# Patient Record
Sex: FEMALE | Race: WHITE | NOT HISPANIC OR LATINO
[De-identification: names, ages, dates, MRNs, and addresses within clinical notes are randomized per-mention and may not be internally consistent; named-entity substitution may affect disease eponyms.]

---

## 2018-08-21 ENCOUNTER — APPOINTMENT (OUTPATIENT)
Dept: FAMILY MEDICINE | Facility: CLINIC | Age: 28
End: 2018-08-21
Payer: COMMERCIAL

## 2018-08-21 VITALS
OXYGEN SATURATION: 100 % | HEIGHT: 63 IN | BODY MASS INDEX: 19.49 KG/M2 | RESPIRATION RATE: 12 BRPM | WEIGHT: 110 LBS | SYSTOLIC BLOOD PRESSURE: 115 MMHG | HEART RATE: 67 BPM | DIASTOLIC BLOOD PRESSURE: 77 MMHG

## 2018-08-21 DIAGNOSIS — Z78.9 OTHER SPECIFIED HEALTH STATUS: ICD-10-CM

## 2018-08-21 DIAGNOSIS — Z80.9 FAMILY HISTORY OF MALIGNANT NEOPLASM, UNSPECIFIED: ICD-10-CM

## 2018-08-21 PROCEDURE — 99385 PREV VISIT NEW AGE 18-39: CPT

## 2018-08-21 NOTE — REVIEW OF SYSTEMS
[Abdominal Pain] : abdominal pain [Negative] : Neurological [Nausea] : no nausea [Constipation] : no constipation [Diarrhea] : diarrhea [Vomiting] : no vomiting

## 2018-08-21 NOTE — PLAN
[FreeTextEntry1] : 1. Abdominal pain: given pt's HPI, likely IBS but cannot exclude other malabsorptive/gastric etiology. Will refer to a gastroenterologist for further workout. Discussed keeping a food journal of the meals that cause more pain prior to GI visit. \par 2. Dyspareunia: Pt reports never seeing a OB/GYN, will refer her for further work up and pap smear. Denies STI testing at this visit.\par 3. Lipoma: 1.5cm on L shoulder; discussed removal of mass is solely cosmetic but gave pt a dermatologist to see in case she wishes to pursue removal. \par 3. HCM: will request vaccine titers in order to complete employment physical exam form.

## 2018-08-21 NOTE — PHYSICAL EXAM

## 2018-08-21 NOTE — HISTORY OF PRESENT ILLNESS
[FreeTextEntry1] : 28y F presenting to [facility] care and c/o diffuse abdominal pain for years. Describes pain as crampy 8/10 when severe, 5/10 on average. Pain worsens after a meal, usually relieved with BM. States after every meal has a BM of  mixed normal/soft consistency, on average 1-2 BM/day. Reports BM can have her on the toilet for up to an hour.  States will eat less at work to avoid using the bathroom and if has the urge to defecate, will wait until she returns home in the evening. Describes stool as nonmalodorous,without floating, normal brown color. Denies family hx of malabsorption. Denies fever, n/v, weight loss, straining, hematochezia, melena, diarrhea, constipation.

## 2018-08-21 NOTE — PAST MEDICAL HISTORY
[Menstruating] : menstruating [Menarche Age ____] : age at menarche was [unfilled] [Definite ___ (Date)] : the last menstrual period was [unfilled]

## 2018-08-23 ENCOUNTER — LABORATORY RESULT (OUTPATIENT)
Age: 28
End: 2018-08-23

## 2018-08-28 LAB
HBV SURFACE AB SERPL IA-ACNC: 14.8 MIU/ML
MEV IGG FLD QL IA: <5 AU/ML
MEV IGG+IGM SER-IMP: NEGATIVE
MUV AB SER-ACNC: POSITIVE
MUV IGG SER QL IA: 17.6 AU/ML
RUBV IGG FLD-ACNC: 1.1 INDEX
RUBV IGG SER-IMP: POSITIVE
VZV AB TITR SER: POSITIVE
VZV IGG SER IF-ACNC: 1773 INDEX

## 2018-09-06 LAB
M TB IFN-G BLD-IMP: POSITIVE
QUANTIFERON TB PLUS MITOGEN MINUS NIL: >10 IU/ML
QUANTIFERON TB PLUS NIL: 0.01 IU/ML
QUANTIFERON TB PLUS TB1 MINUS NIL: 1.19 IU/ML
QUANTIFERON TB PLUS TB2 MINUS NIL: 1.26 IU/ML

## 2018-09-13 ENCOUNTER — APPOINTMENT (OUTPATIENT)
Dept: OBGYN | Facility: CLINIC | Age: 28
End: 2018-09-13

## 2018-09-19 ENCOUNTER — LABORATORY RESULT (OUTPATIENT)
Age: 28
End: 2018-09-19

## 2018-10-08 ENCOUNTER — TRANSCRIPTION ENCOUNTER (OUTPATIENT)
Age: 28
End: 2018-10-08

## 2018-11-12 ENCOUNTER — APPOINTMENT (OUTPATIENT)
Dept: FAMILY MEDICINE | Facility: CLINIC | Age: 28
End: 2018-11-12

## 2021-01-06 ENCOUNTER — TRANSCRIPTION ENCOUNTER (OUTPATIENT)
Age: 31
End: 2021-01-06

## 2021-01-26 ENCOUNTER — TRANSCRIPTION ENCOUNTER (OUTPATIENT)
Age: 31
End: 2021-01-26

## 2022-08-25 ENCOUNTER — EMERGENCY (EMERGENCY)
Facility: HOSPITAL | Age: 32
LOS: 1 days | Discharge: OTHER INSTITUTION | End: 2022-08-25
Payer: COMMERCIAL

## 2022-08-25 ENCOUNTER — INPATIENT (INPATIENT)
Facility: HOSPITAL | Age: 32
LOS: 4 days | Discharge: HOME | DRG: 287 | End: 2022-08-30
Attending: INTERNAL MEDICINE | Admitting: OBSTETRICS & GYNECOLOGY
Payer: COMMERCIAL

## 2022-08-25 VITALS
SYSTOLIC BLOOD PRESSURE: 119 MMHG | WEIGHT: 106.04 LBS | DIASTOLIC BLOOD PRESSURE: 76 MMHG | OXYGEN SATURATION: 100 % | RESPIRATION RATE: 18 BRPM | HEIGHT: 63 IN | TEMPERATURE: 98 F | HEART RATE: 100 BPM

## 2022-08-25 VITALS
TEMPERATURE: 100 F | OXYGEN SATURATION: 83 % | DIASTOLIC BLOOD PRESSURE: 64 MMHG | SYSTOLIC BLOOD PRESSURE: 118 MMHG | HEART RATE: 91 BPM

## 2022-08-25 VITALS
SYSTOLIC BLOOD PRESSURE: 117 MMHG | DIASTOLIC BLOOD PRESSURE: 87 MMHG | RESPIRATION RATE: 20 BRPM | HEART RATE: 100 BPM | OXYGEN SATURATION: 100 %

## 2022-08-25 DIAGNOSIS — O26.899 OTHER SPECIFIED PREGNANCY RELATED CONDITIONS, UNSPECIFIED TRIMESTER: ICD-10-CM

## 2022-08-25 DIAGNOSIS — Z3A.00 WEEKS OF GESTATION OF PREGNANCY NOT SPECIFIED: ICD-10-CM

## 2022-08-25 LAB
ALBUMIN SERPL ELPH-MCNC: 3.6 G/DL — SIGNIFICANT CHANGE UP (ref 3.3–5)
ALP SERPL-CCNC: 36 U/L — LOW (ref 40–120)
ALT FLD-CCNC: 16 U/L — SIGNIFICANT CHANGE UP (ref 10–45)
ANION GAP SERPL CALC-SCNC: 10 MMOL/L — SIGNIFICANT CHANGE UP (ref 5–17)
APPEARANCE UR: ABNORMAL
APTT BLD: 25.8 SEC — LOW (ref 27.5–35.5)
AST SERPL-CCNC: 21 U/L — SIGNIFICANT CHANGE UP (ref 10–40)
BACTERIA # UR AUTO: ABNORMAL
BASOPHILS # BLD AUTO: 0.02 K/UL — SIGNIFICANT CHANGE UP (ref 0–0.2)
BASOPHILS NFR BLD AUTO: 0.2 % — SIGNIFICANT CHANGE UP (ref 0–2)
BILIRUB SERPL-MCNC: 0.8 MG/DL — SIGNIFICANT CHANGE UP (ref 0.2–1.2)
BILIRUB UR-MCNC: NEGATIVE — SIGNIFICANT CHANGE UP
BLD GP AB SCN SERPL QL: NEGATIVE — SIGNIFICANT CHANGE UP
BUN SERPL-MCNC: 10 MG/DL — SIGNIFICANT CHANGE UP (ref 7–23)
CALCIUM SERPL-MCNC: 8.5 MG/DL — SIGNIFICANT CHANGE UP (ref 8.4–10.5)
CHLORIDE SERPL-SCNC: 104 MMOL/L — SIGNIFICANT CHANGE UP (ref 96–108)
CO2 SERPL-SCNC: 23 MMOL/L — SIGNIFICANT CHANGE UP (ref 22–31)
COLOR SPEC: SIGNIFICANT CHANGE UP
CREAT SERPL-MCNC: 0.56 MG/DL — SIGNIFICANT CHANGE UP (ref 0.5–1.3)
DIFF PNL FLD: ABNORMAL
EGFR: 126 ML/MIN/1.73M2 — SIGNIFICANT CHANGE UP
EOSINOPHIL # BLD AUTO: 0.03 K/UL — SIGNIFICANT CHANGE UP (ref 0–0.5)
EOSINOPHIL NFR BLD AUTO: 0.3 % — SIGNIFICANT CHANGE UP (ref 0–6)
EPI CELLS # UR: 4 /HPF — SIGNIFICANT CHANGE UP
FIBRINOGEN PPP-MCNC: 558 MG/DL — HIGH (ref 330–520)
GLUCOSE SERPL-MCNC: 89 MG/DL — SIGNIFICANT CHANGE UP (ref 70–99)
GLUCOSE UR QL: NEGATIVE — SIGNIFICANT CHANGE UP
HCT VFR BLD CALC: 33.4 % — LOW (ref 34.5–45)
HGB BLD-MCNC: 11.4 G/DL — LOW (ref 11.5–15.5)
IMM GRANULOCYTES NFR BLD AUTO: 0.4 % — SIGNIFICANT CHANGE UP (ref 0–1.5)
INR BLD: 0.92 RATIO — SIGNIFICANT CHANGE UP (ref 0.88–1.16)
KETONES UR-MCNC: SIGNIFICANT CHANGE UP
LEUKOCYTE ESTERASE UR-ACNC: ABNORMAL
LYMPHOCYTES # BLD AUTO: 0.95 K/UL — LOW (ref 1–3.3)
LYMPHOCYTES # BLD AUTO: 10 % — LOW (ref 13–44)
MCHC RBC-ENTMCNC: 30.8 PG — SIGNIFICANT CHANGE UP (ref 27–34)
MCHC RBC-ENTMCNC: 34.1 GM/DL — SIGNIFICANT CHANGE UP (ref 32–36)
MCV RBC AUTO: 90.3 FL — SIGNIFICANT CHANGE UP (ref 80–100)
MONOCYTES # BLD AUTO: 0.72 K/UL — SIGNIFICANT CHANGE UP (ref 0–0.9)
MONOCYTES NFR BLD AUTO: 7.6 % — SIGNIFICANT CHANGE UP (ref 2–14)
NEUTROPHILS # BLD AUTO: 7.77 K/UL — HIGH (ref 1.8–7.4)
NEUTROPHILS NFR BLD AUTO: 81.5 % — HIGH (ref 43–77)
NITRITE UR-MCNC: NEGATIVE — SIGNIFICANT CHANGE UP
NRBC # BLD: 0 /100 WBCS — SIGNIFICANT CHANGE UP (ref 0–0)
PH UR: 6.5 — SIGNIFICANT CHANGE UP (ref 5–8)
PLATELET # BLD AUTO: 364 K/UL — SIGNIFICANT CHANGE UP (ref 150–400)
POTASSIUM SERPL-MCNC: 3.8 MMOL/L — SIGNIFICANT CHANGE UP (ref 3.5–5.3)
POTASSIUM SERPL-SCNC: 3.8 MMOL/L — SIGNIFICANT CHANGE UP (ref 3.5–5.3)
PROT SERPL-MCNC: 6.1 G/DL — SIGNIFICANT CHANGE UP (ref 6–8.3)
PROT UR-MCNC: SIGNIFICANT CHANGE UP
PROTHROM AB SERPL-ACNC: 10.7 SEC — SIGNIFICANT CHANGE UP (ref 10.5–13.4)
RBC # BLD: 3.7 M/UL — LOW (ref 3.8–5.2)
RBC # FLD: 13.4 % — SIGNIFICANT CHANGE UP (ref 10.3–14.5)
RBC CASTS # UR COMP ASSIST: SIGNIFICANT CHANGE UP /HPF (ref 0–4)
RH IG SCN BLD-IMP: NEGATIVE — SIGNIFICANT CHANGE UP
SARS-COV-2 RNA SPEC QL NAA+PROBE: SIGNIFICANT CHANGE UP
SODIUM SERPL-SCNC: 137 MMOL/L — SIGNIFICANT CHANGE UP (ref 135–145)
SP GR SPEC: 1.01 — SIGNIFICANT CHANGE UP (ref 1.01–1.02)
TROPONIN T, HIGH SENSITIVITY RESULT: 7 NG/L — SIGNIFICANT CHANGE UP (ref 0–51)
UROBILINOGEN FLD QL: NEGATIVE — SIGNIFICANT CHANGE UP
WBC # BLD: 9.53 K/UL — SIGNIFICANT CHANGE UP (ref 3.8–10.5)
WBC # FLD AUTO: 9.53 K/UL — SIGNIFICANT CHANGE UP (ref 3.8–10.5)
WBC UR QL: 7 /HPF — HIGH (ref 0–5)

## 2022-08-25 PROCEDURE — 86900 BLOOD TYPING SEROLOGIC ABO: CPT

## 2022-08-25 PROCEDURE — 99285 EMERGENCY DEPT VISIT HI MDM: CPT | Mod: 25

## 2022-08-25 PROCEDURE — 81001 URINALYSIS AUTO W/SCOPE: CPT

## 2022-08-25 PROCEDURE — 84443 ASSAY THYROID STIM HORMONE: CPT

## 2022-08-25 PROCEDURE — 85610 PROTHROMBIN TIME: CPT

## 2022-08-25 PROCEDURE — 83880 ASSAY OF NATRIURETIC PEPTIDE: CPT

## 2022-08-25 PROCEDURE — 86901 BLOOD TYPING SEROLOGIC RH(D): CPT

## 2022-08-25 PROCEDURE — U0003: CPT

## 2022-08-25 PROCEDURE — 87086 URINE CULTURE/COLONY COUNT: CPT

## 2022-08-25 PROCEDURE — 99284 EMERGENCY DEPT VISIT MOD MDM: CPT | Mod: 25

## 2022-08-25 PROCEDURE — 85379 FIBRIN DEGRADATION QUANT: CPT

## 2022-08-25 PROCEDURE — 36415 COLL VENOUS BLD VENIPUNCTURE: CPT

## 2022-08-25 PROCEDURE — U0005: CPT

## 2022-08-25 PROCEDURE — 84484 ASSAY OF TROPONIN QUANT: CPT

## 2022-08-25 PROCEDURE — 84436 ASSAY OF TOTAL THYROXINE: CPT

## 2022-08-25 PROCEDURE — 86850 RBC ANTIBODY SCREEN: CPT

## 2022-08-25 PROCEDURE — 99223 1ST HOSP IP/OBS HIGH 75: CPT

## 2022-08-25 PROCEDURE — 80053 COMPREHEN METABOLIC PANEL: CPT

## 2022-08-25 PROCEDURE — 85025 COMPLETE CBC W/AUTO DIFF WBC: CPT

## 2022-08-25 PROCEDURE — 85730 THROMBOPLASTIN TIME PARTIAL: CPT

## 2022-08-25 PROCEDURE — 93010 ELECTROCARDIOGRAM REPORT: CPT

## 2022-08-25 RX ORDER — ACETAMINOPHEN 500 MG
650 TABLET ORAL ONCE
Refills: 0 | Status: COMPLETED | OUTPATIENT
Start: 2022-08-25 | End: 2022-08-25

## 2022-08-25 RX ORDER — SODIUM CHLORIDE 9 MG/ML
1000 INJECTION, SOLUTION INTRAVENOUS
Refills: 0 | Status: COMPLETED | OUTPATIENT
Start: 2022-08-25 | End: 2022-08-26

## 2022-08-25 RX ORDER — SODIUM CHLORIDE 9 MG/ML
1000 INJECTION INTRAMUSCULAR; INTRAVENOUS; SUBCUTANEOUS ONCE
Refills: 0 | Status: COMPLETED | OUTPATIENT
Start: 2022-08-25 | End: 2022-08-25

## 2022-08-25 RX ORDER — MORPHINE SULFATE 50 MG/1
4 CAPSULE, EXTENDED RELEASE ORAL ONCE
Refills: 0 | Status: COMPLETED | OUTPATIENT
Start: 2022-08-25 | End: 2022-08-25

## 2022-08-25 RX ADMIN — Medication 650 MILLIGRAM(S): at 17:39

## 2022-08-25 RX ADMIN — SODIUM CHLORIDE 1000 MILLILITER(S): 9 INJECTION INTRAMUSCULAR; INTRAVENOUS; SUBCUTANEOUS at 17:38

## 2022-08-25 RX ADMIN — SODIUM CHLORIDE 1000 MILLILITER(S): 9 INJECTION INTRAMUSCULAR; INTRAVENOUS; SUBCUTANEOUS at 19:26

## 2022-08-25 RX ADMIN — Medication 650 MILLIGRAM(S): at 19:57

## 2022-08-25 NOTE — ED PROVIDER NOTE - CLINICAL SUMMARY MEDICAL DECISION MAKING FREE TEXT BOX
Impression:  H&P most c/w vasovagal syncope, given antecedent light-headedness, diaphoresis, and then had an LOC.  Fall down the stars is notable for lack of significant external trauma at this time.    By CCHR/CCCR, the patient does not necessitate CT at this time.  Bedside US shows +fetal movement, and FHR ~ 130s  Plan:  Basic labs, NST, TSH, T4, UA, ECG, reassess. IVF. ECG:  LBBB no comparison study  Impression:  Syncope in context of new LBBB on ECG; will need admit for cardiac workup and likely TTE.  By CCHR/CCCR, the patient does not necessitate CT at this time.  Bedside US shows +fetal movement; FHRs 130s; patient was told that her OB told her that the baby needed a specific "cardiac echo."   Plan:  Basic labs, NST, TSH, T4, UA, ECG, reassess. IVF.  Anticipate admit, either to OB with Cards consult, or Medicine with Cards and OB consult.

## 2022-08-25 NOTE — ED PROVIDER NOTE - PHYSICAL EXAMINATION
Gen: NAD, AOx3, able to make needs known, non-toxic  HEENT: EOMI, oral mucosa moist, normal conjunctiva. No head trauma visualized, no cervical spine tenderness.   CV: pulses bilaterally, no sternal tenderness, no clavicle tenderness   Abd: gravid, tender in the bilateral lower regions, fundus 4.5cm above the umbilicus   MSK: no visible bony deformities, no spinal tenderness, no tenderness with palpation of the bilateral UE and LE, no hip or pelvis tenderness.  Neuro: No focal sensory or motor deficits   Skin: Warm, well perfused, no rash   Psych: normal affect

## 2022-08-25 NOTE — CONSULT NOTE ADULT - ASSESSMENT
The patient is a 32 year old , currently at 20 weeks of gestation presenting to the ED after a reported syncopal episode. EKG found to have LBBB. Recommendations at this time are as follows:       Syncope/Pre-syncope  - Event likely vasovagal in nature/secondary to dehydration given prodrome   - Telemetry monitoring   - orthostatic vitals if possible  - Adequate hydration      LBBB   - EKG with LBBB, unclear whether this is new as patient reports no prior EKGs for comparison   - QTc prolonged, avoid further QT prolonging agents   - Chest pain not concerning for angina, patient describes the pain as a burning associated with reflux, most significant after meals, not associated with activity/walking etc.   - Initial Troponin 12, repeat pending   -Echo in AM to evaluate for any ventricular dysfunction       Lilliana Stahl MD   Cardiology Fellow     This consult note is preliminary until cosigned by the attending cardiologist. The patient is a 32 year old , currently at 20 weeks of gestation presenting to the ED after a reported syncopal episode. EKG found to have LBBB. 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   - Initial Troponin 12, repeat pending   -Echo in AM to evaluate for any ventricular dysfunction       Lilliana Sthal MD   Cardiology Fellow     This consult note is preliminary until cosigned by the attending cardiologist. The patient is a 32 year old , currently at 20 weeks of gestation presenting to the ED after a reported syncopal episode. EKG found to have LBBB. Chest pain mild at this time and patient describes this as associated with reflux and similar to her GERD discomfort. 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   - Initial Troponin 12, repeat pending   - Echo in AM to evaluate for any ventricular dysfunction       Lilliana Stahl MD   Cardiology Fellow     This consult note is preliminary until cosigned by the attending cardiologist.

## 2022-08-25 NOTE — OB RN TRIAGE NOTE - PRO MENTAL HEALTH SX RECENT
none
SISI SANDERS  Cardiology  [address] [place] [address].  [place] [address]  Phone: [phone]  Fax: [phone]  Follow Up Time:

## 2022-08-25 NOTE — ED PROVIDER NOTE - NS ED ROS FT
GENERAL: No fever, no chills  EYES: No change in vision  HEENT: No trouble swallowing or speaking  CARDIAC: No chest pain  PULMONARY: No cough, no SOB  GI: +abdominal pain, no nausea, no vomiting, no diarrhea, no constipation  : No changes in urination  SKIN: No rashes  NEURO: No headache, no numbness  MSK: No joint pain  Otherwise as HPI or negative.

## 2022-08-25 NOTE — H&P ADULT - HISTORY OF PRESENT ILLNESS
TAMMIE CRANE  32y  Female [id number]    INCOMPLETE    HPI: 32 year old  @ 20+1 weeks (NERISSA: 23) presents to the ED s/p a fall at 3pm. Patient states that at this time she was standing on the top of the stairs when she suddenly began to feel lower abdominal pain and dizzy. Because she thought she was about to synopsize, the patient sat down. Per the patient, the next thing she remembers is waking up at the bottom of the stairs sitting on her bottom. Patient thinks that she slid down the stairs on her bottom; however, she is unsure, as she was not conscious when she went down the stairs. Patient states that since the fall she has been feeling constant, lower back pain that she rates as an 8/10. She received Tylenol in the ED for the pain; however, states that it only marginally helped. Patient states that the back pain radiates to her lower abdomen. States that the pain in her lower abdomen comes and goes and she rates this pain as a 5/10. Of note, the syncopal episode was unwitnessed; however, she denies any urinary or bowel incontinence. Denies any tongue biting. Also states that when she regained consciousness she did not feel any increase in tiredness.         Name of GYN Physician: Sarah    POB:      Pgyn: Denies fibroids, cysts, endometriosis, STI's, Abnormal pap smears     Home meds:     Hospital Meds:   MEDICATIONS  (STANDING):  morphine  - Injectable 4 milliGRAM(s) IV Push Once    MEDICATIONS  (PRN):      Allergies    No Known Allergies    Intolerances        PAST MEDICAL & SURGICAL HISTORY:  No pertinent past medical history      No significant past surgical history          FAMILY HISTORY:      Social History:  Denies smoking use, drug use, alcohol use.   +occasional social alcohol use    Vital Signs Last 24 Hrs  T(C): 36.8 (25 Aug 2022 16:36), Max: 36.8 (25 Aug 2022 16:36)  T(F): 98.3 (25 Aug 2022 16:36), Max: 98.3 (25 Aug 2022 16:36)  HR: 100 (25 Aug 2022 18:30) (100 - 100)  BP: 117/87 (25 Aug 2022 18:30) (117/87 - 119/76)  BP(mean): --  RR: 20 (25 Aug 2022 18:30) (18 - 20)  SpO2: 100% (25 Aug 2022 18:30) (100% - 100%)    Parameters below as of 25 Aug 2022 18:30  Patient On (Oxygen Delivery Method): room air        Physical Exam:   General: sitting comfortably in bed, NAD   CV: RR S1S2 no m/r/g  Lungs: CTA b/l, good air flow b/l   Back: No CVA tenderness  Abd: Soft, non-tender, non-distended.  Bowel sounds present.    :  No bleeding on pad.    External labia wnl.  Bimanual exam with no cervical motion tenderness, uterus wnl, adnexa non palpable b/l.  Cervix closed vs. Cervix dilated    cm   Speculum Exam: No active bleeding from os.  Physiologic discharge.    Ext: non-tender b/l, no edema     LABS:                              11.4   9.53  )-----------( 364      ( 25 Aug 2022 18:02 )             33.4         137  |  104  |  10  ----------------------------<  89  3.8   |  23  |  0.56    Ca    8.5      25 Aug 2022 18:04    TPro  6.1  /  Alb  3.6  /  TBili  0.8  /  DBili  x   /  AST  21  /  ALT  16  /  AlkPhos  36<L>      I&O's Detail    PT/INR - ( 25 Aug 2022 18:03 )   PT: 10.7 sec;   INR: 0.92 ratio         PTT - ( 25 Aug 2022 18:03 )  PTT:25.8 sec      RADIOLOGY & ADDITIONAL STUDIES: TAMMIE CRANE  32y  Female [id number]    INCOMPLETE    HPI: 32 year old  @ 20+1 weeks (NERISSA: 23) presents to the ED s/p a fall at 3pm. Patient states that at this time she was standing on the top of the stairs when she suddenly began to feel lower abdominal pain and dizzy. Because she thought she was about to synopsize, the patient sat down. Per the patient, the next thing she remembers is waking up at the bottom of the stairs sitting on her bottom. Patient thinks that she slid down the stairs on her bottom; however, she is unsure, as she was not conscious when she went down the stairs. Patient states that since the fall she has been feeling constant, lower back pain that she rates as an 8/10. She received Tylenol in the ED for the pain; however, states that it only marginally helped. Patient states that the back pain radiates to her lower abdomen. States that the pain in her lower abdomen comes and goes and she rates this pain as a 5/10. Of note, the syncopal episode was unwitnessed; however, she denies any urinary or bowel incontinence. Denies any tongue biting. Also states that when she regained consciousness she did not feel any increase in tiredness. Of note patient states that she had these presyncopal feelings multiple times in the past year that have intensified during pregnancy. Of note patient denies ever losing consciousness in the past. Patient states that these feelings are normally precipitated by warm temperatures or standing for prolonged periods of time. Patient denies any family history of anyone suffering from episodes akin to this. At time of OBGYN evaluation in the ED, patient still endorsed lightheadedness and dizziness and stated that she did not think she would be able to get out of bed without a syncopal event. Patient endorsed intermittent palpitations. Denies nausea, vomiting, chest pain at time of OBGYN initial evaluation.      Name of GYN Physician: Sarah    POB: Primagravid  Gyn: Denies cysts, fibroids, abnormal paps, STIs  PMHx: Denies  Meds: None  All: NKDA  Surgeries: Denies  Social: Denies toxic habits x3         TAMMIE CRANE  32y  Female [id number]    INCOMPLETE    HPI: 32 year old  @ 20+1 weeks (NERISSA: 23) presents to the ED s/p a fall at 3pm. Patient states that at this time she was standing on the top of the stairs when she suddenly began to feel lower abdominal pain and dizzy. Because she thought she was about to synopsize, the patient sat down. Per the patient, the next thing she remembers is waking up at the bottom of the stairs sitting on her bottom. Patient thinks that she slid down the stairs on her bottom; however, she is unsure, as she was not conscious when she went down the stairs. Patient states that since the fall she has been feeling constant, lower back pain that she rates as an 8/10. She received Tylenol in the ED for the pain; however, states that it only marginally helped. Patient states that the back pain radiates to her lower abdomen. States that the pain in her lower abdomen comes and goes and she rates this pain as a 5/10. Of note, the syncopal episode was unwitnessed; however, she denies any urinary or bowel incontinence. Denies any tongue biting. Also states that when she regained consciousness she did not feel any increase in tiredness. Of note patient states that she had these presyncopal feelings multiple times in the past year that have intensified during pregnancy. Of note patient denies ever losing consciousness in the past. Patient states that these feelings are normally precipitated by warm temperatures or standing for prolonged periods of time. Patient denies any family history of anyone suffering from episodes akin to this. At time of OBGYN evaluation in the ED, patient still endorsed lightheadedness and dizziness and stated that she did not think she would be able to get out of bed without a syncopal event. Patient endorsed intermittent palpitations. Denies nausea, vomiting, chest pain at time of OBGYN initial evaluation.    PNC: Complicated by PACs seen on fetal growth scan, patient recommended to follow for fetal echo    Name of GYN Physician: Sarah    POB: Primagravid  Gyn: Denies cysts, fibroids, abnormal paps, STIs  PMHx: Denies  Meds: None  All: NKDA  Surgeries: Denies  Social: Denies toxic habits x3

## 2022-08-25 NOTE — OB RN TRIAGE NOTE - SUICIDE SCREENING QUESTION 2
No

## 2022-08-25 NOTE — ED PROVIDER NOTE - ATTENDING CONTRIBUTION TO CARE
MD Hawkins:  patient seen and evaluated with the resident.  I was present for key portions of the History and Physical, and I agree with the Impression and Plan.    Patient is a 31 yo F,  @ 5.5 months, bib EMS s/p fall down stairs at home.   Onset: felt light-headedness while at the top of stairs at home, sat down bc she didn't want to fall, next thing she knew, she woke up on the bottom of her stairs.  Denies CP/SOB.   Onset: gradually with antecedent light-headedness and tunnel-vision.    Quality:  full LOC.  Duration< 60 sec.  Associated Sx:  No CP/SOB, no palpitations, back pain.  No weakness/numbness, no abdominal pain, & no fever/chills.  +fetal movement, no bleeding, no loss of fluid.    VS: wnl.  Physical Exam: adult F, NAD, NCAT, PERRL, EOMI, neck supple, CTA B, RRR,   Abd: s/nd/nt, Ext: no edema.    Spine:  no midline TTP, no step-off.  Neuro:  AAOx3, moving all 4 extremities equally, normal gait.     ECG:    Impression:  H&P most c/w vasovagal syncope, given antecedent light-headedness, diaphoresis, and then had an LOC.  Fall down the stars is notable for lack of significant external trauma at this time.    By CCHR/CCCR, the patient does not necessitate CT at this time.  Bedside US shows +fetal movement; FHRs 130s; patient was told that her OB told her that the baby needed a specific "cardiac echo."   Plan:  Basic labs, NST, TSH, T4, UA, ECG, reassess. IVF. MD Hawkins:  patient seen and evaluated with the resident.  I was present for key portions of the History and Physical, and I agree with the Impression and Plan.    Patient is a 33 yo F,  @ 5.5 months, bib EMS s/p fall down stairs at home.   Onset: felt light-headedness while at the top of stairs at home, sat down bc she didn't want to fall, next thing she knew, she woke up on the bottom of her stairs.  Denies CP/SOB.   Onset: gradually with antecedent light-headedness and tunnel-vision.    Quality:  full LOC.  Duration< 60 sec.  Associated Sx:  No CP/SOB, no palpitations, back pain.  No weakness/numbness, no abdominal pain, & no fever/chills.  +fetal movement, no bleeding, no loss of fluid.    VS: wnl.  Physical Exam: adult F, NAD, NCAT, PERRL, EOMI, neck supple, CTA B, RRR,   Abd: s/nd/nt, Ext: no edema.    Spine:  no midline TTP, no step-off.  Neuro:  AAOx3, moving all 4 extremities equally, normal gait.     ECG:  LBBB no comparison study  Impression:  Syncope in context of new LBBB on ECG; will need admit for cardiac workup and likely TTE.  By CCHR/CCCR, the patient does not necessitate CT at this time.  Bedside US shows +fetal movement; FHRs 130s; patient was told that her OB told her that the baby needed a specific "cardiac echo."   Plan:  Basic labs, NST, TSH, T4, UA, ECG, reassess. IVF.  Anticipate admit, either to OB with Cards consult, or Medicine with Cards and OB consult.

## 2022-08-25 NOTE — ED ADULT NURSE NOTE - NSIMPLEMENTINTERV_GEN_ALL_ED
Implemented All Fall Risk Interventions:  [place] to call system. Call bell, personal items and telephone within reach. Instruct patient to call for assistance. Room bathroom lighting operational. Non-slip footwear when patient is off stretcher. Physically safe environment: no spills, clutter or unnecessary equipment. Stretcher in lowest position, wheels locked, appropriate side rails in place. Provide visual cue, wrist band, yellow gown, etc. Monitor gait and stability. Monitor for mental status changes and reorient to person, place, and time. Review medications for side effects contributing to fall risk. Reinforce activity limits and safety measures with patient and family.

## 2022-08-25 NOTE — ED PROVIDER NOTE - PROGRESS NOTE DETAILS
Javed, PGY2 - ObGyn aware, will come see patient. Cardiology was paged previously but ER told to call back Javed, PGY2 - Cardiology Favio consulted for new onset LBBB, will come see patient MD Hawkins:  ddimer < 500.  Per YEARS algorithm, patient does not need CTA at this time. Layo PGY2   Patient signed out to me pending OB rec.   Discussed with OB - they would like the patient to go back to L&D first to be evaluated and then they will admit the patient to their service.

## 2022-08-25 NOTE — ED PROVIDER NOTE - OBJECTIVE STATEMENT
31yo woman , 5 months and 1 week pregnant, with no PMH presenting after syncopal episode complaining of lower abdominal and back pain. Patient was at the top of carpeted stairs, felt lightheaded, sat down, next thing she knows she is at the bottom of the stairs. Denies vaginal bleeding or fluid. Initially had SOB but that has resolved at this time.

## 2022-08-25 NOTE — H&P ADULT - ATTENDING COMMENTS
P0 20w1d with left bundle branch block and possible syncopal episode.  Pt admitted for cardiology work up and monitoring. On tele per Cards. for TTE in AM.   Prior pain now resolved. no ctx on toco. okay to d/c toco.   BPP 8/8, MVP 3.8. nl FHR. nl appearing closed cervix.   Baby with PAC on 20 wk sono, plan for fetal echo. to find out if able to complete in house.   Very low suspicion for PTL or other acute OB pathology.   Holding on [facility] consult for now. Will await further cardiology recs.   Sue WILBURN

## 2022-08-25 NOTE — ED ADULT NURSE NOTE - OBJECTIVE STATEMENT
Pt is a 33 y/o female  s/p syncope causing fall. States she has had some dizziness over past few weeks, woke up today c/o SOB and dizziness which worsened when pt states she synopsized when attempting to go down stairs. States she does not remember and was unwitnessed but was at bottom of stairs when she gained consciousness. Pt c/o L shoulder pain and generalized lower back pain. Denies headache or abdominal pain. Denies N/V/D, numbness, tingling, cough, fever, chills, weakness, headache. A&Ox3. Breathing unlabored and spontaneous. Abdomen soft, nontender. Full ROM and strength of extremities. Safety and comfort measures provided, call bell provided to pt and bed in lowest position.

## 2022-08-25 NOTE — H&P ADULT - NSHPPHYSICALEXAM_GEN_ALL_CORE
Vital Signs Last 24 Hrs  T(C): 36.8 (25 Aug 2022 16:36), Max: 36.8 (25 Aug 2022 16:36)  T(F): 98.3 (25 Aug 2022 16:36), Max: 98.3 (25 Aug 2022 16:36)  HR: 100 (25 Aug 2022 18:30) (100 - 100)  BP: 117/87 (25 Aug 2022 18:30) (117/87 - 119/76)  BP(mean): --  RR: 20 (25 Aug 2022 18:30) (18 - 20)  SpO2: 100% (25 Aug 2022 18:30) (100% - 100%)    Parameters below as of 25 Aug 2022 18:30  Patient On (Oxygen Delivery Method): room air      Physical Exam:   General: sitting comfortably in bed, NAD   CV: RR S1S2 no m/r/g  Lungs: CTA b/l, good air flow b/l   Abd: Soft, mildly tender to palpation, non-distended.  Bowel sounds present.    :  No bleeding on pad.  External labia wnl.  Gentle bimanual exam with no cervical motion tenderness, uterus wnl, adnexa non palpable b/l.  Cervix closed   Speculum Exam: No active bleeding from os. Physiologic discharge.    Ext: non-tender b/l, no edema

## 2022-08-25 NOTE — CONSULT NOTE ADULT - ATTENDING COMMENTS
Events that led to the patient's hospitalization reviewed.  The patient's past medical history/surgical /family history/social history was gone over.  Agree with physical examination as noted above.    The patient presents with 3 different types of chest discomfort.  She is having her typical GERD chest symptoms.  Also has developed midsternal chest discomfort that is occurring unprovoked in nature that is radiating to her left shoulder and associated with shortness of breath.  This chest discomfort lasted for a few seconds and resolved without any intervention.  It is not provoked in nature.  Intermittently when she takes it deep breath also now notes right and left chest pressure.  Denies any recent travel/falls or changes in medications.  The patient and her  do report that she does not drink enough fluid which may contribute to her presyncope/syncope.  Also has been experiencing left lower leg discomfort that is more prominent during the nighttime which feels like a cramp.    Patient was found to have a left bundle branch block.  It is unclear whether this is new.  No prior EKGs are available for comparison.  QTC is prolonged.  Patient did get nitroglycerin which improved her chest discomfort that was radiating to her left shoulder.    It is unclear what is the etiology of the patient's chest pain discomfort.  Would recommend that a stat TTE be performed along with a venous duplex study.  Continue to trend cardiac enzymes.    D-dimer elevated and was reviewed with cardio OB team.  It is not felt to be of significant in this pregnant woman.    Concern that the patient may be experiencing SCAD.  Discussed different ways for scad to be diagnosed including CTA and coronary angiography.  The pros/cons and the risk/benefits of the various treatment options were gone over.  Indications and details for cardiac catheterization were reviewed.  Benefits and risks were explained.  Risks include but are not limited to infection, bleeding, arrhythmia, TIA/stroke, hemodynamic instability, vascular injury, need for urgent surgery and death.    Findings and plan were discussed with cardiology OB, MFM, cardiology nursing team, CICU/Dr. Woods and interventional cardiology.    All questions and concerns of the patient and her  were addressed.

## 2022-08-25 NOTE — OB RN TRIAGE NOTE - FALL HARM RISK - UNIVERSAL INTERVENTIONS
Bed in lowest position, wheels locked, appropriate side rails in place/Call bell, personal items and telephone in reach/Instruct patient to call for assistance before getting out of bed or chair/Non-slip footwear when patient is out of bed/[place] to call system/Physically safe environment - no spills, clutter or unnecessary equipment/Purposeful Proactive Rounding/Room/bathroom lighting operational, light cord in reach

## 2022-08-25 NOTE — H&P ADULT - NSHPLABSRESULTS_GEN_ALL_CORE
LABS:                              11.4   9.53  )-----------( 364      ( 25 Aug 2022 18:02 )             33.4     08-25    137  |  104  |  10  ----------------------------<  89  3.8   |  23  |  0.56    Ca    8.5      25 Aug 2022 18:04    TPro  6.1  /  Alb  3.6  /  TBili  0.8  /  DBili  x   /  AST  21  /  ALT  16  /  AlkPhos  36<L>  08-25    I&O's Detail    PT/INR - ( 25 Aug 2022 18:03 )   PT: 10.7 sec;   INR: 0.92 ratio         PTT - ( 25 Aug 2022 18:03 )  PTT:25.8 sec      RADIOLOGY & ADDITIONAL STUDIES:  FHR in ED: 130

## 2022-08-25 NOTE — CONSULT NOTE ADULT - SUBJECTIVE AND OBJECTIVE BOX
CARDIOLOGY FELLOW CONSULT NOTE    Consulting service:  Reason for consult:    HPI:  TAMMIE CRANE  32y  Female [phone]    INCOMPLETE    HPI: The patient is a 32 year old , currently at 20 weeks of gestation presenting to the ED after a reported syncopal episode. The patient reports having intermittent episodes of light-headedness and today at around 3pm, she was getting ready to go to the park when she began having a similar sensation accompanied by some chest discomfort and abdominal pain, as well as dizziness and nausea. She sat at the top of the stairs and states that the next thing she remembers is waking up on the bottom most step (13-14 stairs below). She believes she somehow slid to the bottom step, but denies tumbling/falling. Episode was unwitnessed. She has a mild headache but does not feel that she hit her head. She denies any urinary or bowel incontinence, or tongue biting. At the time of the evaluation, the pt continues to report some chest discomfort but states she has a history of GERD and feels some burning and tightness every time she has reflux, which has worsened during pregnancy. The pain is most significant after meals, not associated with activity. The patient does report some back pain as well. She has not had any prior EKGs. The patient's  admits that the patient does not hydrate well.       Name of GYN Physician: Sarah    POB:      Pgyn: Denies fibroids, cysts, endometriosis, STI's, Abnormal pap smears     Home meds:     Hospital Meds:   MEDICATIONS  (STANDING):  morphine  - Injectable 4 milliGRAM(s) IV Push Once    MEDICATIONS  (PRN):      Allergies    No Known Allergies    Intolerances        PAST MEDICAL & SURGICAL HISTORY:  No pertinent past medical history      No significant past surgical history          FAMILY HISTORY: No history of sudden cardiac death, arrhythmias, but states mother had an MI two weeks ago.       Social History:  Denies smoking use, drug use, alcohol use.   +occasional social alcohol use    Vital Signs Last 24 Hrs  T(C): 36.8 (25 Aug 2022 16:36), Max: 36.8 (25 Aug 2022 16:36)  T(F): 98.3 (25 Aug 2022 16:36), Max: 98.3 (25 Aug 2022 16:36)  HR: 100 (25 Aug 2022 18:30) (100 - 100)  BP: 117/87 (25 Aug 2022 18:30) (117/87 - 119/76)  BP(mean): --  RR: 20 (25 Aug 2022 18:30) (18 - 20)  SpO2: 100% (25 Aug 2022 18:30) (100% - 100%)    Parameters below as of 25 Aug 2022 18:30  Patient On (Oxygen Delivery Method): room air        Physical Exam:   General: NAD   CV: RR S1S2 no m/r/g  Lungs: CTA b/l  Abd: Soft, non-tender  Neurologic No facial asymmetry, normal speech  Ext: non-tender b/l, no edema     LABS:                              11.4   9.53  )-----------( 364      ( 25 Aug 2022 18:02 )             33.4         137  |  104  |  10  ----------------------------<  89  3.8   |  23  |  0.56    Ca    8.5      25 Aug 2022 18:04    TPro  6.1  /  Alb  3.6  /  TBili  0.8  /  DBili  x   /  AST  21  /  ALT  16  /  AlkPhos  36<L>      I&O's Detail    PT/INR - ( 25 Aug 2022 18:03 )   PT: 10.7 sec;   INR: 0.92 ratio         PTT - ( 25 Aug 2022 18:03 )  PTT:25.8 sec      RADIOLOGY & ADDITIONAL STUDIES: (25 Aug 2022 20:08)    PTT - ( 25 Aug 2022 18:03 )  PTT:25.8 sec    CARDIAC MARKERS ( 25 Aug 2022 18:04 )  12 ng/L / x     / x     / x     / x     / x            Serum Pro-Brain Natriuretic Peptide: 78 pg/mL ( @ 18:04)

## 2022-08-25 NOTE — H&P ADULT - ASSESSMENT
#LBBB  -consult cardiology  [facility]    #lower [facility] pain, r/o PTL  -NPO until toco  -L+D for toco  -BPP  -MFM to see patient in the AM  -LR@75  -Rho nico  -Cervical length      #maternal well being    32 year old  @ 20+1 weeks (NERISSA: 23) presents to the ED s/p a fall at 3pm. Patient began to feel abdominal pain and lightheaded. She subsequently lost conscious and regained consciousness after falling down 14 carpeted steps. Patient thinks she slid down all 14 steps on her behind and is not sure if she hit her abdomen. After the fall the patient complained of constant lower back pain as well intermittent lower pelvic pain. In the ED vital signs were stable. On physical exam, the os appeared closed on speculum and digital exam. No bleeding was noted from the OS. EKG in the ED showed a left bundle branch block. Patient to be admitted for further workup of the LBBB as well as rule out  labor    # labor  -patient to be admitted to antepartum to r/o  labor  -cervical os closed on digital and speculum exam  -patient to come to L and D for further monitoring on the toco to assess for contractions  -BPP to be completed upon arriving on L and D  -maintenance fluids    #LBBB  -consult cardiology, appreciate cardiology recs  -echocardiogram in the AM    #maternal well being  -Rho nico administration  -NPO until patient gets toco, if toco is unremarkable, regular diet    Discussed w Dr. Sarah Birch PGY2

## 2022-08-26 ENCOUNTER — ASOB RESULT (OUTPATIENT)
Age: 32
End: 2022-08-26

## 2022-08-26 ENCOUNTER — TRANSCRIPTION ENCOUNTER (OUTPATIENT)
Age: 32
End: 2022-08-26

## 2022-08-26 ENCOUNTER — APPOINTMENT (OUTPATIENT)
Dept: ANTEPARTUM | Facility: CLINIC | Age: 32
End: 2022-08-26

## 2022-08-26 DIAGNOSIS — R07.9 CHEST PAIN, UNSPECIFIED: ICD-10-CM

## 2022-08-26 LAB
COVID-19 SPIKE DOMAIN AB INTERP: POSITIVE
COVID-19 SPIKE DOMAIN ANTIBODY RESULT: >250 U/ML — HIGH
KLEIHAUER-BETKE CALCULATION: 0 % — SIGNIFICANT CHANGE UP (ref 0–0.3)
SARS-COV-2 IGG+IGM SERPL QL IA: >250 U/ML — HIGH
SARS-COV-2 IGG+IGM SERPL QL IA: POSITIVE
T4 AB SER-ACNC: 10.2 UG/DL — SIGNIFICANT CHANGE UP (ref 4.6–12)
TROPONIN T, HIGH SENSITIVITY RESULT: <6 NG/L — SIGNIFICANT CHANGE UP (ref 0–51)
TROPONIN T, HIGH SENSITIVITY RESULT: <6 NG/L — SIGNIFICANT CHANGE UP (ref 0–51)
TSH SERPL-MCNC: 2.66 UIU/ML — SIGNIFICANT CHANGE UP (ref 0.27–4.2)

## 2022-08-26 PROCEDURE — 93458 L HRT ARTERY/VENTRICLE ANGIO: CPT | Mod: 26

## 2022-08-26 PROCEDURE — 76815 OB US LIMITED FETUS(S): CPT | Mod: 26

## 2022-08-26 PROCEDURE — 99253 IP/OBS CNSLTJ NEW/EST LOW 45: CPT | Mod: 25

## 2022-08-26 PROCEDURE — 93970 EXTREMITY STUDY: CPT | Mod: 26

## 2022-08-26 PROCEDURE — 93306 TTE W/DOPPLER COMPLETE: CPT | Mod: 26

## 2022-08-26 PROCEDURE — 99233 SBSQ HOSP IP/OBS HIGH 50: CPT

## 2022-08-26 PROCEDURE — 99292 CRITICAL CARE ADDL 30 MIN: CPT

## 2022-08-26 PROCEDURE — 93010 ELECTROCARDIOGRAM REPORT: CPT

## 2022-08-26 PROCEDURE — 99291 CRITICAL CARE FIRST HOUR: CPT

## 2022-08-26 RX ORDER — NITROGLYCERIN 6.5 MG
0.4 CAPSULE, EXTENDED RELEASE ORAL ONCE
Refills: 0 | Status: COMPLETED | OUTPATIENT
Start: 2022-08-26 | End: 2022-08-26

## 2022-08-26 RX ORDER — METOPROLOL TARTRATE 50 MG
25 TABLET ORAL DAILY
Refills: 0 | Status: DISCONTINUED | OUTPATIENT
Start: 2022-08-26 | End: 2022-08-27

## 2022-08-26 RX ORDER — CHLORHEXIDINE GLUCONATE 213 G/1000ML
1 SOLUTION TOPICAL
Refills: 0 | Status: DISCONTINUED | OUTPATIENT
Start: 2022-08-26 | End: 2022-08-27

## 2022-08-26 RX ORDER — SODIUM CHLORIDE 9 MG/ML
250 INJECTION INTRAMUSCULAR; INTRAVENOUS; SUBCUTANEOUS ONCE
Refills: 0 | Status: COMPLETED | OUTPATIENT
Start: 2022-08-26 | End: 2022-08-26

## 2022-08-26 RX ORDER — SODIUM CHLORIDE 9 MG/ML
250 INJECTION INTRAMUSCULAR; INTRAVENOUS; SUBCUTANEOUS
Refills: 0 | Status: DISCONTINUED | OUTPATIENT
Start: 2022-08-26 | End: 2022-08-26

## 2022-08-26 RX ORDER — ACETAMINOPHEN 500 MG
650 TABLET ORAL ONCE
Refills: 0 | Status: COMPLETED | OUTPATIENT
Start: 2022-08-26 | End: 2022-08-26

## 2022-08-26 RX ORDER — FAMOTIDINE 10 MG/ML
20 INJECTION INTRAVENOUS ONCE
Refills: 0 | Status: COMPLETED | OUTPATIENT
Start: 2022-08-26 | End: 2022-08-26

## 2022-08-26 RX ADMIN — Medication 0.4 MILLIGRAM(S): at 07:20

## 2022-08-26 RX ADMIN — FAMOTIDINE 20 MILLIGRAM(S): 10 INJECTION INTRAVENOUS at 21:13

## 2022-08-26 RX ADMIN — Medication 25 MILLIGRAM(S): at 21:19

## 2022-08-26 RX ADMIN — Medication 1 TABLET(S): at 21:12

## 2022-08-26 RX ADMIN — Medication 650 MILLIGRAM(S): at 21:58

## 2022-08-26 RX ADMIN — SODIUM CHLORIDE 250 MILLILITER(S): 9 INJECTION INTRAMUSCULAR; INTRAVENOUS; SUBCUTANEOUS at 12:26

## 2022-08-26 RX ADMIN — Medication 650 MILLIGRAM(S): at 21:28

## 2022-08-26 NOTE — CHART NOTE - NSCHARTNOTEFT_GEN_A_CORE
Removal of Femoral Sheath    Pulses in the right lower extremity were visualized via pulse ox and palpable. The patient was placed in the supine position. The insertion site was identified and the sutures were removed per protocol.    The 4 Polish femoral sheath was then removed. Direct pressure was applied for 23 minutes.     Monitoring of the right groin and both lower extremities including neuro-vascular checks and vital signs every 15 minutes x 4, then every 30 minutes x 2, then every 1 hour was ordered.    Complications: None

## 2022-08-26 NOTE — PROGRESS NOTE ADULT - SUBJECTIVE AND OBJECTIVE BOX
Subjective  The patient has continued to have intermittent episodes of chest discomfort that is radiating upwards to her left shoulder.  This occurs unprovoked in nature.  Not associated with food intake or taking big deep breaths.  No change in her GERD chest discomfort along with chest pressure sensation.    Review of systems  14 point review of systems otherwise unremarkable except what described above in history of present illness    Physical examination   Blood pressure was checked  Right arm 114/67  Left arm 1/15/1964  Heart rate 70s oxygenation 100% on room air  No apparent distress, alert and oriented 3, appropriate affect no JVD not elevated, supple, no lymphadenopathy and clear to auscultation bilaterally no wheezing rhonchi crackles  Regular rate and rhythm with no murmur rub or gallop   Positive bowel sound, soft, nontender, nondistended, no masses guarding rebound tenderness   No clubbing, cyanosis or edema  Moving all extremity spontaneously  2+ DP/PT pulses no focal deficits    Assessment/plan  Chest discomfort  --It is unclear what is the etiology of the patient's discomfort.  She is found to have a left bundle branch block (no prior EKG has been performed).  QTC prolonged.  Patient did get nitroglycerin which improved her chest discomfort that was radiating to her left shoulder.  -- Pending TTE and venous duplex study.    -- Cardiac enzymes within normal limits.    -- Concern that the patient may be experiencing SCAD.  Discussed different ways for SCAD to be diagnosed including CTA and coronary angiography.  The pros/cons and the risk/benefits of the various treatment options were gone over.  Indications and details for cardiac catheterization were reviewed.  Benefits and risks were explained.  Risks include but are not limited to infection, bleeding, arrhythmia, TIA/stroke, hemodynamic instability, vascular injury, need for urgent surgery and death.  -- If the above work-up is unremarkable would recommend that a CTA of the chest be performed to rule out pulmonary embolism.  --Would recommend that a daily EKG be performed.  -- The patient will be transferred to the CICU for further observation/management.  -- Continue telemetry monitoring.  -- Aim for potassium greater than 4 magnesium greater than 2.    All questions and concerns of the patient and her  were addressed.    Events that transpired leading to the patient's current hospitalization reviewed along with hospital course.  The patient's past medical history/surgical history/family history/social history was gone over.  Agree with physical examination as noted above.    The patient now presents with submassive pulmonary embolism that was likely provoked secondary to recent hospitalization/inflammation/surgery.    Would recommend the patient be continued on anticoagulation therapy would closely monitor for signs and signs of bleeding and perform serial CBCs.    On examination and history taking of the patient she reports left upper extremity swelling/discomfort.  Would recommend that upper extremity venous duplex studies be performed.    Reviewed with patient findings from lower extremity venous duplex study and TTE.    All questions and concerns to the patient and her  were addressed.    Laboratory studies and imaging studies were personally reviewed.    No acute events reported overnight.  The patient reports that she is very tired from frequent awakening.  Denies any cardiopulmonary complaints at rest except feeling fatigued.  Denies any blood in her stool or urine.  She has been transitioned to Lovenox.  BNP and troponin are downtrending.    The patient's pulmonary embolism was likely provoked in nature.  Preliminary report positive for left upper extremity DVT.  Continue Lovenox 90 mg subcu every 12 hours.  Closely monitor for signs and symptoms of bleeding.  Would consider prior to discharge transitioning the patient to an oral anticoagulant (i.e.  Xarelto or Eliquis).  As an outpatient she will need to have repeat TTE and venous duplex study to reassess right-sided heart enlargement/pulmonary hypertension and resolution of clot in left arm.    Follow-up final read of upper extremity venous duplex study.    Closely monitor for signs and symptoms of bleeding.    All questions and concerns to the patient and her  were addressed.    Findings and plan were discussed with cardiology OB, MFM, cardiology nursing team, CICU/Dr. Woods and interventional cardiology.

## 2022-08-26 NOTE — DISCHARGE NOTE ANTEPARTUM - PATIENT PORTAL LINK FT
You can access the FollowMyHealth Patient Portal offered by [facility] by registering at the following website: [email or web address]. By joining [facility] FollowMyHealth portal, you will also be able to view your health information using other applications (apps) compatible with our system.

## 2022-08-26 NOTE — CHART NOTE - NSCHARTNOTEFT_GEN_A_CORE
CCU Transfer Note    Transfer from: CCU  Transfer to:  (  ) Medicine    ( X ) Telemetry    (  ) RCU    (  ) Palliative    (  ) Stroke Unit    (  ) _______________  Accepting physician:    MEDICATIONS:  STANDING MEDICATIONS  chlorhexidine 4% Liquid 1 Application(s) Topical <User Schedule>  famotidine Injectable 20 milliGRAM(s) IV Push once  metoprolol succinate ER 25 milliGRAM(s) Oral daily  prenatal multivitamin 1 Tablet(s) Oral daily  sodium chloride 0.9%. 250 milliLiter(s) IV Continuous <Continuous>    PRN MEDICATIONS      VITAL SIGNS: Last 24 Hours  T(C): 36.9 (26 Aug 2022 14:22), Max: 37.6 (25 Aug 2022 20:38)  T(F): 98.5 (26 Aug 2022 14:22), Max: 99.7 (25 Aug 2022 21:00)  HR: 93 (26 Aug 2022 18:00) (73 - 118)  BP: 120/71 (26 Aug 2022 17:00) (109/79 - 147/88)  BP(mean): 89 (26 Aug 2022 17:00) (83 - 112)  RR: 19 (26 Aug 2022 18:00) (16 - 31)  SpO2: 100% (26 Aug 2022 18:00) (81% - 100%)    LABS:                        11.4   9.53  )-----------( 364      ( 25 Aug 2022 18:02 )             33.4     08-    137  |  104  |  10  ----------------------------<  89  3.8   |  23  |  0.56    Ca    8.5      25 Aug 2022 18:04    TPro  6.1  /  Alb  3.6  /  TBili  0.8  /  DBili  x   /  AST  21  /  ALT  16  /  AlkPhos  36<L>  08    PT/INR - ( 25 Aug 2022 18:03 )   PT: 10.7 sec;   INR: 0.92 ratio         PTT - ( 25 Aug 2022 18:03 )  PTT:25.8 sec  Urinalysis Basic - ( 25 Aug 2022 19:53 )    Color: Light Yellow / Appearance: Slightly Turbid / S.013 / pH: x  Gluc: x / Ketone: Trace  / Bili: Negative / Urobili: Negative   Blood: x / Protein: Trace / Nitrite: Negative   Leuk Esterase: Moderate / RBC: 3-5 /hpf / WBC 7 /HPF   Sq Epi: x / Non Sq Epi: 4 /hpf / Bacteria: Occasional              RADIOLOGY:    HPI 32 year old  @ 20+1 weeks (NERISSA: 23) presents to the ED s/p a fall at 3pm. Patient states that at this time she was standing on the top of the stairs when she suddenly began to feel lower abdominal pain and dizzy. Because she thought she was about to synopsize, the patient sat down. Per the patient, the next thing she remembers is waking up at the bottom of the stairs sitting on her bottom. Patient thinks that she slid down the stairs on her bottom; however, she is unsure, as she was not conscious when she went down the stairs. Patient states that since the fall she has been feeling constant, lower back pain that she rates as an 8/10. She received Tylenol in the ED for the pain; however, states that it only marginally helped. Patient states that the back pain radiates to her lower abdomen. States that the pain in her lower abdomen comes and goes and she rates this pain as a 5/10. Of note, the syncopal episode was unwitnessed; however, she denies any urinary or bowel incontinence. Denies any tongue biting. Also states that when she regained consciousness she did not feel any increase in tiredness. Of note patient states that she had these presyncopal feelings multiple times in the past year that have intensified during pregnancy. Of note patient denies ever losing consciousness in the past. Patient states that these feelings are normally precipitated by warm temperatures or standing for prolonged periods of time. Patient denies any family history of anyone suffering from episodes akin to this. At time of OBGYN evaluation in the ED, patient still endorsed lightheadedness and dizziness and stated that she did not think she would be able to get out of bed without a syncopal event. Patient endorsed intermittent palpitations. Denies nausea, vomiting, chest pain at time of OBGYN initial evaluation.    CCU: Patient was complaining of chest pain and found to have a new left bundle branch block. Was emergent transferred to the cath lab and transferred to CCU for further monitoring. Cath was unremarkable with no SCAD or CAD. 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 EP consulted and following patient, started on toprol. Patient stable for transfer to the floor.          ASSESSMENT & PLAN: #Neuro  Syncope; convulsive syncope possible vasovagal vs v arrythmia  - cont to monitor    #Resp  - no active issues    #CV  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 May be peripartum cardiomyopathy causing conduction disease  - Troponins negative  - Cath unremarkable with no evidence of SCAD  - TTE showing HFmEF (40-45%) with signs of non compaction  - EP followinng  - toprol    #Renal/  Asymptomatic bacteriuria in the setting of pregnancy on UA  - Per OBGYN, will hold off on antibiotics and wait on urine culture    Pregnancy  - OBGYN following    #GI  - Regular diet    #Endocrine  - F/u TSH, lipid, a1c    #Heme/onc  Normocytic anemia in the setting of pregnancy  - Cont to monitor  - iron studies    #ID  - no active issues.        For Follow-Up: [ ] F/u Urine culture  [ ] OBGYN recs  [ ] CardOBGYN recs  [ ] EP recs CCU Transfer Note    Transfer from: CCU  Transfer to:  (  ) Medicine    ( X ) Telemetry    (  ) RCU    (  ) Palliative    (  ) Stroke Unit    (  ) _______________  Accepting physician:    MEDICATIONS:  STANDING MEDICATIONS  chlorhexidine 4% Liquid 1 Application(s) Topical <User Schedule>  famotidine Injectable 20 milliGRAM(s) IV Push once  metoprolol succinate ER 25 milliGRAM(s) Oral daily  prenatal multivitamin 1 Tablet(s) Oral daily  sodium chloride 0.9%. 250 milliLiter(s) IV Continuous <Continuous>    PRN MEDICATIONS      VITAL SIGNS: Last 24 Hours  T(C): 36.9 (26 Aug 2022 14:22), Max: 37.6 (25 Aug 2022 20:38)  T(F): 98.5 (26 Aug 2022 14:22), Max: 99.7 (25 Aug 2022 21:00)  HR: 93 (26 Aug 2022 18:00) (73 - 118)  BP: 120/71 (26 Aug 2022 17:00) (109/79 - 147/88)  BP(mean): 89 (26 Aug 2022 17:00) (83 - 112)  RR: 19 (26 Aug 2022 18:00) (16 - 31)  SpO2: 100% (26 Aug 2022 18:00) (81% - 100%)    LABS:                        11.4   9.53  )-----------( 364      ( 25 Aug 2022 18:02 )             33.4     08-    137  |  104  |  10  ----------------------------<  89  3.8   |  23  |  0.56    Ca    8.5      25 Aug 2022 18:04    TPro  6.1  /  Alb  3.6  /  TBili  0.8  /  DBili  x   /  AST  21  /  ALT  16  /  AlkPhos  36<L>  08    PT/INR - ( 25 Aug 2022 18:03 )   PT: 10.7 sec;   INR: 0.92 ratio         PTT - ( 25 Aug 2022 18:03 )  PTT:25.8 sec  Urinalysis Basic - ( 25 Aug 2022 19:53 )    Color: Light Yellow / Appearance: Slightly Turbid / S.013 / pH: x  Gluc: x / Ketone: Trace  / Bili: Negative / Urobili: Negative   Blood: x / Protein: Trace / Nitrite: Negative   Leuk Esterase: Moderate / RBC: 3-5 /hpf / WBC 7 /HPF   Sq Epi: x / Non Sq Epi: 4 /hpf / Bacteria: Occasional              RADIOLOGY:    HPI 32 year old  @ 20+1 weeks (NERISSA: 23) presents to the ED s/p a fall at 3pm. Patient states that at this time she was standing on the top of the stairs when she suddenly began to feel lower abdominal pain and dizzy. Because she thought she was about to synopsize, the patient sat down. Per the patient, the next thing she remembers is waking up at the bottom of the stairs sitting on her bottom. Patient thinks that she slid down the stairs on her bottom; however, she is unsure, as she was not conscious when she went down the stairs. Patient states that since the fall she has been feeling constant, lower back pain that she rates as an 8/10. She received Tylenol in the ED for the pain; however, states that it only marginally helped. Patient states that the back pain radiates to her lower abdomen. States that the pain in her lower abdomen comes and goes and she rates this pain as a 5/10. Of note, the syncopal episode was unwitnessed; however, she denies any urinary or bowel incontinence. Denies any tongue biting. Also states that when she regained consciousness she did not feel any increase in tiredness. Of note patient states that she had these presyncopal feelings multiple times in the past year that have intensified during pregnancy. Of note patient denies ever losing consciousness in the past. Patient states that these feelings are normally precipitated by warm temperatures or standing for prolonged periods of time. Patient denies any family history of anyone suffering from episodes akin to this. At time of OBGYN evaluation in the ED, patient still endorsed lightheadedness and dizziness and stated that she did not think she would be able to get out of bed without a syncopal event. Patient endorsed intermittent palpitations. Denies nausea, vomiting, chest pain at time of OBGYN initial evaluation.    CCU: Patient was complaining of chest pain and found to have a new left bundle branch block. Was emergent transferred to the cath lab and transferred to CCU for further monitoring. Cath was unremarkable with no SCAD or CAD. 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 EP consulted and following patient, started on toprol. Patient stable for transfer to the floor.          ASSESSMENT & PLAN: #Neuro  Syncope; convulsive syncope possible vasovagal vs v arrythmia  - cont to monitor    #Resp  - no active issues    #CV  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 May be peripartum cardiomyopathy causing conduction disease vs noncompaction cardiomyopathy  - Troponins negative  - Cath unremarkable with no evidence of SCAD  - TTE showing HFmEF (40-45%) with signs of non compaction  - EP followinng  - toprol    #Renal/  Asymptomatic bacteriuria in the setting of pregnancy on UA  - Per OBGYN, will hold off on antibiotics and wait on urine culture    Pregnancy  - OBGYN following    #GI  - Regular diet    #Endocrine  - F/u TSH, lipid, a1c    #Heme/onc  Normocytic anemia in the setting of pregnancy  - Cont to monitor  - iron studies    #ID  - no active issues.        For Follow-Up: [ ] F/u Urine culture  [ ] OBGYN recs  [ ] CardOBGYN recs  [ ] EP recs CCU Transfer Note    Transfer from: CCU  Transfer to:  (  ) Medicine    ( X ) Telemetry    (  ) RCU    (  ) Palliative    (  ) Stroke Unit    (  ) _______________  Accepting physician:    MEDICATIONS:  STANDING MEDICATIONS  chlorhexidine 4% Liquid 1 Application(s) Topical <User Schedule>  famotidine Injectable 20 milliGRAM(s) IV Push once  metoprolol succinate ER 25 milliGRAM(s) Oral daily  prenatal multivitamin 1 Tablet(s) Oral daily  sodium chloride 0.9%. 250 milliLiter(s) IV Continuous <Continuous>    PRN MEDICATIONS      VITAL SIGNS: Last 24 Hours  T(C): 36.9 (26 Aug 2022 14:22), Max: 37.6 (25 Aug 2022 20:38)  T(F): 98.5 (26 Aug 2022 14:22), Max: 99.7 (25 Aug 2022 21:00)  HR: 93 (26 Aug 2022 18:00) (73 - 118)  BP: 120/71 (26 Aug 2022 17:00) (109/79 - 147/88)  BP(mean): 89 (26 Aug 2022 17:00) (83 - 112)  RR: 19 (26 Aug 2022 18:00) (16 - 31)  SpO2: 100% (26 Aug 2022 18:00) (81% - 100%)    LABS:                        11.4   9.53  )-----------( 364      ( 25 Aug 2022 18:02 )             33.4     08-    137  |  104  |  10  ----------------------------<  89  3.8   |  23  |  0.56    Ca    8.5      25 Aug 2022 18:04    TPro  6.1  /  Alb  3.6  /  TBili  0.8  /  DBili  x   /  AST  21  /  ALT  16  /  AlkPhos  36<L>  08    PT/INR - ( 25 Aug 2022 18:03 )   PT: 10.7 sec;   INR: 0.92 ratio         PTT - ( 25 Aug 2022 18:03 )  PTT:25.8 sec  Urinalysis Basic - ( 25 Aug 2022 19:53 )    Color: Light Yellow / Appearance: Slightly Turbid / S.013 / pH: x  Gluc: x / Ketone: Trace  / Bili: Negative / Urobili: Negative   Blood: x / Protein: Trace / Nitrite: Negative   Leuk Esterase: Moderate / RBC: 3-5 /hpf / WBC 7 /HPF   Sq Epi: x / Non Sq Epi: 4 /hpf / Bacteria: Occasional              RADIOLOGY:    HPI 32 year old  @ 20+1 weeks (NERISSA: 23) presents to the ED s/p a fall at 3pm. Patient states that at this time she was standing on the top of the stairs when she suddenly began to feel lower abdominal pain and dizzy. Because she thought she was about to synopsize, the patient sat down. Per the patient, the next thing she remembers is waking up at the bottom of the stairs sitting on her bottom. Patient thinks that she slid down the stairs on her bottom; however, she is unsure, as she was not conscious when she went down the stairs. Patient states that since the fall she has been feeling constant, lower back pain that she rates as an 8/10. She received Tylenol in the ED for the pain; however, states that it only marginally helped. Patient states that the back pain radiates to her lower abdomen. States that the pain in her lower abdomen comes and goes and she rates this pain as a 5/10. Of note, the syncopal episode was unwitnessed; however, she denies any urinary or bowel incontinence. Denies any tongue biting. Also states that when she regained consciousness she did not feel any increase in tiredness. Of note patient states that she had these presyncopal feelings multiple times in the past year that have intensified during pregnancy. Of note patient denies ever losing consciousness in the past. Patient states that these feelings are normally precipitated by warm temperatures or standing for prolonged periods of time. Patient denies any family history of anyone suffering from episodes akin to this. At time of OBGYN evaluation in the ED, patient still endorsed lightheadedness and dizziness and stated that she did not think she would be able to get out of bed without a syncopal event. Patient endorsed intermittent palpitations. Denies nausea, vomiting, chest pain at time of OBGYN initial evaluation.    CCU course: Patient was complaining of chest pain and found to have a new left bundle branch block. Was emergent transferred to the cath lab and transferred to CCU for further monitoring. Cath was unremarkable with no SCAD or CAD. 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 EP consulted and following patient, started on toprol. Patient stable for transfer to the floor.          ASSESSMENT & PLAN: #Neuro  Syncope; convulsive syncope possible vasovagal vs v arrythmia  - cont to monitor    #Resp  - no active issues    #CV  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 May be peripartum cardiomyopathy causing conduction disease vs noncompaction cardiomyopathy  - Troponins negative  - Cath unremarkable with no evidence of SCAD  - TTE showing HFmEF (40-45%) with signs of non compaction  - EP followinng  - toprol    #Renal/  Asymptomatic bacteriuria in the setting of pregnancy on UA  - Per OBGYN, will hold off on antibiotics and wait on urine culture    Pregnancy  - OBGYN following    #GI  - Regular diet    #Endocrine  - F/u TSH, lipid, a1c    #Heme/onc  Normocytic anemia in the setting of pregnancy  - Cont to monitor  - iron studies    #ID  - no active issues.        For Follow-Up: [ ] F/u Urine culture  [ ] OBGYN recs  [ ] CardOBGYN recs  [ ] EP recs CCU Transfer Note    Transfer from: CCU  Transfer to:  (  ) Medicine    ( X ) Telemetry    (  ) RCU    (  ) Palliative    (  ) Stroke Unit    (  ) _______________  Accepting physician:    MEDICATIONS:  STANDING MEDICATIONS  chlorhexidine 4% Liquid 1 Application(s) Topical <User Schedule>  famotidine Injectable 20 milliGRAM(s) IV Push once  metoprolol succinate ER 25 milliGRAM(s) Oral daily  prenatal multivitamin 1 Tablet(s) Oral daily  sodium chloride 0.9%. 250 milliLiter(s) IV Continuous <Continuous>    PRN MEDICATIONS      VITAL SIGNS: Last 24 Hours  T(C): 36.9 (26 Aug 2022 14:22), Max: 37.6 (25 Aug 2022 20:38)  T(F): 98.5 (26 Aug 2022 14:22), Max: 99.7 (25 Aug 2022 21:00)  HR: 93 (26 Aug 2022 18:00) (73 - 118)  BP: 120/71 (26 Aug 2022 17:00) (109/79 - 147/88)  BP(mean): 89 (26 Aug 2022 17:00) (83 - 112)  RR: 19 (26 Aug 2022 18:00) (16 - 31)  SpO2: 100% (26 Aug 2022 18:00) (81% - 100%)    LABS:                        11.4   9.53  )-----------( 364      ( 25 Aug 2022 18:02 )             33.4     08-    137  |  104  |  10  ----------------------------<  89  3.8   |  23  |  0.56    Ca    8.5      25 Aug 2022 18:04    TPro  6.1  /  Alb  3.6  /  TBili  0.8  /  DBili  x   /  AST  21  /  ALT  16  /  AlkPhos  36<L>  08    PT/INR - ( 25 Aug 2022 18:03 )   PT: 10.7 sec;   INR: 0.92 ratio         PTT - ( 25 Aug 2022 18:03 )  PTT:25.8 sec  Urinalysis Basic - ( 25 Aug 2022 19:53 )    Color: Light Yellow / Appearance: Slightly Turbid / S.013 / pH: x  Gluc: x / Ketone: Trace  / Bili: Negative / Urobili: Negative   Blood: x / Protein: Trace / Nitrite: Negative   Leuk Esterase: Moderate / RBC: 3-5 /hpf / WBC 7 /HPF   Sq Epi: x / Non Sq Epi: 4 /hpf / Bacteria: Occasional              RADIOLOGY:    HPI 32 year old  @ 20+1 weeks (NERISSA: 23) presents to the ED s/p a fall at 3pm. Patient states that at this time she was standing on the top of the stairs when she suddenly began to feel lower abdominal pain and dizzy. Because she thought she was about to synopsize, the patient sat down. Per the patient, the next thing she remembers is waking up at the bottom of the stairs sitting on her bottom. Patient thinks that she slid down the stairs on her bottom; however, she is unsure, as she was not conscious when she went down the stairs. Patient states that since the fall she has been feeling constant, lower back pain that she rates as an 8/10. She received Tylenol in the ED for the pain; however, states that it only marginally helped. Patient states that the back pain radiates to her lower abdomen. States that the pain in her lower abdomen comes and goes and she rates this pain as a 5/10. Of note, the syncopal episode was unwitnessed; however, she denies any urinary or bowel incontinence. Denies any tongue biting. Also states that when she regained consciousness she did not feel any increase in tiredness. Of note patient states that she had these presyncopal feelings multiple times in the past year that have intensified during pregnancy. Of note patient denies ever losing consciousness in the past. Patient states that these feelings are normally precipitated by warm temperatures or standing for prolonged periods of time. Patient denies any family history of anyone suffering from episodes akin to this. At time of OBGYN evaluation in the ED, patient still endorsed lightheadedness and dizziness and stated that she did not think she would be able to get out of bed without a syncopal event. Patient endorsed intermittent palpitations. Denies nausea, vomiting, chest pain at time of OBGYN initial evaluation.    CCU course: Patient was complaining of chest pain and found to have a new left bundle branch block. Was emergent transferred to the cath lab and transferred to CCU for further monitoring. Cath was unremarkable with no SCAD or CAD. 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 EP consulted and following patient, started on toprol. Patient stable for transfer to the floor.          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 May be peripartum cardiomyopathy causing conduction disease vs noncompaction cardiomyopathy  - Troponins negative  - Cath unremarkable with no evidence of SCAD  - TTE showing HFmEF (40-45%) with signs of non compaction  - EP followinng  - toprol    #Renal/  Asymptomatic bacteriuria in the setting of pregnancy on UA  - Per OBGYN, will hold off on antibiotics and wait on urine culture    Pregnancy  - OBGYN following    #GI  - Regular diet    #Endocrine  - F/u TSH, lipid, a1c    #Heme/onc  Normocytic anemia in the setting of pregnancy  - Cont to monitor  - iron studies    #ID  - no active issues.        For Follow-Up: [ ] F/u Urine culture  [ ] OBGYN recs  [ ] CardOBGYN recs  [ ] EP recs CCU Transfer Note    Transfer from: CCU  Transfer to:  (  ) Medicine    ( X ) Telemetry    (  ) RCU    (  ) Palliative    (  ) Stroke Unit    (  ) _______________  Accepting physician: Ronit    MEDICATIONS:  STANDING MEDICATIONS  chlorhexidine 4% Liquid 1 Application(s) Topical <User Schedule>  famotidine Injectable 20 milliGRAM(s) IV Push once  metoprolol succinate ER 25 milliGRAM(s) Oral daily  prenatal multivitamin 1 Tablet(s) Oral daily  sodium chloride 0.9%. 250 milliLiter(s) IV Continuous <Continuous>    PRN MEDICATIONS      VITAL SIGNS: Last 24 Hours  T(C): 36.9 (26 Aug 2022 14:22), Max: 37.6 (25 Aug 2022 20:38)  T(F): 98.5 (26 Aug 2022 14:22), Max: 99.7 (25 Aug 2022 21:00)  HR: 93 (26 Aug 2022 18:00) (73 - 118)  BP: 120/71 (26 Aug 2022 17:00) (109/79 - 147/88)  BP(mean): 89 (26 Aug 2022 17:00) (83 - 112)  RR: 19 (26 Aug 2022 18:00) (16 - 31)  SpO2: 100% (26 Aug 2022 18:00) (81% - 100%)    LABS:                        11.4   9.53  )-----------( 364      ( 25 Aug 2022 18:02 )             33.4     08-    137  |  104  |  10  ----------------------------<  89  3.8   |  23  |  0.56    Ca    8.5      25 Aug 2022 18:04    TPro  6.1  /  Alb  3.6  /  TBili  0.8  /  DBili  x   /  AST  21  /  ALT  16  /  AlkPhos  36<L>  08    PT/INR - ( 25 Aug 2022 18:03 )   PT: 10.7 sec;   INR: 0.92 ratio         PTT - ( 25 Aug 2022 18:03 )  PTT:25.8 sec  Urinalysis Basic - ( 25 Aug 2022 19:53 )    Color: Light Yellow / Appearance: Slightly Turbid / S.013 / pH: x  Gluc: x / Ketone: Trace  / Bili: Negative / Urobili: Negative   Blood: x / Protein: Trace / Nitrite: Negative   Leuk Esterase: Moderate / RBC: 3-5 /hpf / WBC 7 /HPF   Sq Epi: x / Non Sq Epi: 4 /hpf / Bacteria: Occasional              RADIOLOGY:    HPI 32 year old  @ 20+1 weeks (NERISSA: 23) presents to the ED s/p a fall at 3pm. Patient states that at this time she was standing on the top of the stairs when she suddenly began to feel lower abdominal pain and dizzy. Because she thought she was about to synopsize, the patient sat down. Per the patient, the next thing she remembers is waking up at the bottom of the stairs sitting on her bottom. Patient thinks that she slid down the stairs on her bottom; however, she is unsure, as she was not conscious when she went down the stairs. Patient states that since the fall she has been feeling constant, lower back pain that she rates as an 8/10. She received Tylenol in the ED for the pain; however, states that it only marginally helped. Patient states that the back pain radiates to her lower abdomen. States that the pain in her lower abdomen comes and goes and she rates this pain as a 5/10. Of note, the syncopal episode was unwitnessed; however, she denies any urinary or bowel incontinence. Denies any tongue biting. Also states that when she regained consciousness she did not feel any increase in tiredness. Of note patient states that she had these presyncopal feelings multiple times in the past year that have intensified during pregnancy. Of note patient denies ever losing consciousness in the past. Patient states that these feelings are normally precipitated by warm temperatures or standing for prolonged periods of time. Patient denies any family history of anyone suffering from episodes akin to this. At time of OBGYN evaluation in the ED, patient still endorsed lightheadedness and dizziness and stated that she did not think she would be able to get out of bed without a syncopal event. Patient endorsed intermittent palpitations. Denies nausea, vomiting, chest pain at time of OBGYN initial evaluation.    CCU course: Patient was complaining of chest pain and found to have a new left bundle branch block. Was emergent transferred to the cath lab and transferred to CCU for further monitoring. Cath was unremarkable with no SCAD or CAD. 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 EP consulted and following patient, started on toprol. Patient stable for transfer to the floor.          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 May be peripartum cardiomyopathy causing conduction disease vs noncompaction cardiomyopathy  - Troponins negative  - Cath unremarkable with no evidence of SCAD  - TTE showing HFmEF (40-45%) with signs of non compaction  - EP followinng  - toprol    #Renal/  Asymptomatic bacteriuria in the setting of pregnancy on UA  - Per OBGYN, will hold off on antibiotics and wait on urine culture    Pregnancy  - OBGYN following    #GI  - Regular diet    #Endocrine  - F/u TSH, lipid, a1c    #Heme/onc  Normocytic anemia in the setting of pregnancy  - Cont to monitor  - iron studies    #ID  - no active issues.        For Follow-Up: [ ] F/u Urine culture  [ ] OBGYN recs  [ ] CardOBGYN recs  [ ] EP recs CCU Transfer Note    Transfer from: CCU  Transfer to:  (  ) Medicine    ( X ) Telemetry    (  ) RCU    (  ) Palliative    (  ) Stroke Unit    (  ) _______________  Accepting physician: Sarah    MEDICATIONS:  STANDING MEDICATIONS  chlorhexidine 4% Liquid 1 Application(s) Topical <User Schedule>  famotidine Injectable 20 milliGRAM(s) IV Push once  metoprolol succinate ER 25 milliGRAM(s) Oral daily  prenatal multivitamin 1 Tablet(s) Oral daily  sodium chloride 0.9%. 250 milliLiter(s) IV Continuous <Continuous>    PRN MEDICATIONS      VITAL SIGNS: Last 24 Hours  T(C): 36.9 (26 Aug 2022 14:22), Max: 37.6 (25 Aug 2022 20:38)  T(F): 98.5 (26 Aug 2022 14:22), Max: 99.7 (25 Aug 2022 21:00)  HR: 93 (26 Aug 2022 18:00) (73 - 118)  BP: 120/71 (26 Aug 2022 17:00) (109/79 - 147/88)  BP(mean): 89 (26 Aug 2022 17:00) (83 - 112)  RR: 19 (26 Aug 2022 18:00) (16 - 31)  SpO2: 100% (26 Aug 2022 18:00) (81% - 100%)    LABS:                        11.4   9.53  )-----------( 364      ( 25 Aug 2022 18:02 )             33.4     08-    137  |  104  |  10  ----------------------------<  89  3.8   |  23  |  0.56    Ca    8.5      25 Aug 2022 18:04    TPro  6.1  /  Alb  3.6  /  TBili  0.8  /  DBili  x   /  AST  21  /  ALT  16  /  AlkPhos  36<L>  08    PT/INR - ( 25 Aug 2022 18:03 )   PT: 10.7 sec;   INR: 0.92 ratio         PTT - ( 25 Aug 2022 18:03 )  PTT:25.8 sec  Urinalysis Basic - ( 25 Aug 2022 19:53 )    Color: Light Yellow / Appearance: Slightly Turbid / S.013 / pH: x  Gluc: x / Ketone: Trace  / Bili: Negative / Urobili: Negative   Blood: x / Protein: Trace / Nitrite: Negative   Leuk Esterase: Moderate / RBC: 3-5 /hpf / WBC 7 /HPF   Sq Epi: x / Non Sq Epi: 4 /hpf / Bacteria: Occasional              RADIOLOGY:    HPI 32 year old  @ 20+1 weeks (NERISSA: 23) presents to the ED s/p a fall at 3pm. Patient states that at this time she was standing on the top of the stairs when she suddenly began to feel lower abdominal pain and dizzy. Because she thought she was about to synopsize, the patient sat down. Per the patient, the next thing she remembers is waking up at the bottom of the stairs sitting on her bottom. Patient thinks that she slid down the stairs on her bottom; however, she is unsure, as she was not conscious when she went down the stairs. Patient states that since the fall she has been feeling constant, lower back pain that she rates as an 8/10. She received Tylenol in the ED for the pain; however, states that it only marginally helped. Patient states that the back pain radiates to her lower abdomen. States that the pain in her lower abdomen comes and goes and she rates this pain as a 5/10. Of note, the syncopal episode was unwitnessed; however, she denies any urinary or bowel incontinence. Denies any tongue biting. Also states that when she regained consciousness she did not feel any increase in tiredness. Of note patient states that she had these presyncopal feelings multiple times in the past year that have intensified during pregnancy. Of note patient denies ever losing consciousness in the past. Patient states that these feelings are normally precipitated by warm temperatures or standing for prolonged periods of time. Patient denies any family history of anyone suffering from episodes akin to this. At time of OBGYN evaluation in the ED, patient still endorsed lightheadedness and dizziness and stated that she did not think she would be able to get out of bed without a syncopal event. Patient endorsed intermittent palpitations. Denies nausea, vomiting, chest pain at time of OBGYN initial evaluation.    CCU course: Patient was complaining of chest pain and found to have a new left bundle branch block. Was emergent transferred to the cath lab and transferred to CCU for further monitoring. Cath was unremarkable with no SCAD or CAD. 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 EP consulted and following patient, started on toprol. Patient stable for transfer to the floor.          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 May be peripartum cardiomyopathy causing conduction disease vs noncompaction cardiomyopathy  - Troponins negative  - Cath unremarkable with no evidence of SCAD  - TTE showing HFmEF (40-45%) with signs of non compaction  - EP followinng  - toprol    #Renal/  Asymptomatic bacteriuria in the setting of pregnancy on UA  - Per OBGYN, will hold off on antibiotics and wait on urine culture    Pregnancy  - OBGYN following    #GI  - Regular diet    #Endocrine  - F/u TSH, lipid, a1c    #Heme/onc  Normocytic anemia in the setting of pregnancy  - Cont to monitor  - iron studies    #ID  - no active issues.        For Follow-Up: [ ] F/u Urine culture  [ ] OBGYN recs  [ ] CardOBGYN recs  [ ] EP recs

## 2022-08-26 NOTE — PROGRESS NOTE ADULT - SUBJECTIVE AND OBJECTIVE BOX
MFM Consult Note     Patient seen and examined at bedside. 31 yo  @ 20 weeks and 2 days (NERISSA: 23) who presented to the ED after a fall on . Please see prior GYN note for full history. However, patient states that as of this morning she began having a new type of chest pain. She reports that it is in her left chest and radiates up to her shoulder. The pain is so severe that it does take her breathe away. It did improve with a dose of nitroglycerin. She has never had a pain like this before. She does note that she has been having GERD like pain which presents as more of a burning after she eats, however this is not the sensation she has been experiencing. She denies ever having a cardiac workup in the past. Her mother did have a heart attack 2 weeks ago at the age of 68 however that is her only family history of cardiac medical problems. Patient states she has otherwise been in her normal state of health. She is healthy outside of pregnancy. She does not that she has had some discomfort in her left lower leg, however it seems to go away after some movement. She denies any swelling or redness.     EKG in the ED: Left Bundle Branch Block, no prior to compare     PNC: This pregnancy has been overall uncomplicated thus far, on her anatomy scan PACs were noted in the fetus     ICU Vital Signs Last 24 Hrs  T(C): 36.8 (26 Aug 2022 09:53), Max: 37.6 (25 Aug 2022 20:38)  T(F): 98.24 (26 Aug 2022 09:53), Max: 99.7 (25 Aug 2022 21:00)  HR: 88 (26 Aug 2022 10:45) (73 - 118)  BP: 115/66 (26 Aug 2022 09:56) (114/66 - 119/76)  BP(mean): --  ABP: --  ABP(mean): --  RR: 18 (26 Aug 2022 09:53) (17 - 20)  SpO2: 100% (26 Aug 2022 10:45) (81% - 100%)    O2 Parameters below as of 25 Aug 2022 21:00  Patient On (Oxygen Delivery Method): room air        Gen: Patient laying with her head towards one side and hand over her left chest signifying discomfort  Pulm: non labored breathing   CV: RRR   Abd: soft, nontender                          11.4   9.53  )-----------( 364      ( 25 Aug 2022 18:02 )             33.4       137  |  104  |  10  ----------------------------<  89  3.8   |  23  |  0.56    Ca    8.5      25 Aug 2022 18:04    TPro  6.1  /  Alb  3.6  /  TBili  0.8  /  DBili  x   /  AST  21  /  ALT  16  /  AlkPhos  36<L>      BNP wnl and Troponin wnlx2

## 2022-08-26 NOTE — PROGRESS NOTE ADULT - SUBJECTIVE AND OBJECTIVE BOX
R3 Antepartum Note, HD#2    Patient seen and examined at bedside. Patient with complaints of 6/10 substernal chest pressure that comes and goes q5-10 min and lasts a few seconds. She reports that she has had chest pressure like this in the past but has never had a syncopal episode piror to last night nor has she sought cardiac work up. Pt denies LOF, VB, ctx, HA, fever epigastric pain, blurred vision, palpitations, SOB, N/V, fevers, and chills.    Vital Signs Last 24 Hours  T(C): 37.2 (08-26-22 @ 00:45), Max: 37.6 (08-25-22 @ 20:38)  HR: 98 (08-26-22 @ 06:51) (73 - 118)  BP: 114/67 (08-26-22 @ 06:09) (114/67 - 119/76)  RR: 17 (08-25-22 @ 21:00) (17 - 20)  SpO2: 88% (08-26-22 @ 06:51) (81% - 100%)    CAPILLARY BLOOD GLUCOSE          Physical Exam:  General: NAD  Abdomen: Soft, non-tender, gravid  Ext: No pain or swelling  FH present on admission     Labs:             11.4   9.53  )-----------( 364      ( 08-25 @ 18:02 )             33.4     08-25 @ 18:04    137  |  104  |  10  ----------------------------<  89  3.8   |  23  |  0.56    Ca    8.5      08-25 @ 18:04    TPro  6.1  /  Alb  3.6  /  TBili  0.8  /  DBili  x   /  AST  21  /  ALT  16  /  AlkPhos  36  08-25 @ 18:04    PT/INR - ( 08-25 @ 18:03 )   PT: 10.7 sec;   INR: 0.92 ratio    PTT - ( 08-25 @ 18:03 )  PTT:25.8 sec    Uric Acid: (08-25 @ 22:43)  --       Fibrinogen: (08-25 @ 22:43)  558      LDH: (08-25 @ 22:43)  --           MEDICATIONS  (STANDING):  nitroglycerin     SubLingual 0.4 milliGRAM(s) SubLingual once  prenatal multivitamin 1 Tablet(s) Oral daily    MEDICATIONS  (PRN):

## 2022-08-26 NOTE — PROGRESS NOTE ADULT - ASSESSMENT
A/P: 32 year old  @ 20 weeks 2 days (NERISSA: 23) with episode of a recent fall/ syncopal episode yesterday and today with new onset left chest pain. Cardiology at bedside at time and expressed acute concern for a ruptured coronary artery. Differential also includes PE at this time. Vitals overall stable, however symptoms and left bundle branch can be concerning for SCAD which is a higher risk in pregnancy. Patient counseled on risks and benefits of cath and CT in pregnancy. We discussed that given she is now in the second trimester the overall risk is low and concern would be for long term consequences such as pediatric cancers however this is at very high doses of radiation. We discussed that we would limit the overall doses as best as possible, however given the risk of life threatening conditions it is more important to proceed with diagnostic work up. The patient agrees with this plan. We will plan for fetal heart rate check before and after the cath.   - Appreciate excellent care by cardiology, plan for cath and CCU following   - Discussed that she could receive nitroglycerin or beta blocker as needed, at this time her BP is stable and cardiac health should be prioritized   - No fetal monitoring given fetus is previable, will repeat FH check post procedure   - Please see [facility] for ultrasound report, fetal PACs stable     All questions answered at this time     Carly Hirschberg, MFM Fellow  Patient seen and examined with DOMITILA Muniz Attending

## 2022-08-26 NOTE — DISCHARGE NOTE ANTEPARTUM - MEDICATION SUMMARY - MEDICATIONS TO TAKE
I will START or STAY ON the medications listed below when I get home from the hospital:    Prenatal Multivitamins with Folic Acid 1 mg oral tablet  -- 1 tab(s) by mouth once a day  -- Indication: For Vitamins   I will START or STAY ON the medications listed below when I get home from the hospital:    Toprol-XL 25 mg oral tablet, extended release  -- 0.5 tab(s) by mouth once a day MDD:12.5mg daily  -- It is very important that you take or use this exactly as directed.  Do not skip doses or discontinue unless directed by your doctor.  May cause drowsiness or dizziness.  Some non-prescription drugs may aggravate your condition.  Read all labels carefully.  If a warning appears, check with your doctor before taking.  Swallow whole.  Do not crush.  Take with food or milk.  This drug may impair the ability to drive or operate machinery.  Use care until you become familiar with its effects.    -- Indication: For Cardiomyopathy    Prenatal Multivitamins with Folic Acid 1 mg oral tablet  -- 1 tab(s) by mouth once a day  -- Indication: For Vitamins

## 2022-08-26 NOTE — PATIENT PROFILE ADULT - FALL HARM RISK - HARM RISK INTERVENTIONS

## 2022-08-26 NOTE — CONSULT NOTE ADULT - ASSESSMENT
32 year old  @ 20+1 weeks (NERISSA: 23) with no medical history who presented to the ED 22 s/p a fall at 3pm. Patient states that at this time she was walking to the top of the stairs when she suddenly began to feel lower abdominal pain, dizzy and like the room was spinning. She felt like she might syncopize so she tried to sit down, and then awoke at the bottom of the stairs. She was found to have new LBBB. Echo with an EF 40-45% and suggestive of LVNC.    1. Syncope  2. LBBB  3. EF 40-45%, suggestive LVNC    - New LBBB and CP s/p negative cath for CAD/SCAD  - Echo with EF 40-45% and suggestive of LVNC  - Unable to obtain cMRI given pregnancy status  - Syncope sounds vasovagal  - Continue close telemetry monitoring  - Start Toprol XL 25 mg daily  - Likely discharge with 30 day Biotel monitor  - Maintain K>4.0 and Mg>2.0   - Discussed with CICU team

## 2022-08-26 NOTE — DISCHARGE NOTE ANTEPARTUM - HOSPITAL COURSE
32 year old  @ 20+1 weeks (NERISSA: 23) presented to the ED s/p a fall at 3pm. Patient began to feel abdominal pain and lightheaded. She subsequently lost conscious and regained consciousness after falling down 14 carpeted steps. Patient thinks she slid down all 14 steps on her behind and is not sure if she hit her abdomen. After the fall the patient complained of constant lower back pain as well intermittent lower pelvic pain. In the ED vital signs were stable. On physical exam, the os appeared closed on speculum and digital exam. No bleeding was noted from the OS. EKG in the ED showed a left bundle branch block. Patient to be admitted for further workup of the LBBB as well as rule out  labor. Cardiology recommended sublingual Nitroglycerin () for complaints of chest pain in the setting of left bundle branch block. Troponin trend: 7->    TTE reveled .....    Upon discharge patient had stable VS, pain well controlled, ambulating without difficulty and voiding spontaneously. Patient will have close outpatient follow up with OBGYN and Cardiology and was counseled on strict return precautions.    32 year old  @ 20+1 weeks (NERISSA: 23) presented to the ED s/p a fall at 3pm. Patient began to feel abdominal pain and lightheaded. She subsequently lost conscious and regained consciousness after falling down 14 carpeted steps. Patient thinks she slid down all 14 steps on her behind and is not sure if she hit her abdomen. After the fall the patient complained of constant lower back pain as well intermittent lower pelvic pain. In the ED vital signs were stable. On physical exam, the os appeared closed on speculum and digital exam. No bleeding was noted from the OS. EKG in the ED showed a left bundle branch block. Patient to be admitted for further workup of the LBBB as well as rule out  labor. Cardiology recommended sublingual Nitroglycerin () for complaints of chest pain in the setting of left bundle branch block. Troponin wnl.    TTE reveled EF 40-45%  Conclusions:  1. Mitral annular calcification, otherwise normal mitral  valve. Minimal mitral regurgitation.  2. Normal trileaflet aortic valve. No aortic valve  regurgitation seen.  3. Mild-moderate global left ventricular systolic  dysfunction. There is increased trabeculation in the left  ventricular apex, suggestive of LVNC. Consider further  imaging with cMRI.  4. The right ventricle is not well visualized; grossly  normal right ventricular systolic function.    Upon discharge patient had stable VS, pain well controlled, ambulating without difficulty and voiding spontaneously. Patient will have close outpatient follow up with OBGYN and Cardiology and was counseled on strict return precautions.    32 year old  presented @ 20+1 weeks (NERISSA: 23) presented to the ED s/p a fall at 3pm (). Patient began to feel abdominal pain and lightheaded. She subsequently lost conscious and regained consciousness after falling down 14 carpeted steps. Patient thinks she slid down all 14 steps on her behind and is not sure if she hit her abdomen. After the fall the patient complained of constant lower back pain as well intermittent lower pelvic pain. In the ED vital signs were stable. On physical exam, the os appeared closed on speculum and digital exam. No bleeding was noted from the OS. EKG in the ED showed a left bundle branch block. Patient to be admitted for further workup of the LBBB as well as rule out  labor. Cardiology recommended sublingual Nitroglycerin () for complaints of chest pain in the setting of left bundle branch block. Troponin wnl. TTE revealed EF 40-45% with ventricular dysfunction and increased trabeculation in the left ventricular apex, suggestive of LVNC. Patient underwent cardiac catheterization  which was wnl. She was started on Toprol 25qd and HSQ 5000 TID and was transferred to the CICU for continuous telemetry monitoring. She had an MR w/o Gadolinium .....      Upon discharge patient had stable VS, pain well controlled, ambulating without difficulty and voiding spontaneously. Patient will have close outpatient follow up with OBGYN and Cardiology and was counseled on strict return precautions.    32 year old  presented @ 20+1 weeks (NERISSA: 23) presented to the ED s/p a fall at 3pm (). Patient began to feel abdominal pain and lightheaded. She subsequently lost conscious and regained consciousness after falling down 14 carpeted steps. Patient thinks she slid down all 14 steps on her behind and is not sure if she hit her abdomen. After the fall the patient complained of constant lower back pain as well intermittent lower pelvic pain. In the ED vital signs were stable. On physical exam, the os appeared closed on speculum and digital exam. No bleeding was noted from the OS. EKG in the ED showed a left bundle branch block. Patient to be admitted for further workup of the LBBB as well as rule out  labor. Cardiology recommended sublingual Nitroglycerin () for complaints of chest pain in the setting of left bundle branch block. Troponin wnl. TTE revealed EF 40-45% with ventricular dysfunction and increased trabeculation in the left ventricular apex, suggestive of LVNC. Patient underwent cardiac catheterization  which was wnl. She was started on Toprol 25qd and HSQ 5000 TID and was transferred to the CICU for continuous telemetry monitoring. She had an MR w/o Gadolinium  which was wnl and had 30 day Biotel placed on . Upon discharge patient had stable VS, pain well controlled, ambulating without difficulty and voiding spontaneously. Patient will have close outpatient follow up with OBGYN and Cardiology and was counseled on strict return precautions.

## 2022-08-26 NOTE — DISCHARGE NOTE ANTEPARTUM - CARE PROVIDER_API CALL
Shilpa Smith)  Obstetrics and Gynecology  [address] [facility], [address] [place] [address]  Phone: [phone]  Fax: [phone]  Follow Up Time: 1 week   Shilpa Smith)  Obstetrics and Gynecology  [address] [facility], [address] [place] [address]  Phone: [phone]  Fax: [phone]  Follow Up Time: 1 week    Soco Payne; PhD)  Cardiac Electrophysiology; Cardiovascular Disease; Internal Medicine  [facility] - Dept of Cardiology, [address] [place] [address]  Phone: [phone]  Fax: [phone]  Scheduled Appointment: 09/30/2022 01:15 PM   Shilpa Smith)  Obstetrics and Gynecology  [address] [facility], [address] [place] [address]  Phone: [phone]  Fax: [phone]  Follow Up Time: 1 week    Soco Payne; PhD)  Cardiac Electrophysiology; Cardiovascular Disease; Internal Medicine  [facility] - Dept of Cardiology, [address] [place] [address]  Phone: [phone]  Fax: [phone]  Scheduled Appointment: 09/30/2022 01:15 PM    Pauline Gomez)  Cardiovascular Disease; Internal Medicine; Nuclear Cardiology  [address] [place] [address]  Phone: [phone]  Fax: [phone]  Follow Up Time: 2 weeks

## 2022-08-26 NOTE — CHART NOTE - NSCHARTNOTEFT_GEN_A_CORE
patient arrived to [facility], accompanied by  ( 20 weeks pregnant, OBGYN team at bedside)   pt awake and alert in no acute distress   denies any chest pain or shortness of breath at this time   EKG noted ( LBBB, SR), unsure is baseline ECG   tele: SR 80-90s  vitals stable: /74 HR 98 RR 16/min O2 sat 98% Ra afebrile   Dr SWATI Nation at bedside obtaining consent for [facility] today  will cont to monitor on tele

## 2022-08-26 NOTE — CHART NOTE - NSCHARTNOTEFT_GEN_A_CORE
FH check done at bedside and found to be wnl.   Appreciate Cardiac ICU care. Please call with any questions.     Carly Hirschberg, MFM Fellow   Patient seen and evaluated with DOMITILA Muniz Attending

## 2022-08-26 NOTE — CHART NOTE - NSCHARTNOTEFT_GEN_A_CORE
R3 Chart note     Patient complaining of continued chest pain. Initially relieved by Nitroglycerin but now back q5 min. Denies palpitations, headache, fevers, abdominal pain, nausea, vomiting and edema    Tachycardic to 110s and -110/60    - STAT EKG  - ECHO lab contacted for STAT     d/w Cardiology Fellow  Liberty Perez PGY3

## 2022-08-26 NOTE — DISCHARGE NOTE ANTEPARTUM - CARE PROVIDERS DIRECT ADDRESSES
,DirectAddress_Unknown ,DirectAddress_Unknown,DirectAddress_Unknown ,DirectAddress_Unknown,DirectAddress_Unknown,[email or web address]

## 2022-08-26 NOTE — PROGRESS NOTE ADULT - ASSESSMENT
32 year old  @ 20+2 weeks (NERISSA: 23) presents to the ED s/p a fall at 3pm. Patient began to feel abdominal pain and lightheaded. She subsequently lost conscious and regained consciousness after falling down 14 carpeted steps. Patient thinks she slid down all 14 steps on her behind and is not sure if she hit her abdomen. After the fall the patient complained of constant lower back pain as well intermittent lower pelvic pain. In the ED vital signs were stable. On physical exam, the os appeared closed on speculum and digital exam. No bleeding was noted from the OS. EKG in the ED showed a left bundle branch block. Patient admitted for further workup of the LBBB as well as rule out  labor    # labor  -patient to be admitted to antepartum to r/o  labor  -cervical os closed on digital and speculum exam  -Roundup w/ no ctx   -BPP /8 overnight    #LBBB with chest pressure  - EKG reviewed with Cardiology fellow, recommend sublingual Nitroglycerin and follow up if this improves her pain   - Troponin 7, f/u STAT Troponin  - Appreciate cardiology recs  -echocardiogram in the AM    #maternal well being  -Rho nico administration  -Regular diet  - PNV    Liberty Perez PGY3

## 2022-08-26 NOTE — PROGRESS NOTE ADULT - SUBJECTIVE AND OBJECTIVE BOX
Ptr found to have lbbb on ekg and c/o chest pain relieved with nitro  cardiology consultation is concerned for coronary disease. Pt for cardiac cath asap and will be teransferred to CCU  All discussed with pt.  DOMITILA consulted

## 2022-08-26 NOTE — CONSULT NOTE ADULT - NS ATTEND AMEND GEN_ALL_CORE FT
History of events c/w vasovagal, ie in the heat, after prolonged standing.  Now with event of collapse slightly different but was associated with abdominal discomfort, ie may be c/w vasovagal.  However, work up has proven a LBBB, LV dysfunction and suggestion of LV noncompaction.  She does have a maternal history of family members with PPM at early age. Check MRI (nongad) to assess for noncompaction.  Genetic testing. OK to start beta blocker.

## 2022-08-26 NOTE — DISCHARGE NOTE ANTEPARTUM - PROVIDER TOKENS
PROVIDER:[TOKEN:[[id number]],FOLLOWUP:[1 week]] PROVIDER:[TOKEN:[[id number]],FOLLOWUP:[1 week]],PROVIDER:[TOKEN:[[id number]],SCHEDULEDAPPT:[09/30/2022],SCHEDULEDAPPTTIME:[01:15 PM]] PROVIDER:[TOKEN:[[id number]],FOLLOWUP:[1 week]],PROVIDER:[TOKEN:[[id number]],SCHEDULEDAPPT:[09/30/2022],SCHEDULEDAPPTTIME:[01:15 PM]],PROVIDER:[TOKEN:[[id number]],FOLLOWUP:[2 weeks]]

## 2022-08-26 NOTE — CONSULT NOTE ADULT - SUBJECTIVE AND OBJECTIVE BOX
CHIEF COMPLAINT: Syncope    HISTORY OF PRESENT ILLNESS: 32 year old  @ 20+1 weeks (NERISSA: 23) with no medical history who presented to the ED 22 s/p a fall at 3pm. Patient states that at this time she was walking to the top of the stairs when she suddenly began to feel lower abdominal pain, dizzy and like the room was spinning. She felt like she might syncopize so she tried to sit down, and then awoke at the bottom of the stairs. She thinks LOC was only seconds and slid down the stairs. She does report multiple prior episodes of pre syncope associated with dizziness, LH and diaphoresis while standing outside in the heat that improve when she sits down. She also reported CP that began last night, but denies prior syncope, palpitations, VEGA or SOB.    Upon arrival to the ED she was noted to have a new LBBB (never had an EKG). She underwent cardiac cath due to CP to r/o SCAD which was unremarkable. Echo revealed an EF of 40-45% and suggestive of LVNC.      Allergies:  No Known Allergies  	    MEDICATIONS:  famotidine Injectable 20 milliGRAM(s) IV Push once  chlorhexidine 4% Liquid 1 Application(s) Topical <User Schedule>  prenatal multivitamin 1 Tablet(s) Oral daily  sodium chloride 0.9%. 250 milliLiter(s) IV Continuous <Continuous>      PAST MEDICAL & SURGICAL HISTORY:  No pertinent past medical history      No significant past surgical history      FAMILY HISTORY:  M - CAD  Denies SCD      SOCIAL HISTORY:    [x ] Non-smoker  social ETOH use prior to pregnancy, none now  no recreational drug use      REVIEW OF SYSTEMS:  See HPI. Otherwise, 10 point ROS done and otherwise negative.    PHYSICAL EXAM:  T(C): 36.9 (22 @ 14:22), Max: 37.6 (22 @ 20:38)  HR: 91 (22 @ 15:30) (73 - 118)  BP: 123/70 (22 @ 15:30) (109/79 - 147/88)  RR: 21 (22 @ 15:30) (16 - 31)  SpO2: 98% (22 @ 15:30) (81% - 100%)  Wt(kg): --  I&O's Summary    25 Aug 2022 07:  -  26 Aug 2022 07:00  --------------------------------------------------------  IN: 0 mL / OUT: 500 mL / NET: -500 mL    26 Aug 2022 07:  -  26 Aug 2022 17:33  --------------------------------------------------------  IN: 240 mL / OUT: 0 mL / NET: 240 mL        Appearance: Normal	  Cardiovascular: Normal S1 S2, No JVD, No murmurs, No edema  Respiratory: Lungs clear to auscultation	  Psychiatry: A & O x 3, Mood & affect appropriate  Gastrointestinal:  Soft, Non-tender, + BS	  Skin: No rashes, No ecchymoses, No cyanosis	  Neurologic: Non-focal  Extremities: No clubbing, cyanosis or edema  Vascular: Peripheral pulses palpable 2+ bilaterally        LABS:	 	    CBC Full  -  ( 25 Aug 2022 18:02 )  WBC Count : 9.53 K/uL  Hemoglobin : 11.4 g/dL  Hematocrit : 33.4 %  Platelet Count - Automated : 364 K/uL  Mean Cell Volume : 90.3 fl  Mean Cell Hemoglobin : 30.8 pg  Mean Cell Hemoglobin Concentration : 34.1 gm/dL  Auto Neutrophil # : 7.77 K/uL  Auto Lymphocyte # : 0.95 K/uL  Auto Monocyte # : 0.72 K/uL  Auto Eosinophil # : 0.03 K/uL  Auto Basophil # : 0.02 K/uL  Auto Neutrophil % : 81.5 %  Auto Lymphocyte % : 10.0 %  Auto Monocyte % : 7.6 %  Auto Eosinophil % : 0.3 %  Auto Basophil % : 0.2 %        137  |  104  |  10  ----------------------------<  89  3.8   |  23  |  0.56    Ca    8.5      25 Aug 2022 18:04    TPro  6.1  /  Alb  3.6  /  TBili  0.8  /  DBili  x   /  AST  21  /  ALT  16  /  AlkPhos  36<L>        proBNP: Serum Pro-Brain Natriuretic Peptide: 78 pg/mL ( @ 18:04)    Lipid Profile:   HgA1c:   TSH: Thyroid Stimulating Hormone, Serum: 2.66 uIU/mL ( @ 18:02)      TELEMETRY: SR/ST 90-110s	      < from: Transthoracic Echocardiogram (22 @ 10:41) >  PROCEDURE: Transthoracic echocardiogram with 2-D, M-Mode  and complete spectral andcolor flow Doppler.  INDICATION: Chronic pulmonary embolism (I27.82)  ------------------------------------------------------------------------  Dimensions:    Normal Values:  LA:     3.0    2.0 - 4.0 cm  Ao:     2.3    2.0 - 3.8 cm  SEPTUM: 0.9    0.6 - 1.2 cm  PWT:    0.9    0.6 - 1.1 cm  LVIDd:  3.9    3.0 - 5.6 cm  LVIDs:  3.2    1.8 - 4.0 cm  Derived variables:  LVMI: 59 g/m2  RWT: 0.46  Fractional short: 18 %  EF (Visual Estimate): 40-45 %  Doppler Peak Velocity (m/sec): AoV=1.5  ------------------------------------------------------------------------  Observations:  Mitral Valve: Mitral annular calcification, otherwise  normal mitral valve. Minimal mitral regurgitation.  Aortic Valve/Aorta: Normal trileaflet aortic valve. Peak  transaortic valve gradient equals 9 mm Hg, estimated aortic  valve area equals 2.3 sqcm. No aortic valve regurgitation  seen. Peak left ventricular outflow tract gradient equals 6  mm Hg, mean gradient is equal to 4 mm Hg.  Aortic Root: 2.3 cm.  LVOT diameter: 1.9 cm.  Left Atrium: LA volume index = 15 cc/m2.  Left Ventricle: Mild-moderate global left ventricular  systolic dysfunction. There is increased trabeculation in  the left ventricular apex, suggestive of LVNC. Consider  further imaging with cMRI.Normal left ventricular  internal dimensions and wall thicknesses. Mild diastolic  dysfunction (Stage I).  Right Heart: Normal right atrium. The right ventricle is  not well visualized; grossly normal right ventricular  systolic function. Normal tricuspid valve. Minimal  tricuspid regurgitation. Normal pulmonic valve. No pulmonic  regurgitation.  Pericardium/Pleura: Normal pericardium with no pericardial  effusion.  ------------------------------------------------------------------------  Conclusions:  1. Mitral annular calcification, otherwise normal mitral  valve. Minimal mitral regurgitation.  2. Normal trileaflet aortic valve. No aortic valve  regurgitation seen.  3. Mild-moderate global left ventricular systolic  dysfunction. There is increased trabeculation in the left  ventricular apex, suggestive of LVNC. Consider further  imaging with cMRI.  4. The right ventricle is not well visualized; grossly  normal right ventricular systolic function.  *** No previous Echo exam.    < end of copied text >

## 2022-08-27 DIAGNOSIS — Z3A.00 WEEKS OF GESTATION OF PREGNANCY NOT SPECIFIED: ICD-10-CM

## 2022-08-27 DIAGNOSIS — O26.899 OTHER SPECIFIED PREGNANCY RELATED CONDITIONS, UNSPECIFIED TRIMESTER: ICD-10-CM

## 2022-08-27 LAB
ALBUMIN SERPL ELPH-MCNC: 3.1 G/DL — LOW (ref 3.3–5)
ALP SERPL-CCNC: 34 U/L — LOW (ref 40–120)
ALT FLD-CCNC: 18 U/L — SIGNIFICANT CHANGE UP (ref 10–45)
ANION GAP SERPL CALC-SCNC: 9 MMOL/L — SIGNIFICANT CHANGE UP (ref 5–17)
AST SERPL-CCNC: 21 U/L — SIGNIFICANT CHANGE UP (ref 10–40)
B BURGDOR C6 AB SER-ACNC: NEGATIVE — SIGNIFICANT CHANGE UP
B BURGDOR IGG+IGM SER-ACNC: 0.09 INDEX — SIGNIFICANT CHANGE UP (ref 0.01–0.89)
BASOPHILS # BLD AUTO: 0.03 K/UL — SIGNIFICANT CHANGE UP (ref 0–0.2)
BASOPHILS NFR BLD AUTO: 0.4 % — SIGNIFICANT CHANGE UP (ref 0–2)
BILIRUB SERPL-MCNC: 0.8 MG/DL — SIGNIFICANT CHANGE UP (ref 0.2–1.2)
BUN SERPL-MCNC: 11 MG/DL — SIGNIFICANT CHANGE UP (ref 7–23)
CALCIUM SERPL-MCNC: 8.5 MG/DL — SIGNIFICANT CHANGE UP (ref 8.4–10.5)
CHLORIDE SERPL-SCNC: 110 MMOL/L — HIGH (ref 96–108)
CO2 SERPL-SCNC: 20 MMOL/L — LOW (ref 22–31)
CREAT SERPL-MCNC: 0.58 MG/DL — SIGNIFICANT CHANGE UP (ref 0.5–1.3)
CULTURE RESULTS: SIGNIFICANT CHANGE UP
EGFR: 125 ML/MIN/1.73M2 — SIGNIFICANT CHANGE UP
EOSINOPHIL # BLD AUTO: 0.07 K/UL — SIGNIFICANT CHANGE UP (ref 0–0.5)
EOSINOPHIL NFR BLD AUTO: 0.9 % — SIGNIFICANT CHANGE UP (ref 0–6)
GLUCOSE SERPL-MCNC: 79 MG/DL — SIGNIFICANT CHANGE UP (ref 70–99)
HCT VFR BLD CALC: 30.8 % — LOW (ref 34.5–45)
HGB BLD-MCNC: 10.2 G/DL — LOW (ref 11.5–15.5)
IMM GRANULOCYTES NFR BLD AUTO: 0.4 % — SIGNIFICANT CHANGE UP (ref 0–1.5)
LYMPHOCYTES # BLD AUTO: 1.38 K/UL — SIGNIFICANT CHANGE UP (ref 1–3.3)
LYMPHOCYTES # BLD AUTO: 18.7 % — SIGNIFICANT CHANGE UP (ref 13–44)
MAGNESIUM SERPL-MCNC: 1.9 MG/DL — SIGNIFICANT CHANGE UP (ref 1.6–2.6)
MCHC RBC-ENTMCNC: 30.8 PG — SIGNIFICANT CHANGE UP (ref 27–34)
MCHC RBC-ENTMCNC: 33.1 GM/DL — SIGNIFICANT CHANGE UP (ref 32–36)
MCV RBC AUTO: 93.1 FL — SIGNIFICANT CHANGE UP (ref 80–100)
MONOCYTES # BLD AUTO: 0.65 K/UL — SIGNIFICANT CHANGE UP (ref 0–0.9)
MONOCYTES NFR BLD AUTO: 8.8 % — SIGNIFICANT CHANGE UP (ref 2–14)
NEUTROPHILS # BLD AUTO: 5.23 K/UL — SIGNIFICANT CHANGE UP (ref 1.8–7.4)
NEUTROPHILS NFR BLD AUTO: 70.8 % — SIGNIFICANT CHANGE UP (ref 43–77)
NRBC # BLD: 0 /100 WBCS — SIGNIFICANT CHANGE UP (ref 0–0)
PHOSPHATE SERPL-MCNC: 3.4 MG/DL — SIGNIFICANT CHANGE UP (ref 2.5–4.5)
PLATELET # BLD AUTO: 245 K/UL — SIGNIFICANT CHANGE UP (ref 150–400)
POTASSIUM SERPL-MCNC: 4 MMOL/L — SIGNIFICANT CHANGE UP (ref 3.5–5.3)
POTASSIUM SERPL-SCNC: 4 MMOL/L — SIGNIFICANT CHANGE UP (ref 3.5–5.3)
PROT SERPL-MCNC: 5.5 G/DL — LOW (ref 6–8.3)
RBC # BLD: 3.31 M/UL — LOW (ref 3.8–5.2)
RBC # FLD: 13.7 % — SIGNIFICANT CHANGE UP (ref 10.3–14.5)
SODIUM SERPL-SCNC: 139 MMOL/L — SIGNIFICANT CHANGE UP (ref 135–145)
SPECIMEN SOURCE: SIGNIFICANT CHANGE UP
WBC # BLD: 7.39 K/UL — SIGNIFICANT CHANGE UP (ref 3.8–10.5)
WBC # FLD AUTO: 7.39 K/UL — SIGNIFICANT CHANGE UP (ref 3.8–10.5)

## 2022-08-27 PROCEDURE — 99232 SBSQ HOSP IP/OBS MODERATE 35: CPT

## 2022-08-27 PROCEDURE — 93010 ELECTROCARDIOGRAM REPORT: CPT

## 2022-08-27 PROCEDURE — 99291 CRITICAL CARE FIRST HOUR: CPT

## 2022-08-27 PROCEDURE — 99233 SBSQ HOSP IP/OBS HIGH 50: CPT

## 2022-08-27 RX ORDER — POLYETHYLENE GLYCOL 3350 17 G/17G
17 POWDER, FOR SOLUTION ORAL
Refills: 0 | Status: DISCONTINUED | OUTPATIENT
Start: 2022-08-27 | End: 2022-08-27

## 2022-08-27 RX ORDER — HEPARIN SODIUM 5000 [USP'U]/ML
5000 INJECTION INTRAVENOUS; SUBCUTANEOUS EVERY 8 HOURS
Refills: 0 | Status: DISCONTINUED | OUTPATIENT
Start: 2022-08-27 | End: 2022-08-27

## 2022-08-27 RX ORDER — ACETAMINOPHEN 500 MG
650 TABLET ORAL ONCE
Refills: 0 | Status: COMPLETED | OUTPATIENT
Start: 2022-08-27 | End: 2022-08-27

## 2022-08-27 RX ORDER — POLYETHYLENE GLYCOL 3350 17 G/17G
17 POWDER, FOR SOLUTION ORAL
Refills: 0 | Status: DISCONTINUED | OUTPATIENT
Start: 2022-08-27 | End: 2022-08-30

## 2022-08-27 RX ORDER — HEPARIN SODIUM 5000 [USP'U]/ML
5000 INJECTION INTRAVENOUS; SUBCUTANEOUS EVERY 8 HOURS
Refills: 0 | Status: DISCONTINUED | OUTPATIENT
Start: 2022-08-27 | End: 2022-08-30

## 2022-08-27 RX ORDER — LANOLIN ALCOHOL/MO/W.PET/CERES
3 CREAM (GRAM) TOPICAL AT BEDTIME
Refills: 0 | Status: DISCONTINUED | OUTPATIENT
Start: 2022-08-27 | End: 2022-08-27

## 2022-08-27 RX ORDER — MAGNESIUM SULFATE 500 MG/ML
1 VIAL (ML) INJECTION ONCE
Refills: 0 | Status: COMPLETED | OUTPATIENT
Start: 2022-08-27 | End: 2022-08-27

## 2022-08-27 RX ORDER — METOPROLOL TARTRATE 50 MG
25 TABLET ORAL DAILY
Refills: 0 | Status: DISCONTINUED | OUTPATIENT
Start: 2022-08-28 | End: 2022-08-30

## 2022-08-27 RX ORDER — CALCIUM CARBONATE 500(1250)
1 TABLET ORAL ONCE
Refills: 0 | Status: COMPLETED | OUTPATIENT
Start: 2022-08-27 | End: 2022-08-27

## 2022-08-27 RX ADMIN — HEPARIN SODIUM 5000 UNIT(S): 5000 INJECTION INTRAVENOUS; SUBCUTANEOUS at 22:01

## 2022-08-27 RX ADMIN — Medication 25 MILLIGRAM(S): at 05:12

## 2022-08-27 RX ADMIN — Medication 650 MILLIGRAM(S): at 13:40

## 2022-08-27 RX ADMIN — Medication 5 MILLIGRAM(S): at 22:18

## 2022-08-27 RX ADMIN — CHLORHEXIDINE GLUCONATE 1 APPLICATION(S): 213 SOLUTION TOPICAL at 05:12

## 2022-08-27 RX ADMIN — Medication 650 MILLIGRAM(S): at 22:18

## 2022-08-27 RX ADMIN — Medication 100 GRAM(S): at 05:58

## 2022-08-27 RX ADMIN — HEPARIN SODIUM 5000 UNIT(S): 5000 INJECTION INTRAVENOUS; SUBCUTANEOUS at 16:27

## 2022-08-27 RX ADMIN — Medication 1 TABLET(S): at 12:38

## 2022-08-27 RX ADMIN — Medication 650 MILLIGRAM(S): at 12:38

## 2022-08-27 RX ADMIN — Medication 650 MILLIGRAM(S): at 23:18

## 2022-08-27 NOTE — PROGRESS NOTE ADULT - ASSESSMENT
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 May be peripartum cardiomyopathy causing conduction disease vs noncompaction cardiomyopathy  - Troponins negative  - Cath unremarkable with no evidence of SCAD  - TTE showing HFmEF (40-45%) with signs of non compaction  - EP followinng  - toprol    #Renal/  Asymptomatic bacteriuria in the setting of pregnancy on UA  - Per OBGYN, will hold off on antibiotics and wait on urine culture    Pregnancy  - OBGYN following    #GI  - Regular diet    #Endocrine  - F/u TSH, lipid, a1c    #Heme/onc  Normocytic anemia in the setting of pregnancy  - Cont to monitor  - iron studies    #ID  - no active issues. 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 May be peripartum cardiomyopathy causing conduction disease vs noncompaction cardiomyopathy  - Troponins negative  - Cath unremarkable with no evidence of SCAD  - TTE showing HFmEF (40-45%) with signs of non compaction  - EP followinng; cMRI non con on Monday ()  - toprol    #Renal/  Asymptomatic bacteriuria in the setting of pregnancy on UA  - Per OBGYN, will hold off on antibiotics and wait on urine culture    Pregnancy  - OBGYN following    #GI  - Regular diet    #Endocrine  - F/u TSH, lipid, a1c    #Heme/onc  Normocytic anemia in the setting of pregnancy  - Cont to monitor  - iron studies    #ID  - no active issues.

## 2022-08-27 NOTE — PROGRESS NOTE ADULT - SUBJECTIVE AND OBJECTIVE BOX
TAMMIE CRANE  MRN-[id number]  Patient is a 32y old  Female who presents with a chief complaint of Syncope and collapse (27 Aug 2022 16:30)    HPI:      Hospital Course:   Transferred to CCU for further management     24 HOUR EVENTS:    REVIEW OF SYSTEMS:    CONSTITUTIONAL: No weakness, fevers or chills  EYES/ENT: No visual changes;  No vertigo or throat pain   NECK: No pain or stiffness  RESPIRATORY: No cough, wheezing, hemoptysis; No shortness of breath  CARDIOVASCULAR: No chest pain or palpitations  GASTROINTESTINAL: No abdominal or epigastric pain. No nausea, vomiting, or hematemesis; No diarrhea or constipation. No melena or hematochezia.  GENITOURINARY: No dysuria, frequency or hematuria  NEUROLOGICAL: No numbness or weakness  SKIN: No itching, rashes      ICU Vital Signs Last 24 Hrs  T(C): 36.7 (27 Aug 2022 19:00), Max: 36.8 (27 Aug 2022 04:00)  T(F): 98.1 (27 Aug 2022 19:00), Max: 98.2 (27 Aug 2022 04:00)  HR: 86 (27 Aug 2022 20:00) (67 - 90)  BP: 124/98 (27 Aug 2022 20:00) (92/51 - 124/98)  BP(mean): 109 (27 Aug 2022 20:00) (65 - 109)  ABP: --  ABP(mean): --  RR: 15 (27 Aug 2022 20:00) (15 - 41)  SpO2: 99% (27 Aug 2022 20:00) (81% - 100%)    O2 Parameters below as of 27 Aug 2022 19:00  Patient On (Oxygen Delivery Method): room air            CVP(mm Hg): --  CO: --  CI: --  PA: --  PA(mean): --  PA(direct): --  PCWP: --  LA: --  RA: --  SVR: --  SVRI: --  PVR: --  PVRI: --  I&O's Summary    26 Aug 2022 07:01  -  27 Aug 2022 07:00  --------------------------------------------------------  IN: 580 mL / OUT: 800 mL / NET: -220 mL    27 Aug 2022 07:01  -  27 Aug 2022 20:36  --------------------------------------------------------  IN: 520 mL / OUT: 325 mL / NET: 195 mL        CAPILLARY BLOOD GLUCOSE    CAPILLARY BLOOD GLUCOSE          PHYSICAL EXAM:  GENERAL: No acute distress, well-developed  HEAD:  Atraumatic, Normocephalic  EYES: EOMI, PERRLA, conjunctiva and sclera clear  NECK: Supple, no lymphadenopathy, no JVD  CHEST/LUNG: CTAB; No wheezes, rales, or rhonchi  HEART: Regular rate and rhythm. Normal S1/S2. No murmurs, rubs, or gallops  ABDOMEN: Soft, non-tender, non-distended; normal bowel sounds, no organomegaly  EXTREMITIES:  2+ peripheral pulses b/l, No clubbing, cyanosis, or edema  NEUROLOGY: A&O x 3, no focal deficits  SKIN: No rashes or lesions    ============================I/O===========================   I&O's Detail    26 Aug 2022 07:01  -  27 Aug 2022 07:00  --------------------------------------------------------  IN:    IV PiggyBack: 100 mL    Oral Fluid: 480 mL  Total IN: 580 mL    OUT:    Voided (mL): 800 mL  Total OUT: 800 mL    Total NET: -220 mL      27 Aug 2022 07:  -  27 Aug 2022 20:36  --------------------------------------------------------  IN:    Oral Fluid: 520 mL  Total IN: 520 mL    OUT:    Voided (mL): 325 mL  Total OUT: 325 mL    Total NET: 195 mL        ============================ LABS =========================                        10.2   7.39  )-----------( 245      ( 27 Aug 2022 03:36 )             30.8         139  |  110<H>  |  11  ----------------------------<  79  4.0   |  20<L>  |  0.58    Ca    8.5      27 Aug 2022 03:36  Phos  3.4       Mg     1.9         TPro  5.5<L>  /  Alb  3.1<L>  /  TBili  0.8  /  DBili  x   /  AST  21  /  ALT  18  /  AlkPhos  34<L>      Troponin T, High Sensitivity Result: <6 ng/L (22 @ 10:56)  Troponin T, High Sensitivity Result: <6 ng/L (22 @ 06:50)  Troponin T, High Sensitivity Result: 7 ng/L (22 @ 22:43)  Troponin T, High Sensitivity Result: 12 ng/L (22 @ 18:04)              LIVER FUNCTIONS - ( 27 Aug 2022 03:36 )  Alb: 3.1 g/dL / Pro: 5.5 g/dL / ALK PHOS: 34 U/L / ALT: 18 U/L / AST: 21 U/L / GGT: x                   ======================Micro/Rad/Cardio=================  Telemtry: Reviewed   EKG: Reviewed  CXR: Reviewed  Culture: Reviewed   Echo: Transthoracic Echocardiogram:   Patient name: TAMMIE CRANE  YOB: 1990   Age: 32 (F)   MR#: [id number]  Study Date: 2022  Location: [place] [address]  DSonographer: Marcello Paul RDCS  Study quality: Technically fair  Referring Physician: Shilpa Smith MD  Blood Pressure: 109/79 mmHg  Height: 160 cm  Weight: 75 kg  BSA: 1.8 m2  Heart Rate: 87 mmHg  ------------------------------------------------------------------------  PROCEDURE: Transthoracic echocardiogram with 2-D, M-Mode  and complete spectral andcolor flow Doppler.  INDICATION: Chronic pulmonary embolism (I27.82)  ------------------------------------------------------------------------  Dimensions:    Normal Values:  LA:     3.0    2.0 - 4.0 cm  Ao:     2.3    2.0 - 3.8 cm  SEPTUM: 0.9    0.6 - 1.2 cm  PWT:    0.9    0.6 - 1.1 cm  LVIDd:  3.9    3.0 - 5.6 cm  LVIDs:  3.2    1.8 - 4.0 cm  Derived variables:  LVMI: 59 g/m2  RWT: 0.46  Fractional short: 18 %  EF (Visual Estimate): 40-45 %  Doppler Peak Velocity (m/sec): AoV=1.5  ------------------------------------------------------------------------  Conclusions:  1. Mitral annular calcification, otherwise normal mitral  valve. Minimal mitral regurgitation.  2. Normal trileaflet aortic valve. No aortic valve  regurgitation seen.  3. Mild-moderate global left ventricular systolic  dysfunction. There is increased trabeculation in the left  ventricular apex, suggestive of LVNC. Consider further  imaging with cMRI.  4. The right ventricle is not well visualized; grossly  normal right ventricular systolic function.  *** No previous Echo exam.  ------------------------------------------------------------------------  Confirmed on  2022 - 16:08:03 by Bereket Mckeon M.D.  ------------------------------------------------------------------------ (22 @ 10:41)    Cath:   ======================================================  PAST MEDICAL & SURGICAL HISTORY:  No pertinent past medical history      No significant past surgical history        ====================ASSESSMENT ==============  32 year old  @ 20+2 weeks (NERISSA: 23) presents to the ED after syncope and admitted to OBGYN service, found to have LBBB with chest pain w/ unremarkable cath but TTE showing HFmEF and possible non compaction                 Plan:  ====================== NEUROLOGY=====================  Syncope; convulsive syncope possible vasovagal vs v arrythmia  - cont to monitor    ==================== RESPIRATORY======================  - no active issues    Mechanical Ventilation:      ====================CARDIOVASCULAR==================  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 May be peripartum cardiomyopathy causing conduction disease vs noncompaction cardiomyopathy  - Troponins negative  - Cath unremarkable with no evidence of SCAD  - TTE showing HFmEF (40-45%) with signs of non compaction  - EP followinng; cMRI non con on Monday ()      ===================HEMATOLOGIC/ONC ===================  Normocytic anemia in the setting of pregnancy  - Cont to monitor  - iron studies  - Continue Heparin for venous thromboembolism prophylaxis.     heparin   Injectable 5000 Unit(s) SubCutaneous every 8 hours    ===================== RENAL =========================  Asymptomatic bacteriuria in the setting of pregnancy on UA  - Per OBGYN, will hold off on antibiotics and wait on urine culture    Pregnancy  - OBGYN following      ==================== GASTROINTESTINAL===================  - Regular diet  - Bowel regimen with Miralax.     polyethylene glycol 3350 17 Gram(s) Oral two times a day PRN Constipation  prenatal multivitamin 1 Tablet(s) Oral daily    =======================    ENDOCRINE  =====================  - F/u TSH, lipid, a1c    ========================INFECTIOUS DISEASE================  - no active issues.      Patient requires continuous monitoring with bedside rhythm monitoring, pulse ox monitoring, and intermittent blood gas analysis. Care plan discussed with ICU care team. Patient remained critical and at risk for life threatening decompensation.  Patient seen, examined and plan discussed with CCU team during rounds.     I have personally provided ____ minutes of critical care time excluding time spent on separate procedures, in addition to initial critical care time provided by the CICU Attending, Dr. Woods .     By signing my name below, I, Miky Castillo, attest that this documentation has been prepared under the direction and in the presence of Rosalie Muniz NP   Electronically signed: Grover Willis, 22 @ 20:36    I, Rosalie Muniz NP, personally performed the services described in this documentation. all medical record entries made by the scribe were at my direction and in my presence. I have reviewed the chart and agree that the record reflects my personal performance and is accurate and complete  Electronically signed: Rosalie Muniz NP

## 2022-08-27 NOTE — CONSULT NOTE ADULT - SUBJECTIVE AND OBJECTIVE BOX
CARDIOLOGY FELLOW HEART FAILURE CONSULT NOTE    Consulting service: CCU / OB  Reason for consult: Concern for non compaction cardiomyopathy in pregnant female    HPI:  Ms. Terrell is a 33 yo  F at 20 weeks with PMH of IBS who presents with syncope. Heart failure consulted for new HFrEF with c/f non compaction cardiomyopathy.    Patient reports she was sitting on the stairs on  after getting dizzy, followed by a syncopal episode, waking up on the floor at the bottom of the stairs. She notes that she has a history of dizzy spells that usually occur while overheated outside, described as an acute onset dizziness/room spinning sensation associated with palpitations diaphoresis and mild SOB. She notes these episodes occur once every 2-3 months but have always been outside and resolve with resting in a cool area, never associated with syncope. The episode on  was different in that she was inside her home with AC on and had sudden onset of symptoms, unlike her prior seemingly provoked episodes. Regarding the syncopal episode she endorses presyncopal symptoms with dizziness palpitations diaphoresis mild SOB and a "feeling like she was going to go down". She assumes that she was unconscious for about 30 seconds, and did not have overt head trauma. When she awoke she was mildly confused, but quickly realized what happened. She did not have any tongue biting urinary or fecal incontinence. She was not particularly lethargic and quickly notified her family what happened. She denies any recent FC NV infectious symptoms or sick contacts. Her only known medical problem is IBS with prior negative colonoscopies. She has no personal cardiac history of arrhythmias, FHX of SCD of HF, however she notes her mother recently had an MI in her 60s and someone in her family previously had a pacemaker. She denies any LE edema, orthopnea, PND, or VEGA.    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 Cardio OB consulted who recommended cardiac cath. Cardiac cath performed showing normal coronaries. TTE performed showing EF 40-45% with LV trabeculations c/f non compaction. CM. Patient labs grossly normal, troponin negative, proBNP WNL, clinically no e/o ADHF. Started on MTP Succ for GDMT. Course notable for Asx bacteriuria pending UCx. CCU and OB team recommending HF consult and cardiogenetics consult pending CMR results.    Patient currently in CCU feeling well with no complaints. States dizziness and palpitations have resolved since presenting to the hospital. Denies any HF sx as above. Understands that she has HFrEF and is pending MRI.     REVIEW OF SYSTEMS:  CONSTITUTIONAL: No weakness, fevers or chills  EYES/ENT: No visual changes;  No dysphagia  NECK: No pain or stiffness  RESPIRATORY: No cough, wheezing, hemoptysis; No shortness of breath  CARDIOVASCULAR: No chest pain or palpitations; No lower extremity edema  GASTROINTESTINAL: No abdominal or epigastric pain. No nausea, vomiting, or hematemesis; No diarrhea or constipation. No melena or hematochezia.  BACK: No back pain  GENITOURINARY: No dysuria, frequency or hematuria  NEUROLOGICAL: No numbness or weakness  SKIN: No itching, burning, rashes, or lesions   All other review of systems is negative unless indicated above.    Physical Exam:  T(F): 98.1 (-), Max: 98.2 (-)  HR: 86 (-) (67 - 90)  BP: 124/98 (-) (92/51 - 124/98)  RR: 15 (-)  SpO2: 99% (-)    GENERAL: No acute distress, well-developed  HEAD:  Atraumatic, Normocephalic  ENT: EOMI, PERRLA, conjunctiva and sclera clear, Neck supple, No JVD, moist mucosa  CHEST/LUNG: Clear to auscultation bilaterally; No wheeze, equal breath sounds bilaterally   BACK: No spinal tenderness  HEART: Regular rate and rhythm; No murmurs, rubs, or gallops  ABDOMEN: Soft, gravid abdomen, Nontender, Bowel sounds present  EXTREMITIES:  No clubbing, cyanosis, or edema  PSYCH: Nl behavior, nl affect  NEUROLOGY: AAOx3, non-focal, cranial nerves intact  SKIN: Normal color, No rashes or lesions    ECG: NSR LBBB HR 83    Echo  Conclusions:  1. Mitral annular calcification, otherwise normal mitral  valve. Minimal mitral regurgitation.  2. Normal trileaflet aortic valve. No aortic valve  regurgitation seen.  3. Mild-moderate global left ventricular systolic  dysfunction. There is increased trabeculation in the left  ventricular apex, suggestive of LVNC. Consider further  imaging with cMRI.  4. The right ventricle is not well visualized; grossly  normal right ventricular systolic function.    Cath:   Diagnostic Conclusions:   No obstructive CAD     CXR: Personally reviewed    Labs: Personally reviewed                        10.2   7.39  )-----------( 245      ( 27 Aug 2022 03:36 )             30.8         139  |  110<H>  |  11  ----------------------------<  79  4.0   |  20<L>  |  0.58    Ca    8.5      27 Aug 2022 03:36  Phos  3.4       Mg     1.9         TPro  5.5<L>  /  Alb  3.1<L>  /  TBili  0.8  /  DBili  x   /  AST  21  /  ALT  18  /  AlkPhos  34<L>      CARDIAC MARKERS ( 26 Aug 2022 10:56 )  <6 ng/L / x     / x     / x     / x     / x      CARDIAC MARKERS ( 26 Aug 2022 06:50 )  <6 ng/L / x     / x     / x     / x     / x      CARDIAC MARKERS ( 25 Aug 2022 22:43 )  7 ng/L / x     / x     / x     / x     / x      CARDIAC MARKERS ( 25 Aug 2022 18:04 )  12 ng/L / x     / x     / x     / x     / x        Serum Pro-Brain Natriuretic Peptide: 78 pg/mL ( @ 18:04)    Thyroid Stimulating Hormone, Serum: 2.66 uIU/mL ( @ 18:02)    A/P  33 yo  F at 20 weeks with PMH of IBS who presents with syncope. Heart failure consulted for new HFrEF with c/f non compaction cardiomyopathy.    #HFrEF  #NICM  #C/f Non Compaction Cardiomyopathy vs Peripartum Cardiomyopathy  #LBBB  - 32 F with hx of dizzy spells presenting after syncopal episode   - EKG NSR LBBB neg sgarbossa criteria  - Cardiac Cath neg for CAD or SCAD  - TTE notable for HFrEF 40-45% and possible LV non compaction  - TSH 2.66 Trop neg x4 proBNP 78  - Clinically euvolemic no e/o ADHF, ASx  - AFebrile no infectious sx   - DDx: HFrEF etiology unclear at this time, non compaction highest on the differential based on TTE findings however will need to confirm with CMR (non casandra). Peripartum cardiomyopathy also a possibility but would be more of a diagnosis of exclusion at this standpoint. The LBBB may be related to the etiology of the HF, however cath negative for SCAD/CAD thus less c/f ischemic etiology. Familial CM also possible based on hx of PPM in her aunt, however further work up needed. No episodes of VT noted on tele but arrhythmia induced CM also a possibility  Plan:  - Continue GDMT     > MTP Succ 25 QDay    > Hold off on ACE/ARB/ARNI and MRA iso pregnancy    > No data on SGLT2I, would hold off ass well  - F/U CMR  - No clear indication for RHC at this time, can consider EMBx if CMR does not provide diagnosis   - Consider re-consulting cardio OB and cardio genetics pending results of the study  - Consider event monitor vs loop recorder for palpitations and dizziness episodes  - Lasix 20 PO PRN for LE edema or orthopnea    Thank you for the consult, HF team to follow     Signed by:  Gianluca Rhodes PGY4  Cardiology Fellow    [facility]    CARDIOLOGY FELLOW HEART FAILURE CONSULT NOTE    Consulting service: CCU / OB  Reason for consult: Concern for non compaction cardiomyopathy in pregnant female    HPI:  Ms. Terrell is a 33 yo  F at 20 weeks with PMH of IBS who presents with syncope. Heart failure consulted for new HFrEF with c/f non compaction cardiomyopathy.    Patient reports she was sitting on the stairs on  after getting dizzy, followed by a syncopal episode, waking up on the floor at the bottom of the stairs. She notes that she has a history of dizzy spells that usually occur while overheated outside, described as an acute onset dizziness/room spinning sensation associated with palpitations diaphoresis and mild SOB. She notes these episodes occur once every 2-3 months but have always been outside and resolve with resting in a cool area, never associated with syncope. The episode on  was different in that she was inside her home with AC on and had sudden onset of symptoms, unlike her prior seemingly provoked episodes. Regarding the syncopal episode she endorses presyncopal symptoms with dizziness palpitations diaphoresis mild SOB and a "feeling like she was going to go down". She assumes that she was unconscious for about 30 seconds, and did not have overt head trauma. When she awoke she was mildly confused, but quickly realized what happened. She did not have any tongue biting urinary or fecal incontinence. She was not particularly lethargic and quickly notified her family what happened. She denies any recent FC NV infectious symptoms or sick contacts. Her only known medical problem is IBS with prior negative colonoscopies. She has no personal cardiac history of arrhythmias, FHX of SCD of HF, however she notes her mother recently had an MI in her 60s and someone in her family previously had a pacemaker. She denies any LE edema, orthopnea, PND, or VEGA.    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 Cardio OB consulted who recommended cardiac cath. Cardiac cath performed showing normal coronaries. TTE performed showing EF 40-45% with LV trabeculations c/f non compaction. CM. Patient labs grossly normal, troponin negative, proBNP WNL, clinically no e/o ADHF. Started on MTP Succ for GDMT. Course notable for Asx bacteriuria pending UCx. CCU and OB team recommending HF consult and cardiogenetics consult pending CMR results.    Patient currently in CCU feeling well with no complaints. States dizziness and palpitations have resolved since presenting to the hospital. Denies any HF sx as above. Understands that she has HFrEF and is pending MRI.     REVIEW OF SYSTEMS:  CONSTITUTIONAL: No weakness, fevers or chills  EYES/ENT: No visual changes;  No dysphagia  NECK: No pain or stiffness  RESPIRATORY: No cough, wheezing, hemoptysis; No shortness of breath  CARDIOVASCULAR: No chest pain or palpitations; No lower extremity edema  GASTROINTESTINAL: No abdominal or epigastric pain. No nausea, vomiting, or hematemesis; No diarrhea or constipation. No melena or hematochezia.  BACK: No back pain  GENITOURINARY: No dysuria, frequency or hematuria  NEUROLOGICAL: No numbness or weakness  SKIN: No itching, burning, rashes, or lesions   All other review of systems is negative unless indicated above.    Physical Exam:  T(F): 98.1 (-), Max: 98.2 (-)  HR: 86 (-) (67 - 90)  BP: 124/98 (-) (92/51 - 124/98)  RR: 15 (-)  SpO2: 99% (-)    GENERAL: No acute distress, well-developed  HEAD:  Atraumatic, Normocephalic  ENT: EOMI, PERRLA, conjunctiva and sclera clear, Neck supple, No JVD, moist mucosa  CHEST/LUNG: Clear to auscultation bilaterally; No wheeze, equal breath sounds bilaterally   BACK: No spinal tenderness  HEART: Regular rate and rhythm; No murmurs, rubs, or gallops  ABDOMEN: Soft, gravid abdomen, Nontender, Bowel sounds present  EXTREMITIES:  No clubbing, cyanosis, or edema  PSYCH: Nl behavior, nl affect  NEUROLOGY: AAOx3, non-focal, cranial nerves intact  SKIN: Normal color, No rashes or lesions    ECG: NSR LBBB HR 83    Echo  Conclusions:  1. Mitral annular calcification, otherwise normal mitral  valve. Minimal mitral regurgitation.  2. Normal trileaflet aortic valve. No aortic valve  regurgitation seen.  3. Mild-moderate global left ventricular systolic  dysfunction. There is increased trabeculation in the left  ventricular apex, suggestive of LVNC. Consider further  imaging with cMRI.  4. The right ventricle is not well visualized; grossly  normal right ventricular systolic function.    Cath:   Diagnostic Conclusions:   No obstructive CAD     CXR: Personally reviewed    Labs: Personally reviewed                        10.2   7.39  )-----------( 245      ( 27 Aug 2022 03:36 )             30.8         139  |  110<H>  |  11  ----------------------------<  79  4.0   |  20<L>  |  0.58    Ca    8.5      27 Aug 2022 03:36  Phos  3.4       Mg     1.9         TPro  5.5<L>  /  Alb  3.1<L>  /  TBili  0.8  /  DBili  x   /  AST  21  /  ALT  18  /  AlkPhos  34<L>      CARDIAC MARKERS ( 26 Aug 2022 10:56 )  <6 ng/L / x     / x     / x     / x     / x      CARDIAC MARKERS ( 26 Aug 2022 06:50 )  <6 ng/L / x     / x     / x     / x     / x      CARDIAC MARKERS ( 25 Aug 2022 22:43 )  7 ng/L / x     / x     / x     / x     / x      CARDIAC MARKERS ( 25 Aug 2022 18:04 )  12 ng/L / x     / x     / x     / x     / x        Serum Pro-Brain Natriuretic Peptide: 78 pg/mL ( @ 18:04)    Thyroid Stimulating Hormone, Serum: 2.66 uIU/mL ( @ 18:02)    A/P  33 yo  F at 20 weeks with PMH of IBS who presents with syncope. Heart failure consulted for new HFrEF with c/f non compaction cardiomyopathy.    #HFrEF  #NICM  #C/f Non Compaction Cardiomyopathy   #LBBB  - 32 F with hx of dizzy spells presenting after syncopal episode   - EKG NSR LBBB neg sgarbossa criteria  - Cardiac Cath neg for CAD or SCAD  - TTE notable for HFrEF 40-45% and possible LV non compaction  - TSH 2.66 Trop neg x4 proBNP 78  - Clinically euvolemic no e/o ADHF, ASx  - AFebrile no infectious sx   - DDx: HFrEF etiology unclear at this time, non compaction highest on the differential based on TTE findings however will need to confirm with CMR (non casandra). Peripartum cardiomyopathy also a possibility but would be more of a diagnosis of exclusion at this standpoint. The LBBB may be related to the etiology of the HF, however cath negative for SCAD/CAD thus less c/f ischemic etiology. Familial CM also possible based on hx of PPM in her aunt, however further work up needed. No episodes of VT noted on tele but arrhythmia induced CM also a possibility  Plan:  - Continue GDMT     > MTP Succ 25 QDay    > Hold off on ACE/ARB/ARNI and MRA iso pregnancy    > No data on SGLT2I, would hold off ass well  - F/U CMR  - No clear indication for RHC at this time, can consider EMBx if CMR does not provide diagnosis   - Consider re-consulting cardio OB and cardio genetics pending results of the study  - Consider event monitor vs loop recorder for palpitations and dizziness episodes  - Lasix 20 PO PRN for LE edema or orthopnea    Thank you for the consult, HF team to follow     Signed by:  Gianluca Rhodes PGY4  Cardiology Fellow    [facility]    CARDIOLOGY FELLOW HEART FAILURE CONSULT NOTE    Consulting service: CCU / OB  Reason for consult: Concern for non compaction cardiomyopathy in pregnant female    HPI:  Ms. Terrell is a 33 yo  F at 20 weeks with PMH of IBS who presents with syncope. Heart failure consulted for new HFrEF with c/f non compaction cardiomyopathy.    Patient reports she was sitting on the stairs on  after getting dizzy, followed by a syncopal episode, waking up on the floor at the bottom of the stairs. She notes that she has a history of dizzy spells that usually occur while overheated outside, described as an acute onset dizziness/room spinning sensation associated with palpitations diaphoresis and mild SOB. She notes these episodes occur once every 2-3 months but have always been outside and resolve with resting in a cool area, never associated with syncope. The episode on  was different in that she was inside her home with AC on and had sudden onset of symptoms, unlike her prior seemingly provoked episodes. Regarding the syncopal episode she endorses presyncopal symptoms with dizziness palpitations diaphoresis mild SOB and a "feeling like she was going to go down". She assumes that she was unconscious for about 30 seconds, and did not have overt head trauma. When she awoke she was mildly confused, but quickly realized what happened. She did not have any tongue biting urinary or fecal incontinence. She was not particularly lethargic and quickly notified her family what happened. She denies any recent FC NV infectious symptoms or sick contacts. Her only known medical problem is IBS with prior negative colonoscopies. She has no personal cardiac history of arrhythmias, FHX of SCD of HF, however she notes her mother recently had an MI in her 60s and someone in her family previously had a pacemaker. She denies any LE edema, orthopnea, PND, or VEGA.    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 Cardio OB consulted who recommended cardiac cath. Cardiac cath performed showing normal coronaries. TTE performed showing EF 40-45% with LV trabeculations c/f non compaction. CM. Patient labs grossly normal, troponin negative, proBNP WNL, clinically no e/o ADHF. Started on MTP Succ for GDMT. Course notable for Asx bacteriuria pending UCx. CCU and OB team recommending HF consult and cardiogenetics consult pending CMR results.    Patient currently in CCU feeling well with no complaints. States dizziness and palpitations have resolved since presenting to the hospital. Denies any HF sx as above. Understands that she has HFrEF and is pending MRI.     REVIEW OF SYSTEMS:  CONSTITUTIONAL: No weakness, fevers or chills  EYES/ENT: No visual changes;  No dysphagia  NECK: No pain or stiffness  RESPIRATORY: No cough, wheezing, hemoptysis; No shortness of breath  CARDIOVASCULAR: No chest pain or palpitations; No lower extremity edema  GASTROINTESTINAL: No abdominal or epigastric pain. No nausea, vomiting, or hematemesis; No diarrhea or constipation. No melena or hematochezia.  BACK: No back pain  GENITOURINARY: No dysuria, frequency or hematuria  NEUROLOGICAL: No numbness or weakness  SKIN: No itching, burning, rashes, or lesions   All other review of systems is negative unless indicated above.    Physical Exam:  T(F): 98.1 (-), Max: 98.2 (-)  HR: 86 (-) (67 - 90)  BP: 124/98 (-) (92/51 - 124/98)  RR: 15 (-)  SpO2: 99% (-)    GENERAL: No acute distress, well-developed  HEAD:  Atraumatic, Normocephalic  ENT: EOMI, PERRLA, conjunctiva and sclera clear, Neck supple, No JVD, moist mucosa  CHEST/LUNG: Clear to auscultation bilaterally; No wheeze, equal breath sounds bilaterally   BACK: No spinal tenderness  HEART: Regular rate and rhythm; No murmurs, rubs, or gallops  ABDOMEN: Soft, gravid abdomen, Nontender, Bowel sounds present  EXTREMITIES:  No clubbing, cyanosis, or edema  PSYCH: Nl behavior, nl affect  NEUROLOGY: AAOx3, non-focal, cranial nerves intact  SKIN: Normal color, No rashes or lesions    ECG: NSR LBBB HR 83    Echo  Conclusions:  1. Mitral annular calcification, otherwise normal mitral  valve. Minimal mitral regurgitation.  2. Normal trileaflet aortic valve. No aortic valve  regurgitation seen.  3. Mild-moderate global left ventricular systolic  dysfunction. There is increased trabeculation in the left  ventricular apex, suggestive of LVNC. Consider further  imaging with cMRI.  4. The right ventricle is not well visualized; grossly  normal right ventricular systolic function.    Cath:   Diagnostic Conclusions:   No obstructive CAD     CXR: Personally reviewed    Labs: Personally reviewed                        10.2   7.39  )-----------( 245      ( 27 Aug 2022 03:36 )             30.8         139  |  110<H>  |  11  ----------------------------<  79  4.0   |  20<L>  |  0.58    Ca    8.5      27 Aug 2022 03:36  Phos  3.4       Mg     1.9         TPro  5.5<L>  /  Alb  3.1<L>  /  TBili  0.8  /  DBili  x   /  AST  21  /  ALT  18  /  AlkPhos  34<L>      CARDIAC MARKERS ( 26 Aug 2022 10:56 )  <6 ng/L / x     / x     / x     / x     / x      CARDIAC MARKERS ( 26 Aug 2022 06:50 )  <6 ng/L / x     / x     / x     / x     / x      CARDIAC MARKERS ( 25 Aug 2022 22:43 )  7 ng/L / x     / x     / x     / x     / x      CARDIAC MARKERS ( 25 Aug 2022 18:04 )  12 ng/L / x     / x     / x     / x     / x        Serum Pro-Brain Natriuretic Peptide: 78 pg/mL ( @ 18:04)    Thyroid Stimulating Hormone, Serum: 2.66 uIU/mL ( @ 18:02)    A/P  33 yo  F at 20 weeks with PMH of IBS who presents with syncope. Heart failure consulted for new HFrEF with c/f non compaction cardiomyopathy.    #HFrEF  #NICM  #C/f Non Compaction Cardiomyopathy   #LBBB  - 32 F with hx of dizzy spells presenting after syncopal episode   - EKG NSR LBBB neg sgarbossa criteria  - Cardiac Cath neg for CAD or SCAD  - TTE notable for HFrEF 40-45% and possible LV non compaction  - TSH 2.66 Trop neg x4 proBNP 78  - Clinically euvolemic no e/o ADHF, ASx  - AFebrile no infectious sx   - DDx: HFrEF etiology unclear at this time, non compaction highest on the differential based on TTE findings however will need to confirm with CMR (non casandra). Peripartum cardiomyopathy also a possibility but would be more of a diagnosis of exclusion at this standpoint. The LBBB may be related to the etiology of the HF, however cath negative for SCAD/CAD thus less c/f ischemic etiology. Familial CM also possible based on hx of PPM in her aunt, however further work up needed. No episodes of VT noted on tele but arrhythmia induced CM also a possibility  Plan:  - Continue GDMT     > MTP Succ 25 QDay    > Hold off on ACE/ARB/ARNI and MRA iso pregnancy    > No data on SGLT2I in pregnancy, would hold off ass well  - F/U CMR  - No clear indication for RHC at this time, can consider EMBx if CMR does not provide diagnosis   - Consider re-consulting cardio OB and cardio genetics pending results of the study  - Consider event monitor vs loop recorder for palpitations and dizziness episodes  - Lasix 20 PO PRN for LE edema or orthopnea    Thank you for the consult, HF team to follow     Signed by:  Gianluca Rhodes PGY4  Cardiology Fellow    [facility]

## 2022-08-27 NOTE — PROGRESS NOTE ADULT - ASSESSMENT
32 year old  @ 20+1 weeks (NERISSA: 23) with no medical history who presented to the ED 22 s/p a fall at 3pm. Patient states that at this time she was walking to the top of the stairs when she suddenly began to feel lower abdominal pain, dizzy and like the room was spinning. She felt like she might syncopize so she tried to sit down, and then awoke at the bottom of the stairs. She was found to have new LBBB. Echo with an EF 40-45%, concern for LVNC.    1. Syncope likely vasovagal   2. LBBB  3. EF 40-45%, concern for LVNC    - New LBBB and CP s/p negative cath for CAD/SCAD  - Echo with EF 40-45% and concern for LVNC  - Obtain cMRI no contrast (as patient is pregnant)  - Continue close telemetry monitoring  - continue Toprol XL 25 mg daily  - Genetic testing with Dr. Mckeon to evaluate for inherited Cardiomyopathy   - Maintain K>4.0 and Mg>2.0   - Discussed with CICU team    KARMA Valles [facility]  [phone]

## 2022-08-27 NOTE — PROGRESS NOTE ADULT - NS ATTEND AMEND GEN_ALL_CORE FT
Likely vasovagal syncope, but abnormal echo and ECG  Await MRI  Genetic testing  Continue metoprolol

## 2022-08-27 NOTE — PROGRESS NOTE ADULT - ASSESSMENT
33yo  @ 20w3d admitted to the antepartum service s/p a fall vas synchopal episode now found to have HFmEF and LVNC. Patient had a fall on 22 at 3pm where she fellt lightheaded and subsequently lost conscious and regained consciousness after falling down 14 carpeted steps. After the fall the patient complained of constant lower back pain as well intermittent lower pelvic pain and was ruled out for PTL. Patient is now s/p ***.     #HFmEF  - LBBB on initial EKG   - TTE: EF 40-45% with signs suggestive of LVNC  - Troponins wnl  - Appreciate cardiology recs     #Maternal well being  - Rho nico administration  - Regular diet  - PNVs  - FH qD    Mvula PGY3 33yo  @ 20w3d admitted to the antepartum service s/p a fall vas synchopal episode now found to have HFmEF and LVNC. Patient had a fall on 22 at 3pm where she fellt lightheaded and subsequently lost conscious and regained consciousness after falling down 14 carpeted steps. After the fall the patient complained of constant lower back pain as well intermittent lower pelvic pain and was ruled out for PTL. Patient is now s/p neg cardiac cath but with TTE showing HFmEF and LVNC. Patient currently in stable condition, VSS, asymptomatic this AM.     #HFmEF  - LBBB on initial EKG   - Cardiac cath neg  - TTE: EF 40-45% with signs suggestive of LVNC  - Troponins wnl  - Appreciate cardiology recs: con;t Metorpolol 25mg qD     #Maternal well being  - Rho nico administration  - Regular diet  - PNVs  - FH qD    Mvula PGY3 33yo  @ 20w3d admitted to the antepartum service s/p a fall vas synchopal episode now found to have HFmEF and LVNC. Patient had a fall on 22 at 3pm where she fellt lightheaded and subsequently lost conscious and regained consciousness after falling down 14 carpeted steps. After the fall the patient complained of constant lower back pain as well intermittent lower pelvic pain and was ruled out for PTL. Patient is now s/p neg cardiac cath but with TTE showing HFmEF and LVNC. Patient currently in stable condition, VSS, asymptomatic this AM.     #HFmEF  - LBBB on initial EKG   - Cardiac cath neg  - TTE: EF 40-45% with signs suggestive of LVNC  - Troponins wnl  - Appreciate cardiology recs: con;t Metorpolol 25mg qD     #Maternal well being  - Rho nico administration  - Regular diet  - PNVs  - FH qD    Mvula PGY3    MFM FELLOW / SVC ATTENDING ADDENDUM:   33 yo  at 20w3d presenting with a syncopal episode, found with LBBB on ECG. Cath with no findings, though TTE concerning for heart failure with reduced EF, and trabeculations suspicious for non compaction cardiomyopathy. Definitive diagnosis pending MRI, scheduled for Mon. Extensive discussion with CICU team regarding diagnosis and findings. 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 Patient with some family history on her paternal side (aunt and uncle with pace makers vs defibrillators), with possibility of inherited disorder. Genomics team to follow patient as well, to assess possible genetic etiology to her condition. Briefly discussed possibility of fetal inheritance should this be true, and also discussed the option of termination, though currently unsure to what extend maternal condition is/ will be impacted by pregnancy, given definitive diagnosis not yet determined. Patient understands and is not interested in pursuing termination in any scenario. Will obtain fetal MRI. Also briefly discussed the potential need for premature delivery pending maternal status later on during this pregnancy.     - Consulting services: CICU, Cardio OB , heart failure, EP, Genomics   - For MRI on Mon - discussed possible need for contrast with CICU and Cardio OB team, and both are in agreement that this is not needed for diagnosis at this time.   - For inpatient vs outpatient fetal echo  - Currently on prophylactic anticoagulation with Lovenox 40mg   - Metoprolol 25 mg per CICU   - Continue multidisciplinary discussion     YC DOMITILA Frey Fellow / Svc attending   Seen and d/w DOMITILA Muniz Attending

## 2022-08-27 NOTE — CONSULT NOTE ADULT - ATTENDING COMMENTS
Patient was seen and examined with the fellow.    Briefly, Ms. Terrell is a 33 yo  F at 20 weeks who presents with syncope.  She has been having episode of lightheadedness in the setting of hot days often when not drinking enough.  She will experience flushing, fatigue, and lightheadedness during this episodes. These on average have occurred a handful of times since summer 2021.  However, this time this occurred after her was sitting for a long period of time and then began walking to the stairs.  She sat at the top of the stair however, did pass out.  No injuries sustained.  While in hospital, EKG reveal LBBB with no arrhythmias have been identified on monitor.  Echocardiogram revealed LVEF mildly reduced (40-45%) with increased LV trabeculations concerning for non compaction cardiomyopathy.  She denies experiencing any leg swelling, abdominal swelling, or orthopnea.  She reports increased dyspnea on exertion since the start of her pregnancy. From reports, she did not present this time with ADHF.  With the timing of her CM, she does not fit the typical criteria for peripartum cardiomyopathy which would involve onset in the last month of pregnancy up to 5 months post pregnancy.  LHC negative for CAD or SCAD.  She denies every having a prolonged illness as a child although both she and her  had COVID in 2021.  She had symptoms of sore throat and fatigue that resolved in a few days without therapy.  She did not require hospitalization.  Agree with cMRI however, evaluation will be limited given the inability to give gadolinium due to her pregnancy.  We will still be able to evaluation LV myocardium architecture and assess for non-compaction.      On exam today, she appears euvolemic.  GDMT is limited as noted above due to pregnancy.  ACEi/ARB/MRA/SGLT2i are prohibited.  Can continue with metoprolol succinate 25mg XL daily.  Does not need diuretics based on exam today.  I also counselled her that after pregnancy will need to discuss about starting GDMT if needed and perhaps needing to eliminate breast feeding. Based on cMRI, may need to decide about anticoagulation for non-compaction.  Her symptoms of lightheadedness and syncope, sound like vasovagal syncope however, ruling out additional arrhythmias with long term monitoring is warranted.  EP team involved with plan for loop recorder implant. I spoke with her extensively about fluid restriction in HF.  She typically will drink very little each day (~250cc).  Especially during pregnancy she will need to stay hydrated and take in at least 1L fluid daily ( but no more than 2).  She can have transient compression of her IVC as pregnancy continues due to fetal positioning.  She should sit at the edge of the bed for 2 min prior to standing and get up slowly standing in place before walking.

## 2022-08-27 NOTE — CONSULT NOTE ADULT - TIME BILLING
Time spent at bedside interviewing and examining the patient, reviewing the chart and telemetry, and coordinating care with the primary team.  I counselled patient on the heart failure disease process and the ongoing medical therapy.

## 2022-08-27 NOTE — PROGRESS NOTE ADULT - SUBJECTIVE AND OBJECTIVE BOX
Events:    Review Of Systems:  Constitutional: denies fever, chills, Fatigue   HEENT: denies Blurred vision, Eye Pain, Headache   Respiratory: denies Cough, Wheezing , Shortness of breath  Cardiovascular: denies Chest Pain, Palpitations,  VEGA   Gastrointestinal: denies Abdominal Pain, Diarrhea, Constipation   Genitourinary: denies Nocturia, Dysuria, Incontinence  Extremities: denies Swelling, Joint Pain  Neurologic: denies Focal deficit, Paresthesias, Syncope  Lymphatic: denies Swelling, Lymphadenopathy   Skin: denies Rash, Ecchymoses, Wounds   Psychiatry: denies Depression, Suicidal/Homicidal Ideation, anxiety  [X ] 10 point review of systems is otherwise negative except as mentioned above         Medications:  chlorhexidine 4% Liquid 1 Application(s) Topical <User Schedule>  metoprolol succinate ER 25 milliGRAM(s) Oral daily  prenatal multivitamin 1 Tablet(s) Oral daily    PMH/PSH/FH/SH: [ ] Unchanged  Vitals:  T(C): 36.7 (08-27-22 @ 00:00), Max: 37.1 (08-26-22 @ 11:50)  HR: 72 (08-27-22 @ 04:00) (72 - 116)  BP: 97/51 (08-27-22 @ 04:00) (92/51 - 147/88)  BP(mean): 68 (08-27-22 @ 04:00) (68 - 112)  RR: 15 (08-27-22 @ 04:00) (15 - 31)  SpO2: 99% (08-27-22 @ 04:00) (88% - 100%)  Wt(kg): --  Daily Height in cm: 160.02 (26 Aug 2022 11:50)    Daily   I&O's Summary    25 Aug 2022 07:01  -  26 Aug 2022 07:00  --------------------------------------------------------  IN: 0 mL / OUT: 500 mL / NET: -500 mL    26 Aug 2022 07:01  -  27 Aug 2022 04:16  --------------------------------------------------------  IN: 360 mL / OUT: 600 mL / NET: -240 mL        Physical Exam:  Appearance: [ ] Normal [ ] NAD  Eyes: [ ] PERRL [ ] EOMI  HENT: [ ] Normal oral muscosa [ ]NC/AT  Cardiovascular: [ ] S1 [ ] S2 [ ] RRR [ ] No m/r/g [ ]No edema [ ] JVP  Procedural Access Site: [ ] No hematoma [ ] Non-tender to palpation [ ] 2+ pulse [ ] No bruit [ ] No Ecchymosis  Respiratory: [ ] Clear to auscultation bilaterally  Gastrointestinal: [ ] Soft [ ] Non-tender [ ] Non-distended [ ] BS+  Musculoskeletal: [ ] No clubbing [ ] No joint deformity   Neurologic: [ ] Non-focal  Lymphatic: [ ] No lymphadenopathy  Psychiatry: [ ] AAOx3 [ ] Mood & affect appropriate  Skin: [ ] No rashes [ ] No ecchymoses [ ] No cyanosis    08-27    139  |  110<H>  |  11  ----------------------------<  79  4.0   |  20<L>  |  0.58    Ca    8.5      27 Aug 2022 03:36  Phos  3.4     08-27  Mg     1.9     08-27    TPro  5.5<L>  /  Alb  3.1<L>  /  TBili  0.8  /  DBili  x   /  AST  21  /  ALT  18  /  AlkPhos  34<L>  08-27    PT/INR - ( 25 Aug 2022 18:03 )   PT: 10.7 sec;   INR: 0.92 ratio         PTT - ( 25 Aug 2022 18:03 )  PTT:25.8 sec      Serum Pro-Brain Natriuretic Peptide: 78 pg/mL (08-25 @ 18:04)          ECG:    Echo:    Stress Testing:     Cath:    Imaging:    Interpretation of Telemetry:      Assessment:       Neuro/Psych:    Cardiovascular    Lines:    Pulmonary    Gastrointestinal    Genitourinary    Renal    ID    Hematological    Endocrine                  HPI: 32 year old  @ 20+1 weeks (NERISSA: 23) presents to the ED s/p a fall at 3pm.Of note patient states that she had these presyncopal feelings multiple times in the past year that have intensified during pregnancy. Of note patient denies ever losing consciousness in the past. Patient states that these feelings are normally precipitated by warm temperatures or standing for prolonged periods of time. Patient denies any family history of anyone suffering from episodes akin to this. At time of OBGYN evaluation in the ED, patient still endorsed lightheadedness and dizziness and stated that she did not think she would be able to get out of bed without a syncopal event. Pt found to have a LBBB on EKG and went to the cath lab and found to have clean cors.     Events: No acute events on telemetry.     Review Of Systems:  Constitutional: denies fever, chills, Fatigue   HEENT: denies Blurred vision, Eye Pain, Headache   Respiratory: denies Cough, Wheezing , Shortness of breath  Cardiovascular: denies Chest Pain, Palpitations,  VEGA   Gastrointestinal: denies Abdominal Pain, Diarrhea, Constipation   Genitourinary: denies Nocturia, Dysuria, Incontinence  Extremities: denies Swelling, Joint Pain  Neurologic: denies Focal deficit, Paresthesias, Syncope  Lymphatic: denies Swelling, Lymphadenopathy   Skin: denies Rash, Ecchymoses, Wounds   Psychiatry: denies Depression, Suicidal/Homicidal Ideation, anxiety  [X ] 10 point review of systems is otherwise negative except as mentioned above         Medications:  chlorhexidine 4% Liquid 1 Application(s) Topical <User Schedule>  metoprolol succinate ER 25 milliGRAM(s) Oral daily  prenatal multivitamin 1 Tablet(s) Oral daily    Vitals:  T(C): 36.7 (22 @ 00:00), Max: 37.1 (22 @ 11:50)  HR: 72 (22 @ 04:00) (72 - 116)  BP: 97/51 (22 @ 04:00) (92/51 - 147/88)  BP(mean): 68 (22 @ 04:00) (68 - 112)  RR: 15 (22 @ 04:00) (15 - 31)  SpO2: 99% (22 @ 04:00) (88% - 100%)    Daily Height in cm: 160.02 (26 Aug 2022 11:50)    Daily   I&O's Summary    25 Aug 2022 07:  -  26 Aug 2022 07:00  --------------------------------------------------------  IN: 0 mL / OUT: 500 mL / NET: -500 mL    26 Aug 2022 07:01  -  27 Aug 2022 04:16  --------------------------------------------------------  IN: 360 mL / OUT: 600 mL / NET: -240 mL    Physical Exam:  Appearance: [X ] Normal [ X] NAD  Eyes: [ X] PERRL [X ] EOMI  HENT: [X ] Normal oral muscosa [ X]NC/AT  Cardiovascular: [X ] S1 [ X] S2 [X ] RRR No edema, No JVD  Procedural Access Site: [ ] No hematoma [ ] Non-tender to palpation [ ] 2+ pulse [ ] No bruit [ ] No Ecchymosis  Respiratory: [ X] Clear to auscultation bilaterally  Gastrointestinal: [X ] Soft [X ] Non-tender [ X] Non-distended   Musculoskeletal: X[ ] No clubbing [X ] No joint deformity   Neurologic: [X ] Non-focal  Lymphatic: [X ] No lymphadenopathy  Psychiatry: [ X] AAOx3 [ X] Mood & affect appropriate  Skin: [ X] No rashes X[ ] No ecchymoses [X ] No cyanosis        139  |  110<H>  |  11  ----------------------------<  79  4.0   |  20<L>  |  0.58    Ca    8.5      27 Aug 2022 03:36  Phos  3.4       Mg     1.9         TPro  5.5<L>  /  Alb  3.1<L>  /  TBili  0.8  /  DBili  x   /  AST  21  /  ALT  18  /  AlkPhos  34<L>  08-    PT/INR - ( 25 Aug 2022 18:03 )   PT: 10.7 sec;   INR: 0.92 ratio      PTT - ( 25 Aug 2022 18:03 )  PTT:25.8 sec    Serum Pro-Brain Natriuretic Peptide: 78 pg/mL ( @ 18:04)    ECG: < from: 12 Lead ECG (22 @ 05:21) >  Diagnosis Line NORMAL SINUS RHYTHM  LEFT BUNDLE BRANCH BLOCK    Echo: < from: Transthoracic Echocardiogram (22 @ 10:41) >  1. Mitral annular calcification, otherwise normal mitral  valve. Minimal mitral regurgitation.  2. Normal trileaflet aortic valve. No aortic valve  regurgitation seen.  3. Mild-moderate global left ventricular systolic  dysfunction. There is increased trabeculation in the left  ventricular apex, suggestive of LVNC. Consider further  imaging with cMRI.  4. The right ventricle is not well visualized; grossly  normal right ventricular systolic function.    Interpretation of Telemetry: NSR 77      Assessment:   31 yo F 20 weeks pregnant who presented w syncopal episode found to have LBBB w concern for LVNC      Neuro/Psych:  - A and O x 3, tylenol for back pain  - Ambulate this AM    Cardiovascular  - LBBB , clean cors  - Cardiac MRI on Monday  - NO s/s of HF, consider adding GDMT    Pulmonary  - NO shortness of breath , appears euvolemic     Gastrointestinal  - Continue reg diet    Genitourinary  - Voiding without complaints    Renal  - CR and electrolytes stable    ID  - UA reviewed , no UTI symptoms     Hematological  - HGB stable , STDs for DVT px    Endocrine   - Glucose WNL    Pt stable for tx to telemetry unit  Rosalie Muniz, DNP

## 2022-08-27 NOTE — PROGRESS NOTE ADULT - SUBJECTIVE AND OBJECTIVE BOX
HPI:  Patient seen and examined at bedside on rounds in CICU.    Review Of Systems:           Respiratory: No shortness of breath, cough, or wheezing  Cardiovascular: No chest pain or palpitations  10 point review of systems is otherwise negative except as mentioned above        Medications:  heparin   Injectable 5000 Unit(s) SubCutaneous every 8 hours  polyethylene glycol 3350 17 Gram(s) Oral two times a day PRN  prenatal multivitamin 1 Tablet(s) Oral daily    PAST MEDICAL & SURGICAL HISTORY:  No pertinent past medical history      No significant past surgical history        Vitals:  T(C): 36.8 (08-27-22 @ 08:00), Max: 36.8 (08-26-22 @ 20:00)  HR: 82 (08-27-22 @ 16:09) (67 - 104)  BP: 109/55 (08-27-22 @ 16:09) (92/51 - 123/72)  BP(mean): 79 (08-27-22 @ 16:09) (65 - 92)  RR: 16 (08-27-22 @ 16:09) (15 - 41)  SpO2: 100% (08-27-22 @ 14:00) (81% - 100%)  Wt(kg): --  Daily     Daily   I&O's Summary    26 Aug 2022 07:01  -  27 Aug 2022 07:00  --------------------------------------------------------  IN: 580 mL / OUT: 800 mL / NET: -220 mL    27 Aug 2022 07:01  -  27 Aug 2022 16:31  --------------------------------------------------------  IN: 520 mL / OUT: 0 mL / NET: 520 mL        Physical Exam:  Appearance: No acute distress; well appearing  Eyes: PERRL, EOMI, pink conjunctiva  HENT: Normal oral mucosa  Cardiovascular: RRR, S1, S2, no murmurs, rubs, or gallops; no edema; no JVD  Respiratory: Clear to auscultation bilaterally  Gastrointestinal: soft, non-tender, non-distended with normal bowel sounds  Musculoskeletal: No clubbing; no joint deformity   Neurologic: Non-focal  Lymphatic: No lymphadenopathy  Psychiatry: AAOx3, mood & affect appropriate  Skin: No rashes, ecchymoses, or cyanosis                          10.2   7.39  )-----------( 245      ( 27 Aug 2022 03:36 )             30.8     08-27    139  |  110<H>  |  11  ----------------------------<  79  4.0   |  20<L>  |  0.58    Ca    8.5      27 Aug 2022 03:36  Phos  3.4     08-27  Mg     1.9     08-27    TPro  5.5<L>  /  Alb  3.1<L>  /  TBili  0.8  /  DBili  x   /  AST  21  /  ALT  18  /  AlkPhos  34<L>  08-27    PT/INR - ( 25 Aug 2022 18:03 )   PT: 10.7 sec;   INR: 0.92 ratio         PTT - ( 25 Aug 2022 18:03 )  PTT:25.8 sec      Serum Pro-Brain Natriuretic Peptide: 78 pg/mL (08-25 @ 18:04)          ECG: sinus, LBBB    Echo:  < from: Transthoracic Echocardiogram (08.26.22 @ 10:41) >  ------------------------------------------------------------------------  Dimensions:    Normal Values:  LA:     3.0    2.0 - 4.0 cm  Ao:     2.3    2.0 - 3.8 cm  SEPTUM: 0.9    0.6 - 1.2 cm  PWT:    0.9    0.6 - 1.1 cm  LVIDd:  3.9    3.0 - 5.6 cm  LVIDs:  3.2    1.8 - 4.0 cm  Derived variables:  LVMI: 59 g/m2  RWT: 0.46  Fractional short: 18 %  EF (Visual Estimate): 40-45 %  Doppler Peak Velocity (m/sec): AoV=1.5  ------------------------------------------------------------------------  Observations:  Mitral Valve: Mitral annular calcification, otherwise  normal mitral valve. Minimal mitral regurgitation.  Aortic Valve/Aorta: Normal trileaflet aortic valve. Peak  transaortic valve gradient equals 9 mm Hg, estimated aortic  valve area equals 2.3 sqcm. No aortic valve regurgitation  seen. Peak left ventricular outflow tract gradient equals 6  mm Hg, mean gradient is equal to 4 mm Hg.  Aortic Root: 2.3 cm.  LVOT diameter: 1.9 cm.  Left Atrium: LA volume index = 15 cc/m2.  Left Ventricle: Mild-moderate global left ventricular  systolic dysfunction. There is increased trabeculation in  the left ventricular apex, suggestive of LVNC. Consider  further imaging with cMRI.Normal left ventricular  internal dimensions and wall thicknesses. Mild diastolic  dysfunction (Stage I).  Right Heart: Normal right atrium. The right ventricle is  not well visualized; grossly normal right ventricular  systolic function. Normal tricuspid valve. Minimal  tricuspid regurgitation. Normal pulmonic valve. No pulmonic  regurgitation.  Pericardium/Pleura: Normal pericardium with no pericardial  effusion.  ------------------------------------------------------------------------  Conclusions:  1. Mitral annular calcification, otherwise normal mitral  valve. Minimal mitral regurgitation.  2. Normal trileaflet aortic valve. No aortic valve  regurgitation seen.  3. Mild-moderate global left ventricular systolic  dysfunction. There is increased trabeculation in the left  ventricular apex, suggestive of LVNC. Consider further  imaging with cMRI.  4. The right ventricle is not well visualized; grossly  normal right ventricular systolic function.  *** No previous Echo exam.  ------------------------------------------------------------------------  Confirmed on  8/26/2022 - 16:08:03 by Bereket Mckeon M.D.  ------------------------------------------------------------------------    < end of copied text >      Stress Testing:     Cath:  < from: Cardiac Catheterization (08.26.22 @ 13:02) >  Cath Lab Report    Diagnostic Cardiologist:       Caryn Nation MD   Fellow:                        Amanda Gomes MD   Referring Physician:           Kavon Rey MD     Procedures Performed   Procedures:              1.    Arterial Access - Right Radial     2.    Diagnostic Coronary Angiography   3.    Left Heart Cath   4.    Arterial Access - Right Femoral     Indications:               Chest pain   Lab Visit Indication:      chest pain   PCI Status:                elective     Diagnostic Conclusions:  No obstructive CAD     Recommendations:     1. Etiology of chest pain is not due to obstructice CAD or SCAD. Admit  to CCU for further monitoring and work-up. Echo  pending. Discussed with Dr. Woods.      Hemodynamic:           Hemodynamic Impressions     General Impressions: LVEDP is normal (7mmHg).       < end of copied text >    Imaging:    Interpretation of Telemetry: NSR

## 2022-08-27 NOTE — PROGRESS NOTE ADULT - SUBJECTIVE AND OBJECTIVE BOX
R3 OB Antepartum Progress Note    Patient seen and examined at bedside, no acute overnight events. No acute complaints. Patient endorses good fetal movement. Patient is ambulating and tolerating regular diet. Denies CP, SOB, N/V, fevers, chills, or any other concerns.    Vital Signs Last 24 Hours  T(C): 36.7 (08-27-22 @ 00:00), Max: 37.1 (08-26-22 @ 11:50)  HR: 72 (08-27-22 @ 04:00) (72 - 116)  BP: 97/51 (08-27-22 @ 04:00) (92/51 - 147/88)  RR: 15 (08-27-22 @ 04:00) (15 - 31)  SpO2: 99% (08-27-22 @ 04:00) (88% - 100%)    I&O's Summary    25 Aug 2022 07:01  -  26 Aug 2022 07:00  --------------------------------------------------------  IN: 0 mL / OUT: 500 mL / NET: -500 mL    26 Aug 2022 07:01  -  27 Aug 2022 05:28  --------------------------------------------------------  IN: 360 mL / OUT: 600 mL / NET: -240 mL        Physical Exam:  General: NAD  CV: RR  Lungs: breathing comfortably on RA  Abdomen: soft, gravid, non-tender  Ext: no pain or swelling    Labs:             10.2<L>  7.39  )-----------( 245      ( 08-27 @ 03:36 )             30.8<L>               11.4<L>  9.53  )-----------( 364      ( 08-25 @ 18:02 )             33.4<L>        MEDICATIONS  (STANDING):  chlorhexidine 4% Liquid 1 Application(s) Topical <User Schedule>  metoprolol succinate ER 25 milliGRAM(s) Oral daily  prenatal multivitamin 1 Tablet(s) Oral daily    MEDICATIONS  (PRN):   R3 OB Antepartum Progress Note    Patient seen and examined at bedside, no acute overnight events. No acute complaints. Patient endorses good fetal movement. Patient is ambulating and tolerating regular diet. Denies CP, SOB, N/V, fevers, chills, or any other concerns.    Vital Signs Last 24 Hours  T(C): 36.7 (08-27-22 @ 00:00), Max: 37.1 (08-26-22 @ 11:50)  HR: 72 (08-27-22 @ 04:00) (72 - 116)  BP: 97/51 (08-27-22 @ 04:00) (92/51 - 147/88)  RR: 15 (08-27-22 @ 04:00) (15 - 31)  SpO2: 99% (08-27-22 @ 04:00) (88% - 100%)    I&O's Summary  25 Aug 2022 07:01  -  26 Aug 2022 07:00  --------------------------------------------------------  IN: 0 mL / OUT: 500 mL / NET: -500 mL    26 Aug 2022 07:01  -  27 Aug 2022 05:28  --------------------------------------------------------  IN: 360 mL / OUT: 600 mL / NET: -240 mL      Physical Exam:  General: NAD  CV: RR  Lungs: breathing comfortably on RA  Abdomen: soft, gravid, non-tender  Ext: no pain or swelling    Labs:             10.2<L>  7.39  )-----------( 245      ( 08-27 @ 03:36 )             30.8<L>               11.4<L>  9.53  )-----------( 364      ( 08-25 @ 18:02 )             33.4<L>        MEDICATIONS  (STANDING):  chlorhexidine 4% Liquid 1 Application(s) Topical <User Schedule>  metoprolol succinate ER 25 milliGRAM(s) Oral daily  prenatal multivitamin 1 Tablet(s) Oral daily

## 2022-08-27 NOTE — PROGRESS NOTE ADULT - SUBJECTIVE AND OBJECTIVE BOX
24H hour events: No over night events. Denies c/o CP, palpitations or SOB.     MEDICATIONS:  heparin   Injectable 5000 Unit(s) SubCutaneous every 8 hours    polyethylene glycol 3350 17 Gram(s) Oral two times a day PRN    prenatal multivitamin 1 Tablet(s) Oral daily    REVIEW OF SYSTEMS:  Complete 10point ROS negative.    PHYSICAL EXAM:  T(C): 36.8 (08-27-22 @ 04:00), Max: 36.9 (08-26-22 @ 14:22)  HR: 90 (08-27-22 @ 13:00) (67 - 116)  BP: 117/58 (08-27-22 @ 13:00) (92/51 - 147/88)  RR: 23 (08-27-22 @ 13:00) (15 - 41)  SpO2: 84% (08-27-22 @ 10:00) (81% - 100%)    26 Aug 2022 07:01  -  27 Aug 2022 07:00  --------------------------------------------------------  IN: 580 mL / OUT: 800 mL / NET: -220 mL    27 Aug 2022 07:01  -  27 Aug 2022 13:56  --------------------------------------------------------  IN: 150 mL / OUT: 0 mL / NET: 150 mL    Appearance: Normal	  Cardiovascular: Normal S1 S2, regular. No JVD, No murmurs, No edema  Respiratory: Lungs clear to auscultation	  Psychiatry: A & O x 3, Mood & affect appropriate  Extremities: Normal range of motion, No clubbing, cyanosis or edema  Vascular: Peripheral pulses palpable 2+ bilaterally      LABS:	 	    CBC Full  -  ( 27 Aug 2022 03:36 )  WBC Count : 7.39 K/uL  Hemoglobin : 10.2 g/dL  Hematocrit : 30.8 %  Platelet Count - Automated : 245 K/uL  Mean Cell Volume : 93.1 fl  Mean Cell Hemoglobin : 30.8 pg  Mean Cell Hemoglobin Concentration : 33.1 gm/dL  Auto Neutrophil # : 5.23 K/uL  Auto Lymphocyte # : 1.38 K/uL  Auto Monocyte # : 0.65 K/uL  Auto Eosinophil # : 0.07 K/uL  Auto Basophil # : 0.03 K/uL  Auto Neutrophil % : 70.8 %  Auto Lymphocyte % : 18.7 %  Auto Monocyte % : 8.8 %  Auto Eosinophil % : 0.9 %  Auto Basophil % : 0.4 %    08-27    139  |  110<H>  |  11  ----------------------------<  79  4.0   |  20<L>  |  0.58  08-25    137  |  104  |  10  ----------------------------<  89  3.8   |  23  |  0.56    Ca    8.5      27 Aug 2022 03:36  Ca    8.5      25 Aug 2022 18:04  Phos  3.4     08-27  Mg     1.9     08-27    TPro  5.5<L>  /  Alb  3.1<L>  /  TBili  0.8  /  DBili  x   /  AST  21  /  ALT  18  /  AlkPhos  34<L>  08-27  TPro  6.1  /  Alb  3.6  /  TBili  0.8  /  DBili  x   /  AST  21  /  ALT  16  /  AlkPhos  36<L>  08-25      proBNP: Serum Pro-Brain Natriuretic Peptide: 78 pg/mL (08-25 @ 18:04)      TELEMETRY: 	  NSR 58-80's with intermittent PAC

## 2022-08-27 NOTE — PROGRESS NOTE ADULT - SUBJECTIVE AND OBJECTIVE BOX
Patient is a 32y old  Female who presents with a chief complaint of syncope (26 Aug 2022 10:52)    HPI:       INTERVAL HPI/OVERNIGHT EVENTS:   No overnight events   Afebrile, hemodynamically stable     Subjective:    ICU Vital Signs Last 24 Hrs  T(C): 36.8 (27 Aug 2022 04:00), Max: 37.1 (26 Aug 2022 11:50)  T(F): 98.2 (27 Aug 2022 04:00), Max: 98.7 (26 Aug 2022 11:50)  HR: 70 (27 Aug 2022 07:01) (67 - 116)  BP: 101/56 (27 Aug 2022 07:01) (92/51 - 147/88)  BP(mean): 74 (27 Aug 2022 07:01) (65 - 112)  ABP: --  ABP(mean): --  RR: 16 (27 Aug 2022 07:01) (15 - 31)  SpO2: 100% (27 Aug 2022 07:01) (88% - 100%)    O2 Parameters below as of 27 Aug 2022 06:00  Patient On (Oxygen Delivery Method): room air          I&O's Summary    26 Aug 2022 07:01  -  27 Aug 2022 07:00  --------------------------------------------------------  IN: 580 mL / OUT: 800 mL / NET: -220 mL          Daily Height in cm: 160.02 (26 Aug 2022 11:50)    Daily     Adult Advanced Hemodynamics Last 24 Hrs  CVP(mm Hg): --  CVP(cm H2O): --  CO: --  CI: --  PA: --  PA(mean): --  PCWP: --  SVR: --  SVRI: --  PVR: --  PVRI: --    EKG/Telemetry Events:    MEDICATIONS  (STANDING):  chlorhexidine 4% Liquid 1 Application(s) Topical <User Schedule>  metoprolol succinate ER 25 milliGRAM(s) Oral daily  prenatal multivitamin 1 Tablet(s) Oral daily    MEDICATIONS  (PRN):      PHYSICAL EXAM:  GENERAL:   HEAD:  Atraumatic, Normocephalic  EYES: EOMI, PERRLA, conjunctiva and sclera clear  NECK: Supple, No JVD, Normal thyroid, no enlarged nodes  NERVOUS SYSTEM:  Alert & Awake.   CHEST/LUNG: B/L good air entry; No rales, rhonchi, or wheezing  HEART: S1S2 normal, no S3, Regular rate and rhythm; No murmurs  ABDOMEN: Soft, Nontender, Nondistended; Bowel sounds present  EXTREMITIES:  2+ Peripheral Pulses, No clubbing, cyanosis, or edema  LYMPH: No lymphadenopathy noted  SKIN: No rashes or lesions    LABS:                        10.2   7.39  )-----------( 245      ( 27 Aug 2022 03:36 )             30.8     08-27    139  |  110<H>  |  11  ----------------------------<  79  4.0   |  20<L>  |  0.58    Ca    8.5      27 Aug 2022 03:36  Phos  3.4     08-27  Mg     1.9     08-27    TPro  5.5<L>  /  Alb  3.1<L>  /  TBili  0.8  /  DBili  x   /  AST  21  /  ALT  18  /  AlkPhos  34<L>  08-27    LIVER FUNCTIONS - ( 27 Aug 2022 03:36 )  Alb: 3.1 g/dL / Pro: 5.5 g/dL / ALK PHOS: 34 U/L / ALT: 18 U/L / AST: 21 U/L / GGT: x           PT/INR - ( 25 Aug 2022 18:03 )   PT: 10.7 sec;   INR: 0.92 ratio         PTT - ( 25 Aug 2022 18:03 )  PTT:25.8 sec  CAPILLARY BLOOD GLUCOSE                Urinalysis Basic - ( 25 Aug 2022 19:53 )    Color: Light Yellow / Appearance: Slightly Turbid / S.013 / pH: x  Gluc: x / Ketone: Trace  / Bili: Negative / Urobili: Negative   Blood: x / Protein: Trace / Nitrite: Negative   Leuk Esterase: Moderate / RBC: 3-5 /hpf / WBC 7 /HPF   Sq Epi: x / Non Sq Epi: 4 /hpf / Bacteria: Occasional          RADIOLOGY & ADDITIONAL TESTS:  CXR:        Care Discussed with Consultants/Other Providers [ x] YES  [ ] NO           Patient is a 32y old  Female who presents with a chief complaint of syncope (26 Aug 2022 10:52)         INTERVAL HPI/OVERNIGHT EVENTS:   No overnight events   Afebrile, hemodynamically stable     Subjective: Patient doing well. Minor lower back pain but no neurological symptoms. No chest pain, palpitations, dizziness, b/l LE edema.    ICU Vital Signs Last 24 Hrs  T(C): 36.8 (27 Aug 2022 04:00), Max: 37.1 (26 Aug 2022 11:50)  T(F): 98.2 (27 Aug 2022 04:00), Max: 98.7 (26 Aug 2022 11:50)  HR: 70 (27 Aug 2022 07:01) (67 - 116)  BP: 101/56 (27 Aug 2022 07:01) (92/51 - 147/88)  BP(mean): 74 (27 Aug 2022 07:01) (65 - 112)  ABP: --  ABP(mean): --  RR: 16 (27 Aug 2022 07:01) (15 - 31)  SpO2: 100% (27 Aug 2022 07:01) (88% - 100%)    O2 Parameters below as of 27 Aug 2022 06:00  Patient On (Oxygen Delivery Method): room air          I&O's Summary    26 Aug 2022 07:01  -  27 Aug 2022 07:00  --------------------------------------------------------  IN: 580 mL / OUT: 800 mL / NET: -220 mL          Daily Height in cm: 160.02 (26 Aug 2022 11:50)    Daily     Adult Advanced Hemodynamics Last 24 Hrs  CVP(mm Hg): --  CVP(cm H2O): --  CO: --  CI: --  PA: --  PA(mean): --  PCWP: --  SVR: --  SVRI: --  PVR: --  PVRI: --    EKG/Telemetry Events:    MEDICATIONS  (STANDING):  chlorhexidine 4% Liquid 1 Application(s) Topical <User Schedule>  metoprolol succinate ER 25 milliGRAM(s) Oral daily  prenatal multivitamin 1 Tablet(s) Oral daily    MEDICATIONS  (PRN):      PHYSICAL EXAM:  GENERAL:   HEAD:  Atraumatic, Normocephalic  EYES: EOMI, PERRLA, conjunctiva and sclera clear  NECK: Supple, No JVD, Normal thyroid, no enlarged nodes  NERVOUS SYSTEM:  Alert & Awake.   CHEST/LUNG: B/L good air entry; No rales, rhonchi, or wheezing  HEART: S1S2 normal, no S3, Regular rate and rhythm; No murmurs  ABDOMEN: gravid abdomen  EXTREMITIES:  2+ Peripheral Pulses, No clubbing, cyanosis, or edema  LYMPH: No lymphadenopathy noted  SKIN: No rashes or lesions    LABS:                        10.2   7.39  )-----------( 245      ( 27 Aug 2022 03:36 )             30.8         139  |  110<H>  |  11  ----------------------------<  79  4.0   |  20<L>  |  0.58    Ca    8.5      27 Aug 2022 03:36  Phos  3.4       Mg     1.9         TPro  5.5<L>  /  Alb  3.1<L>  /  TBili  0.8  /  DBili  x   /  AST  21  /  ALT  18  /  AlkPhos  34<L>      LIVER FUNCTIONS - ( 27 Aug 2022 03:36 )  Alb: 3.1 g/dL / Pro: 5.5 g/dL / ALK PHOS: 34 U/L / ALT: 18 U/L / AST: 21 U/L / GGT: x           PT/INR - ( 25 Aug 2022 18:03 )   PT: 10.7 sec;   INR: 0.92 ratio         PTT - ( 25 Aug 2022 18:03 )  PTT:25.8 sec  CAPILLARY BLOOD GLUCOSE                Urinalysis Basic - ( 25 Aug 2022 19:53 )    Color: Light Yellow / Appearance: Slightly Turbid / S.013 / pH: x  Gluc: x / Ketone: Trace  / Bili: Negative / Urobili: Negative   Blood: x / Protein: Trace / Nitrite: Negative   Leuk Esterase: Moderate / RBC: 3-5 /hpf / WBC 7 /HPF   Sq Epi: x / Non Sq Epi: 4 /hpf / Bacteria: Occasional          RADIOLOGY & ADDITIONAL TESTS:  CXR:        Care Discussed with Consultants/Other Providers [ x] YES  [ ] NO

## 2022-08-28 LAB
ALBUMIN SERPL ELPH-MCNC: 3.5 G/DL — SIGNIFICANT CHANGE UP (ref 3.3–5)
ALP SERPL-CCNC: 40 U/L — SIGNIFICANT CHANGE UP (ref 40–120)
ALT FLD-CCNC: 20 U/L — SIGNIFICANT CHANGE UP (ref 10–45)
ANION GAP SERPL CALC-SCNC: 11 MMOL/L — SIGNIFICANT CHANGE UP (ref 5–17)
AST SERPL-CCNC: 22 U/L — SIGNIFICANT CHANGE UP (ref 10–40)
BILIRUB SERPL-MCNC: 0.8 MG/DL — SIGNIFICANT CHANGE UP (ref 0.2–1.2)
BUN SERPL-MCNC: 9 MG/DL — SIGNIFICANT CHANGE UP (ref 7–23)
CALCIUM SERPL-MCNC: 9 MG/DL — SIGNIFICANT CHANGE UP (ref 8.4–10.5)
CHLORIDE SERPL-SCNC: 107 MMOL/L — SIGNIFICANT CHANGE UP (ref 96–108)
CO2 SERPL-SCNC: 22 MMOL/L — SIGNIFICANT CHANGE UP (ref 22–31)
CREAT SERPL-MCNC: 0.56 MG/DL — SIGNIFICANT CHANGE UP (ref 0.5–1.3)
CULTURE RESULTS: SIGNIFICANT CHANGE UP
EGFR: 126 ML/MIN/1.73M2 — SIGNIFICANT CHANGE UP
GLUCOSE SERPL-MCNC: 82 MG/DL — SIGNIFICANT CHANGE UP (ref 70–99)
HCT VFR BLD CALC: 32.5 % — LOW (ref 34.5–45)
HGB BLD-MCNC: 11.1 G/DL — LOW (ref 11.5–15.5)
MAGNESIUM SERPL-MCNC: 2 MG/DL — SIGNIFICANT CHANGE UP (ref 1.6–2.6)
MCHC RBC-ENTMCNC: 31.2 PG — SIGNIFICANT CHANGE UP (ref 27–34)
MCHC RBC-ENTMCNC: 34.2 GM/DL — SIGNIFICANT CHANGE UP (ref 32–36)
MCV RBC AUTO: 91.3 FL — SIGNIFICANT CHANGE UP (ref 80–100)
NRBC # BLD: 0 /100 WBCS — SIGNIFICANT CHANGE UP (ref 0–0)
PHOSPHATE SERPL-MCNC: 3.5 MG/DL — SIGNIFICANT CHANGE UP (ref 2.5–4.5)
PLATELET # BLD AUTO: 266 K/UL — SIGNIFICANT CHANGE UP (ref 150–400)
POTASSIUM SERPL-MCNC: 3.6 MMOL/L — SIGNIFICANT CHANGE UP (ref 3.5–5.3)
POTASSIUM SERPL-SCNC: 3.6 MMOL/L — SIGNIFICANT CHANGE UP (ref 3.5–5.3)
PROT SERPL-MCNC: 5.9 G/DL — LOW (ref 6–8.3)
RBC # BLD: 3.56 M/UL — LOW (ref 3.8–5.2)
RBC # FLD: 13.7 % — SIGNIFICANT CHANGE UP (ref 10.3–14.5)
SODIUM SERPL-SCNC: 140 MMOL/L — SIGNIFICANT CHANGE UP (ref 135–145)
SPECIMEN SOURCE: SIGNIFICANT CHANGE UP
WBC # BLD: 7.42 K/UL — SIGNIFICANT CHANGE UP (ref 3.8–10.5)
WBC # FLD AUTO: 7.42 K/UL — SIGNIFICANT CHANGE UP (ref 3.8–10.5)

## 2022-08-28 PROCEDURE — 99291 CRITICAL CARE FIRST HOUR: CPT

## 2022-08-28 PROCEDURE — 99232 SBSQ HOSP IP/OBS MODERATE 35: CPT

## 2022-08-28 PROCEDURE — 99223 1ST HOSP IP/OBS HIGH 75: CPT | Mod: GC

## 2022-08-28 PROCEDURE — 93010 ELECTROCARDIOGRAM REPORT: CPT

## 2022-08-28 PROCEDURE — 99292 CRITICAL CARE ADDL 30 MIN: CPT | Mod: 25

## 2022-08-28 PROCEDURE — 99292 CRITICAL CARE ADDL 30 MIN: CPT

## 2022-08-28 RX ADMIN — HEPARIN SODIUM 5000 UNIT(S): 5000 INJECTION INTRAVENOUS; SUBCUTANEOUS at 14:00

## 2022-08-28 RX ADMIN — HEPARIN SODIUM 5000 UNIT(S): 5000 INJECTION INTRAVENOUS; SUBCUTANEOUS at 21:18

## 2022-08-28 RX ADMIN — Medication 1 TABLET(S): at 12:33

## 2022-08-28 RX ADMIN — HEPARIN SODIUM 5000 UNIT(S): 5000 INJECTION INTRAVENOUS; SUBCUTANEOUS at 05:25

## 2022-08-28 RX ADMIN — Medication 25 MILLIGRAM(S): at 05:25

## 2022-08-28 RX ADMIN — Medication 5 MILLIGRAM(S): at 17:53

## 2022-08-28 NOTE — PROGRESS NOTE ADULT - ASSESSMENT
31yo  @ 20w4d admitted to the antepartum service s/p a fall vas synchopal episode now found to have HFmEF and LVNC. Patient had a fall on 22 at 3pm where she fellt lightheaded and subsequently lost conscious and regained consciousness after falling down 14 carpeted steps. After the fall the patient complained of constant lower back pain as well intermittent lower pelvic pain and was ruled out for PTL. Patient is now s/p neg cardiac cath but with TTE showing HFmEF and LVNC. Patient currently in stable condition, VSS, asymptomatic this AM.     #HFmEF  - LBBB on initial EKG   - Cardiac cath neg  - TTE: EF 40-45% with signs suggestive of LVNC  - Troponins wnl  - Tele monitoring   - Appreciate cardiology recs: con;t Metorpolol 25mg qD  - plan for cardiac MR w/o Gadolinium on      #Maternal well being  - Rhogam administrated  - Regular diet  - PNVs  - HSQ for DTV prophylaxis   - FH prn    Liberty Perez PGY3 33yo  @ 20w4d admitted to the antepartum service s/p a fall vas synchopal episode now found to have HFmEF and LVNC. Patient had a fall on 22 at 3pm where she fellt lightheaded and subsequently lost conscious and regained consciousness after falling down 14 carpeted steps. After the fall the patient complained of constant lower back pain as well intermittent lower pelvic pain and was ruled out for PTL. Patient is now s/p neg cardiac cath but with TTE showing HFmEF and LVNC. Patient currently in stable condition, VSS, asymptomatic this AM.     #HFmEF  - LBBB on initial EKG   - Cardiac cath neg  - TTE: EF 40-45% with signs suggestive of LVNC  - Troponins wnl  - Tele monitoring   - Appreciate cardiology recs: con;t Metorpolol 25mg qD  - plan for cardiac MR w/o Gadolinium on      #Maternal well being  - Rhogam administrated  - Regular diet  - PNVs  - Miralax/senna prn   - HSQ for DTV prophylaxis   - FH prn    Liberty Perez PGY3

## 2022-08-28 NOTE — PROGRESS NOTE ADULT - SUBJECTIVE AND OBJECTIVE BOX
Patient went to [place] to take care of granddaughter and forgot her pills can we send 7 days worth to a pharmacy there R3 Antepartum Note, HD#3    Patient seen and examined at bedside, no acute overnight events. No acute complaints. Pt reports +FM, denies LOF, VB, ctx, HA, epigastric pain, blurred vision, CP, SOB, N/V, fevers, and chills.    Vital Signs Last 24 Hours  T(C): 36.7 (08-27-22 @ 23:00), Max: 36.8 (08-27-22 @ 04:00)  HR: 83 (08-28-22 @ 02:00) (67 - 96)  BP: 105/53 (08-28-22 @ 02:00) (96/51 - 124/98)  RR: 16 (08-28-22 @ 02:00) (15 - 41)  SpO2: 97% (08-28-22 @ 02:00) (81% - 100%)    CAPILLARY BLOOD GLUCOSE          Physical Exam:  General: NAD  Abdomen: Soft, non-tender, gravid  Ext: No pain or swelling    Labs:             11.1   7.42  )-----------( 266      ( 08-28 @ 03:08 )             32.5     08-27 @ 03:36    139  |  110  |  11  ----------------------------<  79  4.0   |  20  |  0.58    Ca    8.5      08-27 @ 03:36  Phos  3.4     08-27 @ 03:36  Mg     1.9     08-27 @ 03:36    TPro  5.5  /  Alb  3.1  /  TBili  0.8  /  DBili  x   /  AST  21  /  ALT  18  /  AlkPhos  34  08-27 @ 03:36    PT/INR - ( 08-25 @ 18:03 )   PT: 10.7 sec;   INR: 0.92 ratio    PTT - ( 08-25 @ 18:03 )  PTT:25.8 sec    Uric Acid: (08-25 @ 22:43)  --       Fibrinogen: (08-25 @ 22:43)  558      LDH: (08-25 @ 22:43)  --         MEDICATIONS  (STANDING):  bisacodyl 5 milliGRAM(s) Oral at bedtime  calcium carbonate    500 mG (Tums) Chewable 1 Tablet(s) Chew once  heparin   Injectable 5000 Unit(s) SubCutaneous every 8 hours  metoprolol succinate ER 25 milliGRAM(s) Oral daily  prenatal multivitamin 1 Tablet(s) Oral daily    MEDICATIONS  (PRN):  polyethylene glycol 3350 17 Gram(s) Oral two times a day PRN Constipation       R3 Antepartum Note, HD#3    Patient seen and examined at bedside, no acute overnight events. She reports continued constipation today with 3 attempts to have a bowel movement with no success. Pt reports +FM, denies LOF, VB, ctx, HA, epigastric pain, blurred vision, CP, SOB, N/V, fevers, and chills.    Vital Signs Last 24 Hours  T(C): 36.7 (08-27-22 @ 23:00), Max: 36.8 (08-27-22 @ 04:00)  HR: 83 (08-28-22 @ 02:00) (67 - 96)  BP: 105/53 (08-28-22 @ 02:00) (96/51 - 124/98)  RR: 16 (08-28-22 @ 02:00) (15 - 41)  SpO2: 97% (08-28-22 @ 02:00) (81% - 100%)    CAPILLARY BLOOD GLUCOSE          Physical Exam:  General: NAD  Abdomen: Soft, non-tender, gravid  Ext: No pain or swelling    Labs:             11.1   7.42  )-----------( 266      ( 08-28 @ 03:08 )             32.5     08-27 @ 03:36    139  |  110  |  11  ----------------------------<  79  4.0   |  20  |  0.58    Ca    8.5      08-27 @ 03:36  Phos  3.4     08-27 @ 03:36  Mg     1.9     08-27 @ 03:36    TPro  5.5  /  Alb  3.1  /  TBili  0.8  /  DBili  x   /  AST  21  /  ALT  18  /  AlkPhos  34  08-27 @ 03:36    PT/INR - ( 08-25 @ 18:03 )   PT: 10.7 sec;   INR: 0.92 ratio    PTT - ( 08-25 @ 18:03 )  PTT:25.8 sec    Uric Acid: (08-25 @ 22:43)  --       Fibrinogen: (08-25 @ 22:43)  558      LDH: (08-25 @ 22:43)  --         MEDICATIONS  (STANDING):  bisacodyl 5 milliGRAM(s) Oral at bedtime  calcium carbonate    500 mG (Tums) Chewable 1 Tablet(s) Chew once  heparin   Injectable 5000 Unit(s) SubCutaneous every 8 hours  metoprolol succinate ER 25 milliGRAM(s) Oral daily  prenatal multivitamin 1 Tablet(s) Oral daily    MEDICATIONS  (PRN):  polyethylene glycol 3350 17 Gram(s) Oral two times a day PRN Constipation

## 2022-08-28 NOTE — PROGRESS NOTE ADULT - SUBJECTIVE AND OBJECTIVE BOX
TAMMIE CRANE  MRN-[id number]  Patient is a 32y old  Female who presents with a chief complaint of Syncope and collapse (28 Aug 2022 07:31)    HPI:      Hospital Course:   Transferred to CCU for further management     24 HOUR EVENTS:    REVIEW OF SYSTEMS:    CONSTITUTIONAL: No weakness, fevers or chills  EYES/ENT: No visual changes;  No vertigo or throat pain   NECK: No pain or stiffness  RESPIRATORY: No cough, wheezing, hemoptysis; No shortness of breath  CARDIOVASCULAR: No chest pain or palpitations  GASTROINTESTINAL: No abdominal or epigastric pain. No nausea, vomiting, or hematemesis; No diarrhea or constipation. No melena or hematochezia.  GENITOURINARY: No dysuria, frequency or hematuria  NEUROLOGICAL: No numbness or weakness  SKIN: No itching, rashes      ICU Vital Signs Last 24 Hrs  T(C): 36.7 (28 Aug 2022 19:00), Max: 36.7 (27 Aug 2022 23:00)  T(F): 98.1 (28 Aug 2022 19:00), Max: 98.1 (28 Aug 2022 19:00)  HR: 88 (28 Aug 2022 20:00) (72 - 97)  BP: 115/54 (28 Aug 2022 20:00) (90/51 - 124/62)  BP(mean): 77 (28 Aug 2022 20:00) (68 - 86)  ABP: --  ABP(mean): --  RR: 18 (28 Aug 2022 19:00) (15 - 28)  SpO2: 97% (28 Aug 2022 20:00) (92% - 100%)    O2 Parameters below as of 28 Aug 2022 20:00  Patient On (Oxygen Delivery Method): room air            CVP(mm Hg): --  CO: --  CI: --  PA: --  PA(mean): --  PA(direct): --  PCWP: --  LA: --  RA: --  SVR: --  SVRI: --  PVR: --  PVRI: --  I&O's Summary    27 Aug 2022 07:01  -  28 Aug 2022 07:00  --------------------------------------------------------  IN: 520 mL / OUT: 325 mL / NET: 195 mL        CAPILLARY BLOOD GLUCOSE    CAPILLARY BLOOD GLUCOSE          PHYSICAL EXAM:  GENERAL: No acute distress, well-developed  HEAD:  Atraumatic, Normocephalic  EYES: EOMI, PERRLA, conjunctiva and sclera clear  NECK: Supple, no lymphadenopathy, no JVD  CHEST/LUNG: CTAB; No wheezes, rales, or rhonchi  HEART: Regular rate and rhythm. Normal S1/S2. No murmurs, rubs, or gallops  ABDOMEN: Soft, non-tender, non-distended; normal bowel sounds, no organomegaly  EXTREMITIES:  2+ peripheral pulses b/l, No clubbing, cyanosis, or edema  NEUROLOGY: A&O x 3, no focal deficits  SKIN: No rashes or lesions    ============================I/O===========================   I&O's Detail    27 Aug 2022 07:01  -  28 Aug 2022 07:00  --------------------------------------------------------  IN:    Oral Fluid: 520 mL  Total IN: 520 mL    OUT:    Voided (mL): 325 mL  Total OUT: 325 mL    Total NET: 195 mL        ============================ LABS =========================                        11.1   7.42  )-----------( 266      ( 28 Aug 2022 03:08 )             32.5     08-28    140  |  107  |  9   ----------------------------<  82  3.6   |  22  |  0.56    Ca    9.0      28 Aug 2022 03:08  Phos  3.5     08-  Mg     2.0         TPro  5.9<L>  /  Alb  3.5  /  TBili  0.8  /  DBili  x   /  AST  22  /  ALT  20  /  AlkPhos  40      Troponin T, High Sensitivity Result: <6 ng/L (22 @ 10:56)  Troponin T, High Sensitivity Result: <6 ng/L (22 @ 06:50)  Troponin T, High Sensitivity Result: 7 ng/L (22 @ 22:43)              LIVER FUNCTIONS - ( 28 Aug 2022 03:08 )  Alb: 3.5 g/dL / Pro: 5.9 g/dL / ALK PHOS: 40 U/L / ALT: 20 U/L / AST: 22 U/L / GGT: x                   ======================Micro/Rad/Cardio=================  Telemtry: Reviewed   EKG: Reviewed  CXR: Reviewed  Culture: Reviewed   Echo: Transthoracic Echocardiogram:   Patient name: TAMMIE CRANE  YOB: 1990   Age: 32 (F)   MR#: [id number]  Study Date: 2022  Location: [place] [address]  DSonographer: Marcello Paul RDCS  Study quality: Technically fair  Referring Physician: Shilpa Smith MD  Blood Pressure: 109/79 mmHg  Height: 160 cm  Weight: 75 kg  BSA: 1.8 m2  Heart Rate: 87 mmHg  ------------------------------------------------------------------------  PROCEDURE: Transthoracic echocardiogram with 2-D, M-Mode  and complete spectral andcolor flow Doppler.  INDICATION: Chronic pulmonary embolism (I27.82)  ------------------------------------------------------------------------  Dimensions:    Normal Values:  LA:     3.0    2.0 - 4.0 cm  Ao:     2.3    2.0 - 3.8 cm  SEPTUM: 0.9    0.6 - 1.2 cm  PWT:    0.9    0.6 - 1.1 cm  LVIDd:  3.9    3.0 - 5.6 cm  LVIDs:  3.2    1.8 - 4.0 cm  Derived variables:  LVMI: 59 g/m2  RWT: 0.46  Fractional short: 18 %  EF (Visual Estimate): 40-45 %  Doppler Peak Velocity (m/sec): AoV=1.5  ------------------------------------------------------------------------  Conclusions:  1. Mitral annular calcification, otherwise normal mitral  valve. Minimal mitral regurgitation.  2. Normal trileaflet aortic valve. No aortic valve  regurgitation seen.  3. Mild-moderate global left ventricular systolic  dysfunction. There is increased trabeculation in the left  ventricular apex, suggestive of LVNC. Consider further  imaging with cMRI.  4. The right ventricle is not well visualized; grossly  normal right ventricular systolic function.  *** No previous Echo exam.  ------------------------------------------------------------------------  Confirmed on  2022 - 16:08:03 by Bereket Mckeon M.D.  ------------------------------------------------------------------------ (22 @ 10:41)    Cath:   ======================================================  PAST MEDICAL & SURGICAL HISTORY:  No pertinent past medical history      No significant past surgical history        ====================ASSESSMENT ==============  32 year old  @ 20+2 weeks (NERISSA: 23) presents to the ED after syncope and admitted to OBGYN service, found to have LBBB with chest pain w/ unremarkable cath but TTE showing HFmEF and possible non compaction                 Plan:  ====================== NEUROLOGY=====================  AOx3  Syncope; convulsive syncope possible vasovagal vs v arrythmia  - cont to monitor    ==================== RESPIRATORY======================  - no active issues      ====================CARDIOVASCULAR==================  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 May be peripartum cardiomyopathy causing conduction disease vs noncompaction cardiomyopathy  - Troponins negative  - Lopressor for rate control   - Cath unremarkable with no evidence of SCAD  - TTE showing HFmEF (40-45%) with signs of non compaction  - EP followinng; cMRI non con on Monday ()  - toprol for GDMT   - Genetic testing with Dr. Mckeon for inherited CM   - ObGyn: inpatient/OP fetal echo     metoprolol succinate ER 25 milliGRAM(s) Oral daily    ===================HEMATOLOGIC/ONC ===================  Normocytic anemia in the setting of pregnancy  - Cont to monitor  - iron studies  - Continue Heparin for venous thromboembolism prophylaxis.     heparin   Injectable 5000 Unit(s) SubCutaneous every 8 hours    ===================== RENAL =========================  Asymptomatic bacteriuria in the setting of pregnancy on UA  - Per OBGYN, will hold off on antibiotics and wait on urine culture  - Urine Cx contaminant, pt w/o dysuria, continued to hold abx     ==================== GASTROINTESTINAL===================  - Regular diet  - Bowel regimen with Miralax.   bisacodyl 5 milliGRAM(s) Oral at bedtime  polyethylene glycol 3350 17 Gram(s) Oral two times a day PRN Constipation  prenatal multivitamin 1 Tablet(s) Oral daily    =======================    ENDOCRINE  =====================  - F/u TSH, lipid, a1c    ========================INFECTIOUS DISEASE================  - no active issues.      Patient requires continuous monitoring with bedside rhythm monitoring, pulse ox monitoring, and intermittent blood gas analysis. Care plan discussed with ICU care team. Patient remained critical and at risk for life threatening decompensation.  Patient seen, examined and plan discussed with CCU team during rounds.     I have personally provided ____ minutes of critical care time excluding time spent on separate procedures, in addition to initial critical care time provided by the CICU Attending, Dr. Woods.     By signing my name below, I, Miky Castillo, attest that this documentation has been prepared under the direction and in the presence of Patricia Delgado NP   Electronically signed: Grover Willis, 22 @ 20:55    I, Patricia Delgado NP, personally performed the services described in this documentation. all medical record entries made by the scribe were at my direction and in my presence. I have reviewed the chart and agree that the record reflects my personal performance and is accurate and complete  Electronically signed: Patricia Delgado NP        TAMMIE CRANE  MRN-[id number]  Patient is a 32y old  Female who presents with a chief complaint of Syncope and collapse (28 Aug 2022 07:31)    HPI:      Hospital Course:   Transferred to CCU for further management     24 HOUR EVENTS:    REVIEW OF SYSTEMS:    CONSTITUTIONAL: No weakness, fevers or chills  EYES/ENT: No visual changes;  No vertigo or throat pain   NECK: No pain or stiffness  RESPIRATORY: No cough, wheezing, hemoptysis; No shortness of breath  CARDIOVASCULAR: No chest pain or palpitations  GASTROINTESTINAL: No abdominal or epigastric pain. No nausea, vomiting, or hematemesis; No diarrhea or constipation. No melena or hematochezia.  GENITOURINARY: No dysuria, frequency or hematuria  NEUROLOGICAL: No numbness or weakness  SKIN: No itching, rashes      ICU Vital Signs Last 24 Hrs  T(C): 36.7 (28 Aug 2022 19:00), Max: 36.7 (27 Aug 2022 23:00)  T(F): 98.1 (28 Aug 2022 19:00), Max: 98.1 (28 Aug 2022 19:00)  HR: 88 (28 Aug 2022 20:00) (72 - 97)  BP: 115/54 (28 Aug 2022 20:00) (90/51 - 124/62)  BP(mean): 77 (28 Aug 2022 20:00) (68 - 86)  ABP: --  ABP(mean): --  RR: 18 (28 Aug 2022 19:00) (15 - 28)  SpO2: 97% (28 Aug 2022 20:00) (92% - 100%)    O2 Parameters below as of 28 Aug 2022 20:00  Patient On (Oxygen Delivery Method): room air        I&O's Summary    27 Aug 2022 07:01  -  28 Aug 2022 07:00  --------------------------------------------------------  IN: 520 mL / OUT: 325 mL / NET: 195 mL        CAPILLARY BLOOD GLUCOSE    CAPILLARY BLOOD GLUCOSE          PHYSICAL EXAM:  GENERAL: No acute distress, well-developed  HEAD:  Atraumatic, Normocephalic  EYES: EOMI, PERRLA, conjunctiva and sclera clear  NECK: Supple, no lymphadenopathy, no JVD  CHEST/LUNG: CTAB; No wheezes, rales, or rhonchi  HEART: Regular rate and rhythm. Normal S1/S2. No murmurs, rubs, or gallops  ABDOMEN: Soft, non-tender, non-distended; normal bowel sounds, no organomegaly  EXTREMITIES:  2+ peripheral pulses b/l, No clubbing, cyanosis, or edema  NEUROLOGY: A&O x 3, no focal deficits  SKIN: No rashes or lesions    ============================I/O===========================   I&O's Detail    27 Aug 2022 07:01  -  28 Aug 2022 07:00  --------------------------------------------------------  IN:    Oral Fluid: 520 mL  Total IN: 520 mL    OUT:    Voided (mL): 325 mL  Total OUT: 325 mL    Total NET: 195 mL        ============================ LABS =========================                        11.1   7.42  )-----------( 266      ( 28 Aug 2022 03:08 )             32.5         140  |  107  |  9   ----------------------------<  82  3.6   |  22  |  0.56    Ca    9.0      28 Aug 2022 03:08  Phos  3.5       Mg     2.0         TPro  5.9<L>  /  Alb  3.5  /  TBili  0.8  /  DBili  x   /  AST  22  /  ALT  20  /  AlkPhos  40      Troponin T, High Sensitivity Result: <6 ng/L (22 @ 10:56)  Troponin T, High Sensitivity Result: <6 ng/L (22 @ 06:50)  Troponin T, High Sensitivity Result: 7 ng/L (22 @ 22:43)              LIVER FUNCTIONS - ( 28 Aug 2022 03:08 )  Alb: 3.5 g/dL / Pro: 5.9 g/dL / ALK PHOS: 40 U/L / ALT: 20 U/L / AST: 22 U/L / GGT: x                   ======================Micro/Rad/Cardio=================  Telemtry: Reviewed   EKG: Reviewed  CXR: Reviewed  Culture: Reviewed   Echo: Transthoracic Echocardiogram:   Patient name: TAMMIE CRANE  YOB: 1990   Age: 32 (F)   MR#: [id number]  Study Date: 2022  Location: [place] [address]  DSonographer: Marcello Paul RDCS  Study quality: Technically fair  Referring Physician: Shilpa Smith MD  Blood Pressure: 109/79 mmHg  Height: 160 cm  Weight: 75 kg  BSA: 1.8 m2  Heart Rate: 87 mmHg  ------------------------------------------------------------------------  PROCEDURE: Transthoracic echocardiogram with 2-D, M-Mode  and complete spectral andcolor flow Doppler.  INDICATION: Chronic pulmonary embolism (I27.82)  ------------------------------------------------------------------------  Dimensions:    Normal Values:  LA:     3.0    2.0 - 4.0 cm  Ao:     2.3    2.0 - 3.8 cm  SEPTUM: 0.9    0.6 - 1.2 cm  PWT:    0.9    0.6 - 1.1 cm  LVIDd:  3.9    3.0 - 5.6 cm  LVIDs:  3.2    1.8 - 4.0 cm  Derived variables:  LVMI: 59 g/m2  RWT: 0.46  Fractional short: 18 %  EF (Visual Estimate): 40-45 %  Doppler Peak Velocity (m/sec): AoV=1.5  ------------------------------------------------------------------------  Conclusions:  1. Mitral annular calcification, otherwise normal mitral  valve. Minimal mitral regurgitation.  2. Normal trileaflet aortic valve. No aortic valve  regurgitation seen.  3. Mild-moderate global left ventricular systolic  dysfunction. There is increased trabeculation in the left  ventricular apex, suggestive of LVNC. Consider further  imaging with cMRI.  4. The right ventricle is not well visualized; grossly  normal right ventricular systolic function.  *** No previous Echo exam.  ------------------------------------------------------------------------  Confirmed on  2022 - 16:08:03 by Bereket Mckeon M.D.  ------------------------------------------------------------------------ (22 @ 10:41)    Cath:   ======================================================  PAST MEDICAL & SURGICAL HISTORY:  No pertinent past medical history      No significant past surgical history        ====================ASSESSMENT ==============  32 year old  @ 20+2 weeks (NERISSA: 23) presents to the ED after syncope and admitted to OBGYN service, found to have LBBB with chest pain w/ unremarkable cath but TTE showing HFmEF and possible non compaction                 Plan:  ====================== NEUROLOGY=====================  AOx3  Syncope; convulsive syncope possible vasovagal vs v arrythmia  - cont to monitor    ==================== RESPIRATORY======================  - no active issues      ====================CARDIOVASCULAR==================  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 May be peripartum cardiomyopathy causing conduction disease vs noncompaction cardiomyopathy  - Troponins negative  - Lopressor for rate control   - Cath unremarkable with no evidence of SCAD  - TTE showing HFmEF (40-45%) with signs of non compaction  - EP followinng; cMRI non con on Monday ()  - toprol for GDMT   - Genetic testing with Dr. Mckeon for inherited CM   - ObGyn: inpatient/OP fetal echo     metoprolol succinate ER 25 milliGRAM(s) Oral daily    ===================HEMATOLOGIC/ONC ===================  Normocytic anemia in the setting of pregnancy  - Cont to monitor  - iron studies  - Continue Heparin for venous thromboembolism prophylaxis.     heparin   Injectable 5000 Unit(s) SubCutaneous every 8 hours    ===================== RENAL =========================  Asymptomatic bacteriuria in the setting of pregnancy on UA  - Per OBGYN, will hold off on antibiotics and wait on urine culture  - Urine Cx contaminant, pt w/o dysuria, continued to hold abx     ==================== GASTROINTESTINAL===================  - Regular diet  - Bowel regimen with Miralax.   bisacodyl 5 milliGRAM(s) Oral at bedtime  polyethylene glycol 3350 17 Gram(s) Oral two times a day PRN Constipation  prenatal multivitamin 1 Tablet(s) Oral daily    =======================    ENDOCRINE  =====================  - F/u TSH, lipid, a1c    ========================INFECTIOUS DISEASE================  - no active issues.      Patient requires continuous monitoring with bedside rhythm monitoring, pulse ox monitoring, and intermittent blood gas analysis. Care plan discussed with ICU care team. Patient remained critical and at risk for life threatening decompensation.  Patient seen, examined and plan discussed with CCU team during rounds.     I have personally provided __30__ minutes of critical care time excluding time spent on separate procedures, in addition to initial critical care time provided by the CICU Attending, Dr. Woods.     By signing my name below, I, Miky Castillo, attest that this documentation has been prepared under the direction and in the presence of Patricia Delgado NP   Electronically signed: Grover Willis, 22 @ 20:55    I, Patricia Delgado NP, personally performed the services described in this documentation. all medical record entries made by the scribe were at my direction and in my presence. I have reviewed the chart and agree that the record reflects my personal performance and is accurate and complete  Electronically signed: Patricia Delgado NP

## 2022-08-28 NOTE — PROGRESS NOTE ADULT - SUBJECTIVE AND OBJECTIVE BOX
Patient is a 32y old  Female who presents with a chief complaint of Syncope and collapse (27 Aug 2022 20:35)    HPI:       INTERVAL HPI/OVERNIGHT EVENTS:   No overnight events   Afebrile, hemodynamically stable     Subjective:    ICU Vital Signs Last 24 Hrs  T(C): 36.6 (28 Aug 2022 03:00), Max: 36.8 (27 Aug 2022 08:00)  T(F): 97.9 (28 Aug 2022 03:00), Max: 98.2 (27 Aug 2022 08:00)  HR: 72 (28 Aug 2022 07:01) (72 - 96)  BP: 90/51 (28 Aug 2022 07:01) (90/51 - 124/98)  BP(mean): 69 (28 Aug 2022 07:01) (68 - 109)  ABP: --  ABP(mean): --  RR: 28 (28 Aug 2022 07:01) (15 - 41)  SpO2: 98% (28 Aug 2022 07:01) (81% - 100%)    O2 Parameters below as of 27 Aug 2022 19:00  Patient On (Oxygen Delivery Method): room air          I&O's Summary    27 Aug 2022 07:01  -  28 Aug 2022 07:00  --------------------------------------------------------  IN: 520 mL / OUT: 325 mL / NET: 195 mL          Daily     Daily     Adult Advanced Hemodynamics Last 24 Hrs  CVP(mm Hg): --  CVP(cm H2O): --  CO: --  CI: --  PA: --  PA(mean): --  PCWP: --  SVR: --  SVRI: --  PVR: --  PVRI: --    EKG/Telemetry Events:    MEDICATIONS  (STANDING):  bisacodyl 5 milliGRAM(s) Oral at bedtime  heparin   Injectable 5000 Unit(s) SubCutaneous every 8 hours  metoprolol succinate ER 25 milliGRAM(s) Oral daily  prenatal multivitamin 1 Tablet(s) Oral daily    MEDICATIONS  (PRN):  polyethylene glycol 3350 17 Gram(s) Oral two times a day PRN Constipation      PHYSICAL EXAM:  GENERAL:   HEAD:  Atraumatic, Normocephalic  EYES: EOMI, PERRLA, conjunctiva and sclera clear  NECK: Supple, No JVD, Normal thyroid, no enlarged nodes  NERVOUS SYSTEM:  Alert & Awake.   CHEST/LUNG: B/L good air entry; No rales, rhonchi, or wheezing  HEART: S1S2 normal, no S3, Regular rate and rhythm; No murmurs  ABDOMEN: Soft, Nontender, Nondistended; Bowel sounds present  EXTREMITIES:  2+ Peripheral Pulses, No clubbing, cyanosis, or edema  LYMPH: No lymphadenopathy noted  SKIN: No rashes or lesions    LABS:                        11.1   7.42  )-----------( 266      ( 28 Aug 2022 03:08 )             32.5     08-28    140  |  107  |  9   ----------------------------<  82  3.6   |  22  |  0.56    Ca    9.0      28 Aug 2022 03:08  Phos  3.5     08-28  Mg     2.0     08-28    TPro  5.9<L>  /  Alb  3.5  /  TBili  0.8  /  DBili  x   /  AST  22  /  ALT  20  /  AlkPhos  40  08-28    LIVER FUNCTIONS - ( 28 Aug 2022 03:08 )  Alb: 3.5 g/dL / Pro: 5.9 g/dL / ALK PHOS: 40 U/L / ALT: 20 U/L / AST: 22 U/L / GGT: x             CAPILLARY BLOOD GLUCOSE                      RADIOLOGY & ADDITIONAL TESTS:  CXR:        Care Discussed with Consultants/Other Providers [ x] YES  [ ] NO           Patient is a 32y old  Female who presents with a chief complaint of Syncope and collapse (27 Aug 2022 20:35)    HPI:       INTERVAL HPI/OVERNIGHT EVENTS:   No overnight events   Afebrile, hemodynamically stable     Subjective: Patient seen and examined in AM. Complains of constipation- hasn't had a bowel movement in 4 days. Otherwise, denies cp, sob, syncope episode.     ICU Vital Signs Last 24 Hrs  T(C): 36.6 (28 Aug 2022 03:00), Max: 36.8 (27 Aug 2022 08:00)  T(F): 97.9 (28 Aug 2022 03:00), Max: 98.2 (27 Aug 2022 08:00)  HR: 72 (28 Aug 2022 07:01) (72 - 96)  BP: 90/51 (28 Aug 2022 07:01) (90/51 - 124/98)  BP(mean): 69 (28 Aug 2022 07:01) (68 - 109)  ABP: --  ABP(mean): --  RR: 28 (28 Aug 2022 07:01) (15 - 41)  SpO2: 98% (28 Aug 2022 07:01) (81% - 100%)    O2 Parameters below as of 27 Aug 2022 19:00  Patient On (Oxygen Delivery Method): room air          I&O's Summary    27 Aug 2022 07:01  -  28 Aug 2022 07:00  --------------------------------------------------------  IN: 520 mL / OUT: 325 mL / NET: 195 mL          Daily     Daily     Adult Advanced Hemodynamics Last 24 Hrs  CVP(mm Hg): --  CVP(cm H2O): --  CO: --  CI: --  PA: --  PA(mean): --  PCWP: --  SVR: --  SVRI: --  PVR: --  PVRI: --    EKG/Telemetry Events: Sinus, no acute events    MEDICATIONS  (STANDING):  bisacodyl 5 milliGRAM(s) Oral at bedtime  heparin   Injectable 5000 Unit(s) SubCutaneous every 8 hours  metoprolol succinate ER 25 milliGRAM(s) Oral daily  prenatal multivitamin 1 Tablet(s) Oral daily    MEDICATIONS  (PRN):  polyethylene glycol 3350 17 Gram(s) Oral two times a day PRN Constipation      PHYSICAL EXAM:  GENERAL: Sitting up in bed comfortably, little anxious   HEAD:  Atraumatic, Normocephalic  EYES: EOMI, PERRLA, conjunctiva and sclera clear  NECK: Supple, No JVD, Normal thyroid  NERVOUS SYSTEM:  Alert & Awake. AOx3  CHEST/LUNG: B/L good air entry; No rales, rhonchi, or wheezing  HEART: S1S2 normal, no S3, Regular rate and rhythm; No murmurs  ABDOMEN: Soft, Nontender, Nondistended; Bowel sounds present  EXTREMITIES:  2+ Peripheral Pulses, No clubbing, cyanosis, or edema  SKIN: No rashes or lesions    LABS:                        11.1   7.42  )-----------( 266      ( 28 Aug 2022 03:08 )             32.5     08-28    140  |  107  |  9   ----------------------------<  82  3.6   |  22  |  0.56    Ca    9.0      28 Aug 2022 03:08  Phos  3.5     08-28  Mg     2.0     08-28    TPro  5.9<L>  /  Alb  3.5  /  TBili  0.8  /  DBili  x   /  AST  22  /  ALT  20  /  AlkPhos  40  08-28    LIVER FUNCTIONS - ( 28 Aug 2022 03:08 )  Alb: 3.5 g/dL / Pro: 5.9 g/dL / ALK PHOS: 40 U/L / ALT: 20 U/L / AST: 22 U/L / GGT: x             CAPILLARY BLOOD GLUCOSE                      RADIOLOGY & ADDITIONAL TESTS:  CXR:        Care Discussed with Consultants/Other Providers [ x] YES  [ ] NO

## 2022-08-28 NOTE — PROGRESS NOTE ADULT - ASSESSMENT
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 May be peripartum cardiomyopathy causing conduction disease vs noncompaction cardiomyopathy  - Troponins negative  - Cath unremarkable with no evidence of SCAD  - TTE showing HFmEF (40-45%) with signs of non compaction  - EP followinng; cMRI non con on Monday ()  - toprol    #Renal/  Asymptomatic bacteriuria in the setting of pregnancy on UA  - Per OBGYN, will hold off on antibiotics and wait on urine culture    Pregnancy  - OBGYN following    #GI  - Regular diet    #Endocrine  - F/u TSH, lipid, a1c    #Heme/onc  Normocytic anemia in the setting of pregnancy  - Cont to monitor  - iron studies    #ID  - no active issues. 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 May be peripartum cardiomyopathy causing conduction disease vs noncompaction cardiomyopathy  - Troponins negative  - Cath unremarkable with no evidence of SCAD  - TTE showing HFmEF (40-45%) with signs of non compaction  - EP followinng; cMRI non con on Monday ()  - toprol for GDMT   - Genetic testing with Dr. Mckeon for inherited CM   - ObGyn: inpatient/OP fetal echo     #Renal/  Asymptomatic bacteriuria in the setting of pregnancy on UA  - Per OBGYN, will hold off on antibiotics and wait on urine culture  - Urine Cx contaminant, pt w/o dysuria, continued to hold abx     Pregnancy  - OBGYN following    #GI  - Regular diet    #Endocrine  - F/u TSH, lipid, a1c    #Heme/onc  Normocytic anemia in the setting of pregnancy  - Cont to monitor  - iron studies    #ID  - no active issues.

## 2022-08-28 NOTE — CHART NOTE - NSCHARTNOTEFT_GEN_A_CORE
CCU Transfer Note    Transfer from: CCU  Transfer to:  (  ) Medicine    ( x ) Telemetry    (  ) RCU    (  ) Palliative    (  ) Stroke Unit    (  ) _______________  Accepting physician: Dr. Smith    MEDICATIONS:  STANDING MEDICATIONS  bisacodyl 5 milliGRAM(s) Oral at bedtime  heparin   Injectable 5000 Unit(s) SubCutaneous every 8 hours  metoprolol succinate ER 25 milliGRAM(s) Oral daily  prenatal multivitamin 1 Tablet(s) Oral daily    PRN MEDICATIONS  polyethylene glycol 3350 17 Gram(s) Oral two times a day PRN      VITAL SIGNS: Last 24 Hours  T(C): 36.6 (28 Aug 2022 07:01), Max: 36.7 (27 Aug 2022 19:00)  T(F): 97.9 (28 Aug 2022 07:01), Max: 98.1 (27 Aug 2022 19:00)  HR: 93 (28 Aug 2022 17:54) (72 - 96)  BP: 114/70 (28 Aug 2022 17:54) (90/51 - 124/98)  BP(mean): 86 (28 Aug 2022 17:54) (68 - 109)  RR: 18 (28 Aug 2022 10:00) (15 - 28)  SpO2: 98% (28 Aug 2022 07:01) (92% - 100%)    LABS:                        11.1   7.42  )-----------( 266      ( 28 Aug 2022 03:08 )             32.5     08-28    140  |  107  |  9   ----------------------------<  82  3.6   |  22  |  0.56    Ca    9.0      28 Aug 2022 03:08  Phos  3.5     08-28  Mg     2.0     08-28    TPro  5.9<L>  /  Alb  3.5  /  TBili  0.8  /  DBili  x   /  AST  22  /  ALT  20  /  AlkPhos  40  08-28            Culture - Urine (collected 25 Aug 2022 19:53)  Source: Clean Catch Clean Catch (Midstream)  Final Report (27 Aug 2022 09:36):    >=3 organisms. Probable collection contamination.          RADIOLOGY:   Echo:   1. Mitral annular calcification, otherwise normal mitral  valve. Minimal mitral regurgitation.  2. Normal trileaflet aortic valve. No aortic valve  regurgitation seen.  3. Mild-moderate global left ventricular systolic  dysfunction. There is increased trabeculation in the left  ventricular apex, suggestive of LVNC. Consider further  imaging with cMRI.  4. The right ventricle is not well visualized; grossly  normal right ventricular systolic function.  *** No previous Echo exam.    CCU COURSE:  Pt was admitted to the CCU following an episode of syncope. ECG showed a LBBB. Echo showed possible evidence on Noncompaction and reduced EF to 40-45%. Patient started on Metoprolol 25 daily. Pending Cardiac MR (w/o IV contrast) tmrw.         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 May be peripartum cardiomyopathy causing conduction disease vs noncompaction cardiomyopathy  - Troponins negative  - Cath unremarkable with no evidence of SCAD  - TTE showing HFmEF (40-45%) with signs of non compaction  - EP followinng; cMRI non con on Monday ()  - toprol for GDMT   - Genetic testing with Dr. Mckeon for inherited CM   - ObGyn: inpatient/OP fetal echo     #Renal/  Asymptomatic bacteriuria in the setting of pregnancy on UA  - Per OBGYN, will hold off on antibiotics and wait on urine culture  - Urine Cx contaminant, pt w/o dysuria, continued to hold abx     Pregnancy  - OBGYN following    #GI  - Regular diet  - Pt states feels constipated and hasn't had BM in 4 days. Ecnouraged to take Miralax, walk, and suppositories     #Endocrine  - F/u TSH, lipid, a1c    #Heme/onc  Normocytic anemia in the setting of pregnancy  - Cont to monitor  - iron studies    #ID  - no active issues.        For Follow-Up:  [ ] cardiac MR tmrw w/o contrast   [] Rest as above CCU Transfer Note    Transfer from: CCU  Transfer to:  (  ) Medicine    ( x ) Telemetry    (  ) RCU    (  ) Palliative    (  ) Stroke Unit    (  ) _______________  Accepting physician: Dr. Smith    MEDICATIONS:  STANDING MEDICATIONS  bisacodyl 5 milliGRAM(s) Oral at bedtime  heparin   Injectable 5000 Unit(s) SubCutaneous every 8 hours  metoprolol succinate ER 25 milliGRAM(s) Oral daily  prenatal multivitamin 1 Tablet(s) Oral daily    PRN MEDICATIONS  polyethylene glycol 3350 17 Gram(s) Oral two times a day PRN      VITAL SIGNS: Last 24 Hours  T(C): 36.6 (28 Aug 2022 07:01), Max: 36.7 (27 Aug 2022 19:00)  T(F): 97.9 (28 Aug 2022 07:01), Max: 98.1 (27 Aug 2022 19:00)  HR: 93 (28 Aug 2022 17:54) (72 - 96)  BP: 114/70 (28 Aug 2022 17:54) (90/51 - 124/98)  BP(mean): 86 (28 Aug 2022 17:54) (68 - 109)  RR: 18 (28 Aug 2022 10:00) (15 - 28)  SpO2: 98% (28 Aug 2022 07:01) (92% - 100%)    LABS:                        11.1   7.42  )-----------( 266      ( 28 Aug 2022 03:08 )             32.5     08-28    140  |  107  |  9   ----------------------------<  82  3.6   |  22  |  0.56    Ca    9.0      28 Aug 2022 03:08  Phos  3.5     08-28  Mg     2.0     08-28    TPro  5.9<L>  /  Alb  3.5  /  TBili  0.8  /  DBili  x   /  AST  22  /  ALT  20  /  AlkPhos  40  08-28            Culture - Urine (collected 25 Aug 2022 19:53)  Source: Clean Catch Clean Catch (Midstream)  Final Report (27 Aug 2022 09:36):    >=3 organisms. Probable collection contamination.          RADIOLOGY:   Echo:   1. Mitral annular calcification, otherwise normal mitral  valve. Minimal mitral regurgitation.  2. Normal trileaflet aortic valve. No aortic valve  regurgitation seen.  3. Mild-moderate global left ventricular systolic  dysfunction. There is increased trabeculation in the left  ventricular apex, suggestive of LVNC. Consider further  imaging with cMRI.  4. The right ventricle is not well visualized; grossly  normal right ventricular systolic function.  *** No previous Echo exam.    CCU COURSE:  Pt was admitted to the CCU following an episode of syncope. ECG showed a LBBB. Echo showed possible evidence on Noncompaction and reduced EF to 40-45%. Patient started on Metoprolol 25 daily. Pending Cardiac MR (w/o IV contrast) tmrw.         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 May be peripartum cardiomyopathy causing conduction disease vs noncompaction cardiomyopathy  - Troponins negative  - Cath unremarkable with no evidence of SCAD  - TTE showing HFmEF (40-45%) with signs of non compaction  - EP followinng; cMRI non con on Monday ()  - toprol for GDMT   - Genetic testing with Dr. Mckeon for inherited CM   - ObGyn: inpatient/OP fetal echo     #Renal/  Asymptomatic bacteriuria in the setting of pregnancy on UA  - Per OBGYN, will hold off on antibiotics and wait on urine culture  - Urine Cx contaminant, pt w/o dysuria, continued to hold abx     Pregnancy  - OBGYN following    #GI  - Regular diet  - Pt states feels constipated and hasn't had BM in 4 days. Ecnouraged to take Miralax, walk, and suppositories     #Endocrine  - F/u TSH, lipid, a1c    #Heme/onc  Normocytic anemia in the setting of pregnancy  - Cont to monitor  - iron studies    #ID  - no active issues.        For Follow-Up:  [ ] cardiac MR tmrw w/o contrast   [ ] Will need genetic testing with Dr. Mckeon  [ ] Inaptient vs OP fetal echo   [ ] f/u constipation  [] Rest as above CCU Transfer Note    Transfer from: CCU  Transfer to:  (  ) Medicine    ( x ) Telemetry    (  ) RCU    (  ) Palliative    (  ) Stroke Unit    (  ) _______________  Accepting physician: Dr. Smith    MEDICATIONS:  STANDING MEDICATIONS  bisacodyl 5 milliGRAM(s) Oral at bedtime  heparin   Injectable 5000 Unit(s) SubCutaneous every 8 hours  metoprolol succinate ER 25 milliGRAM(s) Oral daily  prenatal multivitamin 1 Tablet(s) Oral daily    PRN MEDICATIONS  polyethylene glycol 3350 17 Gram(s) Oral two times a day PRN      VITAL SIGNS: Last 24 Hours  T(C): 36.6 (28 Aug 2022 07:01), Max: 36.7 (27 Aug 2022 19:00)  T(F): 97.9 (28 Aug 2022 07:01), Max: 98.1 (27 Aug 2022 19:00)  HR: 93 (28 Aug 2022 17:54) (72 - 96)  BP: 114/70 (28 Aug 2022 17:54) (90/51 - 124/98)  BP(mean): 86 (28 Aug 2022 17:54) (68 - 109)  RR: 18 (28 Aug 2022 10:00) (15 - 28)  SpO2: 98% (28 Aug 2022 07:01) (92% - 100%)    LABS:                        11.1   7.42  )-----------( 266      ( 28 Aug 2022 03:08 )             32.5     08-28    140  |  107  |  9   ----------------------------<  82  3.6   |  22  |  0.56    Ca    9.0      28 Aug 2022 03:08  Phos  3.5     08-28  Mg     2.0     08-28    TPro  5.9<L>  /  Alb  3.5  /  TBili  0.8  /  DBili  x   /  AST  22  /  ALT  20  /  AlkPhos  40  08-28            Culture - Urine (collected 25 Aug 2022 19:53)  Source: Clean Catch Clean Catch (Midstream)  Final Report (27 Aug 2022 09:36):    >=3 organisms. Probable collection contamination.          RADIOLOGY:   Echo:   1. Mitral annular calcification, otherwise normal mitral  valve. Minimal mitral regurgitation.  2. Normal trileaflet aortic valve. No aortic valve  regurgitation seen.  3. Mild-moderate global left ventricular systolic  dysfunction. There is increased trabeculation in the left  ventricular apex, suggestive of LVNC. Consider further  imaging with cMRI.  4. The right ventricle is not well visualized; grossly  normal right ventricular systolic function.  *** No previous Echo exam.    CCU COURSE:  Pt was admitted to the CCU following an episode of syncope. ECG showed a LBBB. Echo showed possible evidence on Noncompaction and reduced EF to 40-45%. Patient started on Metoprolol 25 daily. Pending Cardiac MR (w/o IV contrast) tmrw.         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 May be peripartum cardiomyopathy causing conduction disease vs noncompaction cardiomyopathy  - Troponins negative  - Cath unremarkable with no evidence of SCAD  - TTE showing HFmEF (40-45%) with signs of non compaction  - EP followinng; cMRI non con on Monday ()  - toprol for GDMT   - Genetic testing with Dr. Mckeon for inherited CM   - ObGyn: inpatient/OP fetal echo     #Renal/  Asymptomatic bacteriuria in the setting of pregnancy on UA  - Per OBGYN, will hold off on antibiotics and wait on urine culture  - Urine Cx contaminant, pt w/o dysuria, continued to hold abx     Pregnancy  - OBGYN following    #GI  - Regular diet  - Pt states feels constipated and hasn't had BM in 4 days. Ecnouraged to take Miralax, walk, and suppositories     #Endocrine  - F/u TSH, lipid, a1c    #Heme/onc  Normocytic anemia in the setting of pregnancy  - Cont to monitor  - iron studies    #ID  - no active issues.        For Follow-Up:  [ ] f/u results of cardiac mri   [ ] Will need genetic testing with Dr. Mckeon  [ ] Inaptient vs OP fetal echo   [ ] f/u constipation  [ ] f/u recommendations from electrophysiology team regarding placement of loop recorder

## 2022-08-29 DIAGNOSIS — R55 SYNCOPE AND COLLAPSE: ICD-10-CM

## 2022-08-29 DIAGNOSIS — I42.9 CARDIOMYOPATHY, UNSPECIFIED: ICD-10-CM

## 2022-08-29 LAB
ALBUMIN SERPL ELPH-MCNC: 3.6 G/DL — SIGNIFICANT CHANGE UP (ref 3.3–5)
ALP SERPL-CCNC: 43 U/L — SIGNIFICANT CHANGE UP (ref 40–120)
ALT FLD-CCNC: 21 U/L — SIGNIFICANT CHANGE UP (ref 10–45)
ANION GAP SERPL CALC-SCNC: 9 MMOL/L — SIGNIFICANT CHANGE UP (ref 5–17)
AST SERPL-CCNC: 26 U/L — SIGNIFICANT CHANGE UP (ref 10–40)
BILIRUB SERPL-MCNC: 0.9 MG/DL — SIGNIFICANT CHANGE UP (ref 0.2–1.2)
BLD GP AB SCN SERPL QL: POSITIVE — SIGNIFICANT CHANGE UP
BUN SERPL-MCNC: 11 MG/DL — SIGNIFICANT CHANGE UP (ref 7–23)
CALCIUM SERPL-MCNC: 8.9 MG/DL — SIGNIFICANT CHANGE UP (ref 8.4–10.5)
CHLORIDE SERPL-SCNC: 106 MMOL/L — SIGNIFICANT CHANGE UP (ref 96–108)
CO2 SERPL-SCNC: 24 MMOL/L — SIGNIFICANT CHANGE UP (ref 22–31)
CREAT SERPL-MCNC: 0.7 MG/DL — SIGNIFICANT CHANGE UP (ref 0.5–1.3)
EGFR: 119 ML/MIN/1.73M2 — SIGNIFICANT CHANGE UP
FERRITIN SERPL-MCNC: 16 NG/ML — SIGNIFICANT CHANGE UP (ref 15–150)
GLUCOSE SERPL-MCNC: 83 MG/DL — SIGNIFICANT CHANGE UP (ref 70–99)
HCT VFR BLD CALC: 32.7 % — LOW (ref 34.5–45)
HGB BLD-MCNC: 11 G/DL — LOW (ref 11.5–15.5)
IRON SATN MFR SERPL: 25 % — SIGNIFICANT CHANGE UP (ref 14–50)
IRON SATN MFR SERPL: 97 UG/DL — SIGNIFICANT CHANGE UP (ref 30–160)
MAGNESIUM SERPL-MCNC: 1.8 MG/DL — SIGNIFICANT CHANGE UP (ref 1.6–2.6)
MCHC RBC-ENTMCNC: 30.8 PG — SIGNIFICANT CHANGE UP (ref 27–34)
MCHC RBC-ENTMCNC: 33.6 GM/DL — SIGNIFICANT CHANGE UP (ref 32–36)
MCV RBC AUTO: 91.6 FL — SIGNIFICANT CHANGE UP (ref 80–100)
NRBC # BLD: 0 /100 WBCS — SIGNIFICANT CHANGE UP (ref 0–0)
PHOSPHATE SERPL-MCNC: 3.2 MG/DL — SIGNIFICANT CHANGE UP (ref 2.5–4.5)
PLATELET # BLD AUTO: 276 K/UL — SIGNIFICANT CHANGE UP (ref 150–400)
POTASSIUM SERPL-MCNC: 3.9 MMOL/L — SIGNIFICANT CHANGE UP (ref 3.5–5.3)
POTASSIUM SERPL-SCNC: 3.9 MMOL/L — SIGNIFICANT CHANGE UP (ref 3.5–5.3)
PROT SERPL-MCNC: 6.1 G/DL — SIGNIFICANT CHANGE UP (ref 6–8.3)
RBC # BLD: 3.57 M/UL — LOW (ref 3.8–5.2)
RBC # FLD: 13.2 % — SIGNIFICANT CHANGE UP (ref 10.3–14.5)
RH IG SCN BLD-IMP: NEGATIVE — SIGNIFICANT CHANGE UP
SODIUM SERPL-SCNC: 139 MMOL/L — SIGNIFICANT CHANGE UP (ref 135–145)
TIBC SERPL-MCNC: 388 UG/DL — SIGNIFICANT CHANGE UP (ref 220–430)
TRANSFERRIN SERPL-MCNC: 330 MG/DL — SIGNIFICANT CHANGE UP (ref 200–360)
UIBC SERPL-MCNC: 291 UG/DL — SIGNIFICANT CHANGE UP (ref 110–370)
WBC # BLD: 6.14 K/UL — SIGNIFICANT CHANGE UP (ref 3.8–10.5)
WBC # FLD AUTO: 6.14 K/UL — SIGNIFICANT CHANGE UP (ref 3.8–10.5)

## 2022-08-29 PROCEDURE — 99233 SBSQ HOSP IP/OBS HIGH 50: CPT | Mod: GC,25

## 2022-08-29 PROCEDURE — 86077 PHYS BLOOD BANK SERV XMATCH: CPT

## 2022-08-29 PROCEDURE — 99233 SBSQ HOSP IP/OBS HIGH 50: CPT

## 2022-08-29 PROCEDURE — 99232 SBSQ HOSP IP/OBS MODERATE 35: CPT

## 2022-08-29 PROCEDURE — 75557 CARDIAC MRI FOR MORPH: CPT | Mod: 26

## 2022-08-29 PROCEDURE — 93010 ELECTROCARDIOGRAM REPORT: CPT

## 2022-08-29 RX ORDER — MAGNESIUM SULFATE 500 MG/ML
2 VIAL (ML) INJECTION ONCE
Refills: 0 | Status: COMPLETED | OUTPATIENT
Start: 2022-08-29 | End: 2022-08-29

## 2022-08-29 RX ORDER — POTASSIUM CHLORIDE 20 MEQ
20 PACKET (EA) ORAL ONCE
Refills: 0 | Status: COMPLETED | OUTPATIENT
Start: 2022-08-29 | End: 2022-08-29

## 2022-08-29 RX ORDER — ACETAMINOPHEN 500 MG
650 TABLET ORAL ONCE
Refills: 0 | Status: COMPLETED | OUTPATIENT
Start: 2022-08-29 | End: 2022-08-29

## 2022-08-29 RX ADMIN — Medication 25 MILLIGRAM(S): at 05:45

## 2022-08-29 RX ADMIN — HEPARIN SODIUM 5000 UNIT(S): 5000 INJECTION INTRAVENOUS; SUBCUTANEOUS at 05:45

## 2022-08-29 RX ADMIN — HEPARIN SODIUM 5000 UNIT(S): 5000 INJECTION INTRAVENOUS; SUBCUTANEOUS at 20:59

## 2022-08-29 RX ADMIN — Medication 5 MILLIGRAM(S): at 22:32

## 2022-08-29 RX ADMIN — Medication 20 MILLIEQUIVALENT(S): at 06:32

## 2022-08-29 RX ADMIN — Medication 1 TABLET(S): at 16:33

## 2022-08-29 RX ADMIN — Medication 650 MILLIGRAM(S): at 01:15

## 2022-08-29 RX ADMIN — Medication 25 GRAM(S): at 06:32

## 2022-08-29 RX ADMIN — HEPARIN SODIUM 5000 UNIT(S): 5000 INJECTION INTRAVENOUS; SUBCUTANEOUS at 16:34

## 2022-08-29 RX ADMIN — Medication 650 MILLIGRAM(S): at 01:45

## 2022-08-29 NOTE — PROGRESS NOTE ADULT - ATTENDING COMMENTS
Patient is 20 weeks pregnant and presents with syncope.  She has a history of syncope that is classic vasovagal in its description, as was this episode.  LBBB  Cardiomyopathy seen on TTE - possible noncompaction.  Plan for MRI (non-contrast in the setting of pregnancy).  Started beta-blocker with metoprolol succinate 25 mg daily.  ILR prior to discharge.    Patient remains at high risk for decompensation or death.
P0 20w5d with syncopal episode, LBBB, possible cardiomyopathy on TTE (EF 40-45%), concern for noncompaction left vent cardiomyopathy vs peripartum.   no cardiac symptoms at present. no ob symptoms.   following with Cardio-OB and EP. following with MFM.   cardiac MRI done today - normal EF 57% in MRI and grossly  normal function. no evidence for LVNC.   EP has plan for loop recorder placement tomorrow.   if all normal and no symptoms, then possible DC tomorrow.   need to clarify if need for anticoagulation prophylaxis during pregnancy and frequency of cardiac follow up.   will also follow with MFM in outpt OB office.   Sue WILBURN
MFMABEL attendin year old  @ 20 weeks 2 days with episode of a recent syncopal episode yesterday and today with new onset left chest pain radiating to the neck. Concerning cardiac etiology given new LBBB on EKG.   S/p bedside echo, pending result  Normal troponins and pro-BNP making a diagnosis of pregnancy associated MI less likely although still possible.   Due to concerning symptoms and EKG findings, patient was counseling on proceeding with coronary angiography to r/o SCAD.  Risks and benefits including exposure to low amounts of radiation at this gestational age were discussed with Ms Terrell.   Fetal monitoring would not be indicated at this time, however fetal hearts tones will be done following procedure.   US today shows viable IUP, with occassional known PACs (initially diagnosed at her comprehensive scan).    Angiography showed no pathology, plan will be to monitor in the CCU.   If indicated, medical therapy including BB, CCB, nitrates, anticoagulants, ASA could be used.     I was present and participated in all key portions of assessment and plan. Agree with fellow's note     Dr Tyrel Delvalle
20 weeks pregnant with a family history of cardiomyopathy  Admitted with syncope and found to have a cardiomyopathy with concern for non-compaction  A+Ox3  New LBBB, coronary angiogram was unremarkable - EP is following, for Loop recorder post MRI  Hemodynamics acceptable, no pressors or inotropes  LVEF mildly reduced on TTE - continue Toprol  Concern for non-compaction - for cardiac MRI, consult the Cardiac Genetics team  O2 sats mid to high 90s on room air  DASH diet  Normal renal function, compensated on exam  H/H acceptable on HSQ for DVT prophylaxis  Afebrile, no antibiotics  Sugars controlled  No central access  OOB/ambulate
I visited patient  she is stable  will try to have her transferred to our antepartum course  care as per cardiology
MFM attending     32 year old  @ 20 weeks 4 days with episode of a recent syncopal episode and new onset left chest pain radiating to the neck. Concerning cardiac etiology given new LBBB on EKG.   Angiography showed no pathology, transferred to the CCU for further monitoring  S/p echo with HFmEF 40-45% with signs of noncompaction  Discussed with cardiology MRI w/ and w/o contrast. Will schedule MRI w/o contrast for monday and implantation of loop recorder. No events overnight on Telemetry  At this time unclear on etiology of her cardiomyopathy, however further testing will allow us to better define cause of current cardiac findings. Started on metoprolol and ppx AC. If noncompaction confirmed might require therapeutic AC. ASA is safe in pregnancy if indicated.   EP, cardioob, HF teams will be following. Can consider cardiac genetics consultation pending results of MRI due to possible genetic etiology of certain cardiac conditions such LVNC cardiomyopathy (especially in the setting of unclear family hx of cardiac disease).  We had extensive discussion with ms Terrell regarding risks of pregnancy in the setting of cardiomyopathy and possible irreversible deterioration in LV function postpartum as well as possible transmission of this condition to her fetus. A fetal echo was recommended and options of genetic testing discussed.   Further counseling will be provided following MRI.  Currently awaiting telemetry bed and transfer to the floor    I was present and participated in all key portions of assessment and plan.     Dr Tyrel Delvalle .
Patient is 20 weeks pregnant and presents with syncope.  She has a history of syncope that is classic vasovagal in its description, as was this episode.  LBBB  Cardiomyopathy seen on TTE - possible noncompaction.  Plan for MRI (non-contrast in the setting of pregnancy).  Started beta-blocker with metoprolol succinate 25 mg daily.  ILR prior to discharge.    Patient remains at high risk for decompensation or death.

## 2022-08-29 NOTE — PROGRESS NOTE ADULT - ASSESSMENT
32 year old  @ 20+3 weeks (NERISSA: 23) presents to the ED after syncope and admitted to OBGYN service, found to have LBBB with chest pain w/ unremarkable cath but TTE showing HFmEF and possible non compaction. 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 May be peripartum cardiomyopathy causing conduction disease vs noncompaction cardiomyopathy  - Troponins negative  - Cath unremarkable with no evidence of SCAD  - TTE showing HFmEF (40-45%) with signs of non compaction  - EP following; pt pending cMRI non con today ()  - c/w toprol for GDMT  - Outpatient genetic testing with Dr. Mckeon for inherited CM   - ObGyn: outpatient fetal echo     #Renal/  Asymptomatic bacteriuria in the setting of pregnancy on UA  - Per OBGYN, will hold off on antibiotics and wait on urine culture  - Urine Cx contaminant, pt w/o dysuria, continued to hold abx     Pregnancy  - OBGYN following    #GI  - Pt has not had a bowel movement in several days, c/w dulcolax and miralax  - Regular diet, tolerating well    #Endocrine  - no active issues    #Heme/onc  Normocytic anemia in the setting of pregnancy  - Cont to monitor  - iron studies    #ID  - no active issues.

## 2022-08-29 NOTE — PROGRESS NOTE ADULT - SUBJECTIVE AND OBJECTIVE BOX
24H hour events: No acute events    MEDICATIONS:  heparin   Injectable 5000 Unit(s) SubCutaneous every 8 hours  metoprolol succinate ER 25 milliGRAM(s) Oral daily  bisacodyl 5 milliGRAM(s) Oral at bedtime  polyethylene glycol 3350 17 Gram(s) Oral two times a day PRN  prenatal multivitamin 1 Tablet(s) Oral daily      REVIEW OF SYSTEMS:  Complete 10point ROS negative.    PHYSICAL EXAM:  T(C): 36.7 (08-29-22 @ 04:00), Max: 36.7 (08-28-22 @ 19:00)  HR: 74 (08-29-22 @ 07:00) (64 - 98)  BP: 96/50 (08-29-22 @ 07:00) (90/44 - 124/62)  RR: 18 (08-28-22 @ 19:00) (18 - 18)  SpO2: 100% (08-29-22 @ 07:00) (97% - 100%)  Wt(kg): --  I&O's Summary    28 Aug 2022 07:01  -  29 Aug 2022 07:00  --------------------------------------------------------  IN: 290 mL / OUT: 0 mL / NET: 290 mL        Appearance: Normal	  Cardiovascular: Normal S1 S2, No JVD, No murmurs, No edema  Respiratory: Lungs clear to auscultation	  Psychiatry: A & O x 3, Mood & affect appropriate  Gastrointestinal:  Soft, Non-tender, + BS	  Skin: No rashes, No ecchymoses, No cyanosis	  Neurologic: Non-focal  Extremities: No clubbing, cyanosis or edema  Vascular: Peripheral pulses palpable 2+ bilaterally        LABS:	 	    CBC Full  -  ( 29 Aug 2022 04:11 )  WBC Count : 6.14 K/uL  Hemoglobin : 11.0 g/dL  Hematocrit : 32.7 %  Platelet Count - Automated : 276 K/uL  Mean Cell Volume : 91.6 fl  Mean Cell Hemoglobin : 30.8 pg  Mean Cell Hemoglobin Concentration : 33.6 gm/dL  Auto Neutrophil # : x  Auto Lymphocyte # : x  Auto Monocyte # : x  Auto Eosinophil # : x  Auto Basophil # : x  Auto Neutrophil % : x  Auto Lymphocyte % : x  Auto Monocyte % : x  Auto Eosinophil % : x  Auto Basophil % : x    08-29    139  |  106  |  11  ----------------------------<  83  3.9   |  24  |  0.70  08-28    140  |  107  |  9   ----------------------------<  82  3.6   |  22  |  0.56    Ca    8.9      29 Aug 2022 04:11  Ca    9.0      28 Aug 2022 03:08  Phos  3.2     08-29  Phos  3.5     08-28  Mg     1.8     08-29  Mg     2.0     08-28    TPro  6.1  /  Alb  3.6  /  TBili  0.9  /  DBili  x   /  AST  26  /  ALT  21  /  AlkPhos  43  08-29  TPro  5.9<L>  /  Alb  3.5  /  TBili  0.8  /  DBili  x   /  AST  22  /  ALT  20  /  AlkPhos  40  08-28      proBNP: Serum Pro-Brain Natriuretic Peptide: 78 pg/mL (08-25 @ 18:04)      TELEMETRY: SR 60-90s	      < from: Transthoracic Echocardiogram (08.26.22 @ 10:41) >  PROCEDURE: Transthoracic echocardiogram with 2-D, M-Mode  and complete spectral andcolor flow Doppler.  INDICATION: Chronic pulmonary embolism (I27.82)  ------------------------------------------------------------------------  Dimensions:    Normal Values:  LA:     3.0    2.0 - 4.0 cm  Ao:     2.3    2.0 - 3.8 cm  SEPTUM: 0.9    0.6 - 1.2 cm  PWT:    0.9    0.6 - 1.1 cm  LVIDd:  3.9    3.0 - 5.6 cm  LVIDs:  3.2    1.8 - 4.0 cm  Derived variables:  LVMI: 59 g/m2  RWT: 0.46  Fractional short: 18 %  EF (Visual Estimate): 40-45 %  Doppler Peak Velocity (m/sec): AoV=1.5  ------------------------------------------------------------------------  Observations:  Mitral Valve: Mitral annular calcification, otherwise  normal mitral valve. Minimal mitral regurgitation.  Aortic Valve/Aorta: Normal trileaflet aortic valve. Peak  transaortic valve gradient equals 9 mm Hg, estimated aortic  valve area equals 2.3 sqcm. No aortic valve regurgitation  seen. Peak left ventricular outflow tract gradient equals 6  mm Hg, mean gradient is equal to 4 mm Hg.  Aortic Root: 2.3 cm.  LVOT diameter: 1.9 cm.  Left Atrium: LA volume index = 15 cc/m2.  Left Ventricle: Mild-moderate global left ventricular  systolic dysfunction. There is increased trabeculation in  the left ventricular apex, suggestive of LVNC. Consider  further imaging with cMRI.Normal left ventricular  internal dimensions and wall thicknesses. Mild diastolic  dysfunction (Stage I).  Right Heart: Normal right atrium. The right ventricle is  not well visualized; grossly normal right ventricular  systolic function. Normal tricuspid valve. Minimal  tricuspid regurgitation. Normal pulmonic valve. No pulmonic  regurgitation.  Pericardium/Pleura: Normal pericardium with no pericardial  effusion.  ------------------------------------------------------------------------  Conclusions:  1. Mitral annular calcification, otherwise normal mitral  valve. Minimal mitral regurgitation.  2. Normal trileaflet aortic valve. No aortic valve  regurgitation seen.  3. Mild-moderate global left ventricular systolic  dysfunction. There is increased trabeculation in the left  ventricular apex, suggestive of LVNC. Consider further  imaging with cMRI.  4. The right ventricle is not well visualized; grossly  normal right ventricular systolic function.  *** No previous Echo exam.    < end of copied text >

## 2022-08-29 NOTE — PROGRESS NOTE ADULT - ASSESSMENT
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 Underwent LHC which was negative with LVEDP 7. Notably has family history of maternal uncle with ICD in his 30s. Currently appears euvolemic/hypovolemic with NYHA class I symptoms. Awaiting cardiac MRI.

## 2022-08-29 NOTE — PROGRESS NOTE ADULT - SUBJECTIVE AND OBJECTIVE BOX
Interval History:  feels well  denies complaints    Medications:  bisacodyl 5 milliGRAM(s) Oral at bedtime  heparin   Injectable 5000 Unit(s) SubCutaneous every 8 hours  metoprolol succinate ER 25 milliGRAM(s) Oral daily  polyethylene glycol 3350 17 Gram(s) Oral two times a day PRN  prenatal multivitamin 1 Tablet(s) Oral daily      Vitals:  T(C): 37 (22 @ 18:40), Max: 37 (22 @ 18:40)  HR: 86 (22 @ 18:40) (64 - 99)  BP: 105/55 (22 @ 18:40) (90/44 - 124/56)  BP(mean): 72 (22 @ 16:00) (63 - 80)  RR: 16 (22 @ 18:40) (16 - 16)  SpO2: 100% (22 @ 18:40) (100% - 100%)    Daily     Daily Weight in k.6 (29 Aug 2022 04:00)    Weight (kg): 48.6 ( @ 03:00)    I&O's Summary    28 Aug 2022 07:  -  29 Aug 2022 07:00  --------------------------------------------------------  IN: 290 mL / OUT: 0 mL / NET: 290 mL    29 Aug 2022 07:  -  29 Aug 2022 21:12  --------------------------------------------------------  IN: 360 mL / OUT: 0 mL / NET: 360 mL    Physical Exam:  Appearance: No Acute Distress  HEENT: PERRL  Neck: No JVD  Cardiovascular: Normal S1 S2, No murmurs/rubs/gallops  Respiratory: Clear to auscultation bilaterally  Gastrointestinal: Soft, Non-tender	  Skin: No cyanosis	  Neurologic: Non-focal  Extremities: No LE edema  Psychiatry: A & O x 3, Mood & affect appropriate    Labs:                        11.0   6.14  )-----------( 276      ( 29 Aug 2022 04:11 )             32.7         139  |  106  |  11  ----------------------------<  83  3.9   |  24  |  0.70    Ca    8.9      29 Aug 2022 04:11  Phos  3.2       Mg     1.8         TPro  6.1  /  Alb  3.6  /  TBili  0.9  /  DBili  x   /  AST  26  /  ALT  21  /  AlkPhos  43

## 2022-08-29 NOTE — PROGRESS NOTE ADULT - SUBJECTIVE AND OBJECTIVE BOX
Subjective  No acute events reported overnight.  The patient is complaining of constipation.  She has not had a regular bowel movement since last Thursday.  Did take Colace yesterday with no significant improvement in stool output.  Feels her abdomen is mildly distended.  Denies any chest pain/tightness/discomfort, light headed sensation, dizziness or palpitations.  No abdominal pain.  Continues to have GERD burning sensation.    Telemetry  Sinus rhythm with no VT/VF or SVT    Review of systems  14 point review of systems otherwise unremarkable except what described above in history of present illness    Physical examination   No apparent distress, alert and oriented 3, appropriate affect   JVD not elevated, supple, no lymphadenopathy   Clear to auscultation bilaterally no wheezing rhonchi crackles  Regular rate and rhythm with no murmur rub or gallop   Positive bowel sound, soft, gravid, no masses guarding or rebound tenderness   No clubbing, cyanosis or edema  Moving all extremity spontaneously  2+ DP/PT pulses no focal deficits    Assessment/plan  Cardiomyopathy  --The patient is scheduled to get a cardiac MRI without gadolinium later today.  This was discussed with OB, CICU and cardiology imaging team.  Indications and details of the study were reviewed with the patient and her .  Differential includes but is not limited to peripartum cardiomyopathy and noncompaction syndrome.  If the patient does have noncompaction syndrome we will need to discuss pros/cons of starting the patient on anticoagulation therapy.  --Patient is being seen by electrophysiology service.  Following cardiac MRI plan for Linq monitor.  --Recommend the patient be genetically tested for inherited underlying cardiomyopathy.  --The patient appears euvolemic to dry on physical examination.  --The patient's syncopal event appears to be vasovagal.  Per the patient and her  she does not drink adequately.  She does have a history of IBS.  Unclear if the patient benefits from being on a beta-blocker.  --TTE on 8/26 2022 demonstrated mitral annular calcification.  Minimal mitral valve regurgitation.  No aortic valve regurgitation seen.  Normal trileaflet aortic valve.  Mild–moderate global left ventricular systolic dysfunction.  Increased trabeculation in the left ventricular apex.  The right ventricle is not well visualized.  Grossly normal right ventricular systolic function.  --EKG demonstrates left bundle branch block (no prior EKG was performed or available for review)  --Lower extremity venous duplex study on 8/26/2022 demonstrate no evidence of DVT in either lower extremity.  --Negative for Lyme.  -- Continue telemetry monitoring.  -- Aim for potassium greater than 4 magnesium greater than 2.    All questions and concerns of the patient and her  were addressed.    Findings and plan were discussed with CICU team/Dr. Winkler.    Attestation Statements:   Time-based billing (NON-critical care).     35 minutes spent on total encounter; more than 50% of the visit was spent counseling and / or coordinating care by the attending physician.  The necessity of the time spent during the encounter on this date of service was due to:     education, assessment and coordination of care.

## 2022-08-29 NOTE — PROGRESS NOTE ADULT - SUBJECTIVE AND OBJECTIVE BOX
MFM Consult Note     Patient seen and examined at bedside. She denies any cardiac symptoms this morning. She denies chest pain. Endorses some abdominal discomfort, which she relates to constipation. Patient is unsure if she is feeling fetal movements yet or more gas. She denies dizziness or feeling light headed.     ICU Vital Signs Last 24 Hrs  T(C): 36.7 (29 Aug 2022 04:00), Max: 36.7 (28 Aug 2022 19:00)  T(F): 98 (29 Aug 2022 04:00), Max: 98.1 (28 Aug 2022 19:00)  HR: 90 (29 Aug 2022 13:15) (64 - 99)  BP: 103/57 (29 Aug 2022 13:15) (90/44 - 124/62)  BP(mean): 75 (29 Aug 2022 13:15) (63 - 86)  ABP: --  ABP(mean): --  RR: 18 (28 Aug 2022 19:00) (18 - 18)  SpO2: 100% (29 Aug 2022 07:00) (97% - 100%)    O2 Parameters below as of 29 Aug 2022 07:00  Patient On (Oxygen Delivery Method): room air        Gen: NAD   Pulm: non labored breathing   CV: RRR   Abd: soft, mildly distended

## 2022-08-29 NOTE — PROGRESS NOTE ADULT - SUBJECTIVE AND OBJECTIVE BOX
Patient is a 32y old  Female who presents with a chief complaint of cardiomyopathy (29 Aug 2022 09:30)    HPI:  32 year old  @ 20+1 weeks (NERISSA: 23) with no medical history who presented to the ED 22 s/p syncope and fall. Patient states that at this time she was walking to the top of the stairs when she suddenly began to feel lower abdominal pain, dizzy and like the room was spinning. She felt like she might syncopize so she tried to sit down, and then awoke at the bottom of the stairs. She thinks LOC was only seconds and slid down the stairs. She does report multiple prior episodes of pre syncope associated with dizziness, LH and diaphoresis while standing outside in the heat that improve when she sits down. She also reported CP that began last night, but denies prior syncope, palpitations, VEGA or SOB. The patient was found to have a new LBBB (never had an EKG). She underwent cardiac cath due to CP to r/o SCAD which was unremarkable. 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 Patient currently pending cardiac MR, and placement of loop recorder prior to DC.      INTERVAL HPI/OVERNIGHT EVENTS:   No overnight events   Afebrile, hemodynamically stable     Subjective:  Patient reports feeling ok this morning, complaining of some abdominal pressure which is normal in the setting of her pregnancy. Denies any chest pain, shortness of breath, dizziness, or vertigo. Patient reports that she is able to walk around her room with no presyncope like symptoms.    ICU Vital Signs Last 24 Hrs  T(C): 36.7 (29 Aug 2022 04:00), Max: 36.7 (28 Aug 2022 19:00)  T(F): 98 (29 Aug 2022 04:00), Max: 98.1 (28 Aug 2022 19:00)  HR: 90 (29 Aug 2022 13:15) (64 - 99)  BP: 103/57 (29 Aug 2022 13:15) (90/44 - 124/62)  BP(mean): 75 (29 Aug 2022 13:15) (63 - 86)  ABP: --  ABP(mean): --  RR: 18 (28 Aug 2022 19:00) (18 - 18)  SpO2: 100% (29 Aug 2022 07:00) (97% - 100%)    O2 Parameters below as of 29 Aug 2022 07:00  Patient On (Oxygen Delivery Method): room air          I&O's Summary    28 Aug 2022 07:01  -  29 Aug 2022 07:00  --------------------------------------------------------  IN: 290 mL / OUT: 0 mL / NET: 290 mL    29 Aug 2022 07:01  -  29 Aug 2022 13:43  --------------------------------------------------------  IN: 240 mL / OUT: 0 mL / NET: 240 mL          Daily     Daily Weight in k.6 (29 Aug 2022 04:00)    Adult Advanced Hemodynamics Last 24 Hrs  CVP(mm Hg): --  CVP(cm H2O): --  CO: --  CI: --  PA: --  PA(mean): --  PCWP: --  SVR: --  SVRI: --  PVR: --  PVRI: --    EKG/Telemetry Events: sinus rhythm, no significant events on tele    MEDICATIONS  (STANDING):  bisacodyl 5 milliGRAM(s) Oral at bedtime  heparin   Injectable 5000 Unit(s) SubCutaneous every 8 hours  metoprolol succinate ER 25 milliGRAM(s) Oral daily  prenatal multivitamin 1 Tablet(s) Oral daily    MEDICATIONS  (PRN):  polyethylene glycol 3350 17 Gram(s) Oral two times a day PRN Constipation      PHYSICAL EXAM:  GENERAL:   HEAD:  Atraumatic, Normocephalic  EYES: EOMI, PERRLA, conjunctiva and sclera clear  NECK: Supple, No JVD, Normal thyroid, no enlarged nodes  NERVOUS SYSTEM:  Alert & Awake.   CHEST/LUNG: B/L good air entry; No rales, rhonchi, or wheezing  HEART: S1S2 normal, no S3, Regular rate and rhythm; No murmurs  ABDOMEN: Soft, Nontender, Nondistended; Bowel sounds present  EXTREMITIES:  Trace nonpitting edema. 2+ Peripheral Pulses. No clubbing or cyanosis  LYMPH: No lymphadenopathy noted  SKIN: No rashes or lesions    LABS:                        11.0   6.14  )-----------( 276      ( 29 Aug 2022 04:11 )             32.7         139  |  106  |  11  ----------------------------<  83  3.9   |  24  |  0.70    Ca    8.9      29 Aug 2022 04:11  Phos  3.2       Mg     1.8         TPro  6.1  /  Alb  3.6  /  TBili  0.9  /  DBili  x   /  AST  26  /  ALT  21  /  AlkPhos  43      LIVER FUNCTIONS - ( 29 Aug 2022 04:11 )  Alb: 3.6 g/dL / Pro: 6.1 g/dL / ALK PHOS: 43 U/L / ALT: 21 U/L / AST: 26 U/L / GGT: x             CAPILLARY BLOOD GLUCOSE                      RADIOLOGY & ADDITIONAL TESTS:  CXR:  < from: Transthoracic Echocardiogram (22 @ 10:41) >  Conclusions:  1. Mitral annular calcification, otherwise normal mitral  valve. Minimal mitral regurgitation.  2. Normal trileaflet aortic valve. No aortic valve  regurgitation seen.  3. Mild-moderate global left ventricular systolic  dysfunction. There is increased trabeculation in the left  ventricular apex, suggestive of LVNC. Consider further  imaging with cMRI.  4. The right ventricle is not well visualized; grossly  normal right ventricular systolic function.    < end of copied text >        Care Discussed with Consultants/Other Providers [ x] YES  [ ] NO

## 2022-08-29 NOTE — PROGRESS NOTE ADULT - SUBJECTIVE AND OBJECTIVE BOX
R3 Antepartum Note, HD#5    Patient seen and examined at bedside, no acute overnight events. No acute complaints. Pt reports +FM, denies LOF, VB, ctx, HA, epigastric pain, blurred vision, CP, SOB, N/V, fevers, and chills.    Vital Signs Last 24 Hours  T(C): 36.7 (08-29-22 @ 04:00), Max: 36.7 (08-28-22 @ 19:00)  HR: 64 (08-29-22 @ 06:00) (64 - 98)  BP: 91/44 (08-29-22 @ 06:00) (90/44 - 124/62)  RR: 18 (08-28-22 @ 19:00) (15 - 28)  SpO2: 100% (08-29-22 @ 05:00) (97% - 100%)    CAPILLARY BLOOD GLUCOSE          Physical Exam:  General: NAD  Abdomen: Soft, non-tender, gravid  Ext: No pain or swelling      Labs:             11.0   6.14  )-----------( 276      ( 08-29 @ 04:11 )             32.7     08-29 @ 04:11    139  |  106  |  11  ----------------------------<  83  3.9   |  24  |  0.70    Ca    8.9      08-29 @ 04:11  Phos  3.2     08-29 @ 04:11  Mg     1.8     08-29 @ 04:11    TPro  6.1  /  Alb  3.6  /  TBili  0.9  /  DBili  x   /  AST  26  /  ALT  21  /  AlkPhos  43  08-29 @ 04:11            MEDICATIONS  (STANDING):  bisacodyl 5 milliGRAM(s) Oral at bedtime  heparin   Injectable 5000 Unit(s) SubCutaneous every 8 hours  metoprolol succinate ER 25 milliGRAM(s) Oral daily  prenatal multivitamin 1 Tablet(s) Oral daily    MEDICATIONS  (PRN):  polyethylene glycol 3350 17 Gram(s) Oral two times a day PRN Constipation

## 2022-08-29 NOTE — PROGRESS NOTE ADULT - ASSESSMENT
31yo  @ 20w5d admitted to the antepartum service s/p a fall vas synchopal episode now found to have HFmEF and LVNC. Patient had a fall on 22 at 3pm where she fellt lightheaded and subsequently lost conscious and regained consciousness after falling down 14 carpeted steps. After the fall the patient complained of constant lower back pain as well intermittent lower pelvic pain and was ruled out for PTL. Patient is now s/p neg cardiac cath but with TTE showing HFmEF and LVNC. Patient currently in stable condition, VSS, asymptomatic this AM.     #HFmEF  - LBBB on initial EKG   - Cardiac cath neg  - TTE: EF 40-45% with signs suggestive of LVNC  - Troponins wnl  - Tele monitoring   - Appreciate cardiology recs: con;t Metorpolol 25mg qD  - plan for cardiac MR w/o Gadolinium today     #Maternal well being  - Rhogam administrated  - Regular diet  - PNVs  - Miralax/senna prn   - HSQ for DTV prophylaxis   - FH prn    Liberty Perez PGY3

## 2022-08-29 NOTE — PROGRESS NOTE ADULT - ASSESSMENT
32 year old  @ 20+1 weeks (NERISSA: 23) with no medical history who presented to the ED 22 s/p a fall at 3pm. Patient states that at this time she was walking to the top of the stairs when she suddenly began to feel lower abdominal pain, dizzy and like the room was spinning. She felt like she might syncopize so she tried to sit down, and then awoke at the bottom of the stairs. She was found to have new LBBB. Echo with an EF 40-45%, concern for LVNC.    1. Syncope likely vasovagal   2. LBBB  3. EF 40-45%, concern for LVNC    - New LBBB and CP s/p negative cath for CAD/SCAD  - Echo with EF 40-45% and concern for LVNC  - cMRI without contrast today  - Continue Toprol XL 25 mg daily  - Genetic testing with Dr. Mckeon to evaluate for inherited Cardiomyopathy   - Close telemetry monitoring  - Maintain K>4.0 and Mg>2.0

## 2022-08-29 NOTE — PROGRESS NOTE ADULT - ASSESSMENT
33 yo  at 20w5d presenting with a syncopal episode, found with LBBB on ECG. Cath with no findings, though TTE concerning for heart failure with reduced EF, and trabeculations suspicious for non compaction cardiomyopathy. Definitive diagnosis pending MRI, scheduled for today. 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 Patient with some family history on her paternal side and therefore genomics team is following. Fetal PACs present and will need Fetal ECHO, likely to be done as an outpatient.   - For Cardiac MRI today without contrast  - For inpatient vs outpatient fetal echo pending discharge plan   - Currently on prophylactic anticoagulation with Lovenox 40mg   - Metoprolol 25 mg per CICU   - Bowel regimen per primary team  - Will continue multidisciplinary discussion   - [facility]     Carly Hirschberg, MFM Attending   Seen and counseled with DOMITILA Thomas Attending

## 2022-08-30 ENCOUNTER — TRANSCRIPTION ENCOUNTER (OUTPATIENT)
Age: 32
End: 2022-08-30

## 2022-08-30 VITALS
HEART RATE: 74 BPM | RESPIRATION RATE: 18 BRPM | TEMPERATURE: 98 F | SYSTOLIC BLOOD PRESSURE: 102 MMHG | OXYGEN SATURATION: 98 % | DIASTOLIC BLOOD PRESSURE: 65 MMHG

## 2022-08-30 PROCEDURE — 83540 ASSAY OF IRON: CPT

## 2022-08-30 PROCEDURE — 93970 EXTREMITY STUDY: CPT

## 2022-08-30 PROCEDURE — 87086 URINE CULTURE/COLONY COUNT: CPT

## 2022-08-30 PROCEDURE — 99232 SBSQ HOSP IP/OBS MODERATE 35: CPT

## 2022-08-30 PROCEDURE — 86769 SARS-COV-2 COVID-19 ANTIBODY: CPT

## 2022-08-30 PROCEDURE — 82728 ASSAY OF FERRITIN: CPT

## 2022-08-30 PROCEDURE — 84100 ASSAY OF PHOSPHORUS: CPT

## 2022-08-30 PROCEDURE — 86901 BLOOD TYPING SEROLOGIC RH(D): CPT

## 2022-08-30 PROCEDURE — 86618 LYME DISEASE ANTIBODY: CPT

## 2022-08-30 PROCEDURE — C1769: CPT

## 2022-08-30 PROCEDURE — 93458 L HRT ARTERY/VENTRICLE ANGIO: CPT

## 2022-08-30 PROCEDURE — 83735 ASSAY OF MAGNESIUM: CPT

## 2022-08-30 PROCEDURE — 85027 COMPLETE CBC AUTOMATED: CPT

## 2022-08-30 PROCEDURE — C1894: CPT

## 2022-08-30 PROCEDURE — 83550 IRON BINDING TEST: CPT

## 2022-08-30 PROCEDURE — 80053 COMPREHEN METABOLIC PANEL: CPT

## 2022-08-30 PROCEDURE — 75557 CARDIAC MRI FOR MORPH: CPT

## 2022-08-30 PROCEDURE — 85384 FIBRINOGEN ACTIVITY: CPT

## 2022-08-30 PROCEDURE — 85025 COMPLETE CBC W/AUTO DIFF WBC: CPT

## 2022-08-30 PROCEDURE — 93306 TTE W/DOPPLER COMPLETE: CPT

## 2022-08-30 PROCEDURE — G0463: CPT

## 2022-08-30 PROCEDURE — 86870 RBC ANTIBODY IDENTIFICATION: CPT

## 2022-08-30 PROCEDURE — 36415 COLL VENOUS BLD VENIPUNCTURE: CPT

## 2022-08-30 PROCEDURE — 93005 ELECTROCARDIOGRAM TRACING: CPT

## 2022-08-30 PROCEDURE — C1887: CPT

## 2022-08-30 PROCEDURE — 84466 ASSAY OF TRANSFERRIN: CPT

## 2022-08-30 PROCEDURE — 86900 BLOOD TYPING SEROLOGIC ABO: CPT

## 2022-08-30 PROCEDURE — 85460 HEMOGLOBIN FETAL: CPT

## 2022-08-30 PROCEDURE — 86850 RBC ANTIBODY SCREEN: CPT

## 2022-08-30 PROCEDURE — 84484 ASSAY OF TROPONIN QUANT: CPT

## 2022-08-30 RX ORDER — METOPROLOL TARTRATE 50 MG
0.5 TABLET ORAL
Qty: 15 | Refills: 2
Start: 2022-08-30 | End: 2022-11-27

## 2022-08-30 RX ADMIN — Medication 1 TABLET(S): at 09:05

## 2022-08-30 RX ADMIN — Medication 25 MILLIGRAM(S): at 06:05

## 2022-08-30 RX ADMIN — HEPARIN SODIUM 5000 UNIT(S): 5000 INJECTION INTRAVENOUS; SUBCUTANEOUS at 06:06

## 2022-08-30 RX ADMIN — HEPARIN SODIUM 5000 UNIT(S): 5000 INJECTION INTRAVENOUS; SUBCUTANEOUS at 13:47

## 2022-08-30 NOTE — PROGRESS NOTE ADULT - SUBJECTIVE AND OBJECTIVE BOX
*****Structural Heart Team*****    Subjective:        PAST MEDICAL & SURGICAL HISTORY:  No pertinent past medical history    No significant past surgical history    T(C): 36.9 (08-30-22 @ 11:51), Max: 37 (08-29-22 @ 18:40)  HR: 74 (08-30-22 @ 11:51) (74 - 86)  BP: 102/65 (08-30-22 @ 11:51) (92/59 - 115/68)  RR: 18 (08-30-22 @ 11:51) (16 - 18)  SpO2: 98% (08-30-22 @ 11:51) (98% - 100%)  Wt(kg): --  08-29 @ 07:01  -  08-30 @ 07:00  --------------------------------------------------------  IN: 560 mL / OUT: 0 mL / NET: 560 mL    MEDICATIONS  (STANDING):  bisacodyl 5 milliGRAM(s) Oral at bedtime  heparin   Injectable 5000 Unit(s) SubCutaneous every 8 hours  metoprolol succinate ER 25 milliGRAM(s) Oral daily  prenatal multivitamin 1 Tablet(s) Oral daily    MEDICATIONS  (PRN):  polyethylene glycol 3350 17 Gram(s) Oral two times a day PRN Constipation    Home Medications:  Prenatal Multivitamins with Folic Acid 1 mg oral tablet: 1 tab(s) orally once a day (26 Aug 2022 11:48)                       11.0   6.14  )-----------( 276      ( 29 Aug 2022 04:11 )             32.7   08-29    139  |  106  |  11  ----------------------------<  83  3.9   |  24  |  0.70    Ca    8.9      29 Aug 2022 04:11  Phos  3.2     08-29  Mg     1.8     08-29    TPro  6.1  /  Alb  3.6  /  TBili  0.9  /  DBili  x   /  AST  26  /  ALT  21  /  AlkPhos  43  08-29         Subjective  No acute events reported overnight.  Denies any chest pain/tightness/discomfort, light headed sensation, dizziness or palpitations.  No abdominal pain.  Continues to have GERD burning sensation.    Telemetry  Sinus rhythm with no VT/VF or SVT    Review of systems  14 point review of systems otherwise unremarkable except what described above in history of present illness    Physical examination   No apparent distress, alert and oriented 3, appropriate affect   JVD not elevated, supple, no lymphadenopathy   Clear to auscultation bilaterally no wheezing rhonchi crackles  Regular rate and rhythm with no murmur rub or gallop   Positive bowel sound, soft, gravid, no masses guarding or rebound tenderness   No clubbing, cyanosis or edema  Moving all extremity spontaneously  2+ DP/PT pulses no focal deficits    Assessment/plan  Cardiomyopathy  --Unclear etiology of the patient's clinical presentation and discordant findings on transthoracic echo and subsequent cardiac MRI.  The patient anginal chest discomfort has resolved during her hospitalization.  Cardiac MRI on 8/29/2022 demonstrated normal left ventricular size and function.  EF 56%.  Within limitations of noncontrast MRI, no findings to suggest LV noncompaction.  Normal right ventricular size and function.  Normal atrial size.  --Patient is being seen by electrophysiology service.  Plan is for the patient to go home with a 30-day Holter monitor.  Indication for the monitor reviewed.  --Recommend that the patient be assessed as an outpatient by cardiac genetic specialisst to potenitally test for inherited underlying cardiomyopathy.  --The patient appears euvolemic to dry on physical examination.  Encouraged appropriate hydration with the patient.  --The patient's syncopal events appears to be vasovagal.  Per the patient and her  she does not drink adequately.  She does have a history of IBS.  Can discontinue Toprol-XL from cardiology OB perspective.  This was also discussed with heart failure team.  --TTE on 8/26 2022 demonstrated mitral annular calcification.  Minimal mitral valve regurgitation.  No aortic valve regurgitation seen.  Normal trileaflet aortic valve.  Mild–moderate global left ventricular systolic dysfunction.  Increased trabeculation in the left ventricular apex.  The right ventricle is not well visualized.  Grossly normal right ventricular systolic function.  --EKG demonstrates left bundle branch block (no prior EKG was performed or available for review)  --Lower extremity venous duplex study on 8/26/2022 demonstrate no evidence of DVT in either lower extremity.  --Negative for Lyme.  --Recommend compression stockings.  -- Continue telemetry monitoring.  -- Aim for potassium greater than 4 magnesium greater than 2.    Findings and plan were discussed with heart failure/Dr. Gutiérrez.    The patient will be seen as an outpatient by cardiology OB specialist/Dr. Gomez.      PAST MEDICAL & SURGICAL HISTORY:  No pertinent past medical history    No significant past surgical history    T(C): 36.9 (08-30-22 @ 11:51), Max: 37 (08-29-22 @ 18:40)  HR: 74 (08-30-22 @ 11:51) (74 - 86)  BP: 102/65 (08-30-22 @ 11:51) (92/59 - 115/68)  RR: 18 (08-30-22 @ 11:51) (16 - 18)  SpO2: 98% (08-30-22 @ 11:51) (98% - 100%)  Wt(kg): --  08-29 @ 07:01  -  08-30 @ 07:00  --------------------------------------------------------  IN: 560 mL / OUT: 0 mL / NET: 560 mL    MEDICATIONS  (STANDING):  bisacodyl 5 milliGRAM(s) Oral at bedtime  heparin   Injectable 5000 Unit(s) SubCutaneous every 8 hours  metoprolol succinate ER 25 milliGRAM(s) Oral daily  prenatal multivitamin 1 Tablet(s) Oral daily    MEDICATIONS  (PRN):  polyethylene glycol 3350 17 Gram(s) Oral two times a day PRN Constipation    Home Medications:  Prenatal Multivitamins with Folic Acid 1 mg oral tablet: 1 tab(s) orally once a day (26 Aug 2022 11:48)                       11.0   6.14  )-----------( 276      ( 29 Aug 2022 04:11 )             32.7   08-29    139  |  106  |  11  ----------------------------<  83  3.9   |  24  |  0.70    Ca    8.9      29 Aug 2022 04:11  Phos  3.2     08-29  Mg     1.8     08-29    TPro  6.1  /  Alb  3.6  /  TBili  0.9  /  DBili  x   /  AST  26  /  ALT  21  /  AlkPhos  43  08-29

## 2022-08-30 NOTE — PROGRESS NOTE ADULT - SUBJECTIVE AND OBJECTIVE BOX
R3 Antepartum Note, HD#6    Patient seen and examined at bedside, no acute overnight events. No acute complaints. Pt reports +FM, denies LOF, VB, ctx, HA, epigastric pain, blurred vision, CP, SOB, N/V, fevers, and chills.    Vital Signs Last 24 Hours  T(C): 36.9 (08-30-22 @ 03:40), Max: 37 (08-29-22 @ 18:40)  HR: 78 (08-30-22 @ 03:40) (73 - 99)  BP: 93/59 (08-30-22 @ 03:40) (93/59 - 124/56)  RR: 18 (08-30-22 @ 03:40) (16 - 18)  SpO2: 98% (08-30-22 @ 03:40) (98% - 100%)    CAPILLARY BLOOD GLUCOSE          Physical Exam:  General: NAD  Abdomen: Soft, non-tender, gravid  Ext: No pain or swelling      Labs:             11.0   6.14  )-----------( 276      ( 08-29 @ 04:11 )             32.7     08-29 @ 04:11    139  |  106  |  11  ----------------------------<  83  3.9   |  24  |  0.70    Ca    8.9      08-29 @ 04:11  Phos  3.2     08-29 @ 04:11  Mg     1.8     08-29 @ 04:11    TPro  6.1  /  Alb  3.6  /  TBili  0.9  /  DBili  x   /  AST  26  /  ALT  21  /  AlkPhos  43  08-29 @ 04:11            MEDICATIONS  (STANDING):  bisacodyl 5 milliGRAM(s) Oral at bedtime  heparin   Injectable 5000 Unit(s) SubCutaneous every 8 hours  metoprolol succinate ER 25 milliGRAM(s) Oral daily  prenatal multivitamin 1 Tablet(s) Oral daily    MEDICATIONS  (PRN):  polyethylene glycol 3350 17 Gram(s) Oral two times a day PRN Constipation      < from: MR Cardiac No Cont (08.29.22 @ 16:32) >  IMPRESSION:  1. Normal left ventricular size and function. LVEF: 56%. Within   limitations of noncontrast MRI, no findings to suggest LV noncompaction.    2. Normal right ventricular size and function.    3.Normal atrial size.      < end of copied text >

## 2022-08-30 NOTE — DISCHARGE NOTE NURSING/CASE MANAGEMENT/SOCIAL WORK - NSDCPEFALRISK_GEN_ALL_CORE
For information on Fall & Injury Prevention, visit: [email or web address] OR  [email or web address] OR  https://www.cdc.gov/steadi/patient.html

## 2022-08-30 NOTE — PROGRESS NOTE ADULT - ASSESSMENT
32 year old  @ 20+1 weeks (NERISSA: 23) with no medical history who presented to the ED 22 s/p a fall at 3pm. Patient states that at this time she was walking to the top of the stairs when she suddenly began to feel lower abdominal pain, dizzy and like the room was spinning. She felt like she might syncopize so she tried to sit down, and then awoke at the bottom of the stairs. She was found to have new LBBB. Echo with an EF 40-45%, concern for LVNC.    1. Syncope likely vasovagal   2. LBBB    - New LBBB and CP s/p negative cath for CAD/SCAD  - Echo with EF 40-45% and concern for LVNC  - Non con cMRI revealed an EF of 56%, no LV non compaction, nml RV  - Continue Toprol XL 25 mg daily  - Genetic testing with Dr. Mckeon to evaluate for inherited Cardiomyopathy   - Would discharge patient with 30 day Biotel, call [phone] to have placed on day of discharge  - Recommend compression stockings  - Patient to f/u with Dr. Payne as o/p on 22 @ 1:15 PM  - Close telemetry monitoring  - Maintain K>4.0 and Mg>2.0   - Patient can be discharged today from EP perspective  - EP to sign off, reconsult as needed   32 year old  @ 20+1 weeks (NERISSA: 23) with no medical history who presented to the ED 22 s/p a fall at 3pm. Patient states that at this time she was walking to the top of the stairs when she suddenly began to feel lower abdominal pain, dizzy and like the room was spinning. She felt like she might syncopize so she tried to sit down, and then awoke at the bottom of the stairs. She was found to have new LBBB. Echo with an EF 40-45%, concern for LVNC.    1. Syncope likely vasovagal   2. LBBB    - New LBBB and CP s/p negative cath for CAD/SCAD  - Echo with EF 40-45% and concern for LVNC  - Non con cMRI revealed an EF of 56%, no LV non compaction, nml RV  - Can discontinue Toprol XL from EP perspective  - Genetic testing with Dr. Mckeon to evaluate for inherited Cardiomyopathy   - Would discharge patient with 30 day Biotel, call [phone] to have placed on day of discharge  - Recommend compression stockings  - Patient to f/u with Dr. Payne as o/p on 22 @ 1:15 PM  - Close telemetry monitoring  - Maintain K>4.0 and Mg>2.0   - Patient can be discharged today from EP perspective  - EP to sign off, reconsult as needed

## 2022-08-30 NOTE — DISCHARGE NOTE NURSING/CASE MANAGEMENT/SOCIAL WORK - PATIENT PORTAL LINK FT
You can access the FollowMyHealth Patient Portal offered by [facility] by registering at the following website: [email or web address]. By joining [employer]’s FollowMyHealth portal, you will also be able to view your health information using other applications (apps) compatible with our system.

## 2022-08-30 NOTE — PROGRESS NOTE ADULT - ASSESSMENT
33 yo  at 20w6d presenting with a syncopal episode, found with LBBB on ECG. Cath with no significant findings and cardiac MRI wnl, though TTE concerning for reduced EF and trabeculations suspicious for non compaction cardiomyopathy. Unclear diagnosis at this time given conflicting studies. As per the EP team, patient is stable for discharge and plan for Biotel placement. Fetal PACs present and will need Fetal ECHO outpatient.  - Currently on prophylactic anticoagulation with Lovenox 40mg and Metoprolol 25 mg per cardiology, will touch base with heart failure team to determine if that should be continued outpatient  - Plan for maternal MDM in the coming week to discuss care  - Will help coordinate Fetal ECHO    - Appreciate excellent care by heart failure and EP   - Plan for FH prior to discharge today     Carly Hirschberg, MFM Attending   Seen and counseled with DOMITILA Thomas Attending

## 2022-08-30 NOTE — CHART NOTE - NSCHARTNOTEFT_GEN_A_CORE
R3 Chart note    Patient seen at bedside after placement of 30 day Biotel for fetal heartrate check. FH 137bpm and patient denies OB complaints.     Liberty Perez PGY3

## 2022-08-30 NOTE — PROGRESS NOTE ADULT - REASON FOR ADMISSION
chest pain
Syncope and collapse
Syncope and collapse
cardiomyopathy
syncope
chest pain
syncope
Syncope and collapse
Syncope and collapse

## 2022-08-30 NOTE — PROGRESS NOTE ADULT - ASSESSMENT
33yo  @ 20w6d admitted to the antepartum service s/p a fall vs syncopal episode now found to have HFmEF and LVNC. Patient had a fall on 22 at 3pm where she fellt lightheaded and subsequently lost conscious and regained consciousness after falling down 14 carpeted steps. After the fall the patient complained of constant lower back pain as well intermittent lower pelvic pain and was ruled out for PTL. Patient is now s/p neg cardiac cath but with TTE showing HFmEF and LVNC. Patient currently in stable condition, VSS, asymptomatic this AM.     #HFmEF  - LBBB on initial EKG   - Cardiac cath neg  - TTE: EF 40-45% with signs suggestive of LVNC  - Troponins wnl  - Tele monitoring   - Appreciate cardiology recs: con;t Metorpolol 25mg qD  - Cardiac MR (): Wnl, LVEF 56%  - plan for Loop recorder placement      #Maternal well being  - Rhogam administrated  - Regular diet  - PNVs  - Miralax/senna prn   - HSQ for DTV prophylaxis   - FH check after procedure/prior to discharge    Liberty Perez PGY3     31yo  @ 20w6d admitted to the antepartum service s/p a fall vs syncopal episode now found to have HFmEF and LVNC. Patient had a fall on 22 at 3pm where she fellt lightheaded and subsequently lost conscious and regained consciousness after falling down 14 carpeted steps. After the fall the patient complained of constant lower back pain as well intermittent lower pelvic pain and was ruled out for PTL. Patient is now s/p neg cardiac cath but with TTE showing HFmEF and LVNC. Patient currently in stable condition, VSS, asymptomatic this AM.     #HFmEF  - LBBB on initial EKG   - Cardiac cath neg  - TTE: EF 40-45% with signs suggestive of LVNC  - Troponins wnl  - Tele monitoring   - Appreciate cardiology recs: con;t Metorpolol 25mg qD  - Cardiac MR (): Wnl, LVEF 56%  - plan for Loop recorder placement      #Maternal well being  - Rhogam administrated  - Regular diet  - PNVs  - Miralax/senna prn   - HSQ for DTV prophylaxis   - FH check after procedure/prior to discharge    Liberty Perez PGY3    s/p Biotel placement  to fu OB in 2wks  to fu MFM in 4wks(MSL)  to fu cards OB [facility] in 2wks  has fetal echo next tuesday  has EP fu  1:15pm  cont toprol 12.5mg po daily  all plan d/w pt and pt's .   all questions answered.     susan mcbride md

## 2022-08-30 NOTE — PROGRESS NOTE ADULT - SUBJECTIVE AND OBJECTIVE BOX
MFM Consult Note     Patient seen and evaluated at bedside. No complaints this morning. Patient denies chest pain or dizziness. She denies palpitations. Her only concern is constipation which she believes will improve once she is in her normal routine.     ICU Vital Signs Last 24 Hrs  T(C): 36.9 (30 Aug 2022 03:40), Max: 37 (29 Aug 2022 18:40)  T(F): 98.4 (30 Aug 2022 03:40), Max: 98.6 (29 Aug 2022 18:40)  HR: 74 (30 Aug 2022 06:02) (74 - 99)  BP: 92/59 (30 Aug 2022 06:02) (92/59 - 115/68)  BP(mean): 72 (29 Aug 2022 16:00) (72 - 78)  ABP: --  ABP(mean): --  RR: 18 (30 Aug 2022 03:40) (16 - 18)  SpO2: 98% (30 Aug 2022 03:40) (98% - 100%)    O2 Parameters below as of 30 Aug 2022 03:40  Patient On (Oxygen Delivery Method): room air        Gen: NAD   Pulm: nonlabored breathing   CV: RRR   Abd: soft, nontender

## 2022-08-30 NOTE — CHART NOTE - NSCHARTNOTESELECT_GEN_ALL_CORE
CCU Accept Note/Event Note
CCU Transfer/Transfer Note
CCU/Transfer Note
Event Note
Event Note
FH Check
OB chart note
R Radial band removal/Event Note
R femoral sheath removal/Event Note

## 2022-08-30 NOTE — PROGRESS NOTE ADULT - SUBJECTIVE AND OBJECTIVE BOX
24H hour events: No acute events     MEDICATIONS:  heparin   Injectable 5000 Unit(s) SubCutaneous every 8 hours  metoprolol succinate ER 25 milliGRAM(s) Oral daily  bisacodyl 5 milliGRAM(s) Oral at bedtime  polyethylene glycol 3350 17 Gram(s) Oral two times a day PRN  prenatal multivitamin 1 Tablet(s) Oral daily      REVIEW OF SYSTEMS:  Complete 10point ROS negative.    PHYSICAL EXAM:  T(C): 36.9 (08-30-22 @ 03:40), Max: 37 (08-29-22 @ 18:40)  HR: 74 (08-30-22 @ 06:02) (74 - 99)  BP: 92/59 (08-30-22 @ 06:02) (92/59 - 115/68)  RR: 18 (08-30-22 @ 03:40) (16 - 18)  SpO2: 98% (08-30-22 @ 03:40) (98% - 100%)  Wt(kg): --  I&O's Summary    29 Aug 2022 07:01  -  30 Aug 2022 07:00  --------------------------------------------------------  IN: 560 mL / OUT: 0 mL / NET: 560 mL        Appearance: Normal	  Cardiovascular: Normal S1 S2, No JVD, No murmurs, No edema  Respiratory: Lungs clear to auscultation	  Psychiatry: A & O x 3, Mood & affect appropriate  Gastrointestinal:  Soft, Non-tender, + BS	  Skin: No rashes, No ecchymoses, No cyanosis	  Neurologic: Non-focal  Extremities: No clubbing, cyanosis or edema  Vascular: Peripheral pulses palpable 2+ bilaterally        LABS:	 	    CBC Full  -  ( 29 Aug 2022 04:11 )  WBC Count : 6.14 K/uL  Hemoglobin : 11.0 g/dL  Hematocrit : 32.7 %  Platelet Count - Automated : 276 K/uL  Mean Cell Volume : 91.6 fl  Mean Cell Hemoglobin : 30.8 pg  Mean Cell Hemoglobin Concentration : 33.6 gm/dL  Auto Neutrophil # : x  Auto Lymphocyte # : x  Auto Monocyte # : x  Auto Eosinophil # : x  Auto Basophil # : x  Auto Neutrophil % : x  Auto Lymphocyte % : x  Auto Monocyte % : x  Auto Eosinophil % : x  Auto Basophil % : x    08-29    139  |  106  |  11  ----------------------------<  83  3.9   |  24  |  0.70    Ca    8.9      29 Aug 2022 04:11  Phos  3.2     08-29  Mg     1.8     08-29    TPro  6.1  /  Alb  3.6  /  TBili  0.9  /  DBili  x   /  AST  26  /  ALT  21  /  AlkPhos  43  08-29      proBNP: Serum Pro-Brain Natriuretic Peptide: 78 pg/mL (08-25 @ 18:04)      TELEMETRY: SR 60-100s	      < from: Transthoracic Echocardiogram (08.26.22 @ 10:41) >  PROCEDURE: Transthoracic echocardiogram with 2-D, M-Mode  and complete spectral andcolor flow Doppler.  INDICATION: Chronic pulmonary embolism (I27.82)  ------------------------------------------------------------------------  Dimensions:    Normal Values:  LA:     3.0    2.0 - 4.0 cm  Ao:     2.3    2.0 - 3.8 cm  SEPTUM: 0.9    0.6 - 1.2 cm  PWT:    0.9    0.6 - 1.1 cm  LVIDd:  3.9    3.0 - 5.6 cm  LVIDs:  3.2    1.8 - 4.0 cm  Derived variables:  LVMI: 59 g/m2  RWT: 0.46  Fractional short: 18 %  EF (Visual Estimate): 40-45 %  Doppler Peak Velocity (m/sec): AoV=1.5  ------------------------------------------------------------------------  Observations:  Mitral Valve: Mitral annular calcification, otherwise  normal mitral valve. Minimal mitral regurgitation.  Aortic Valve/Aorta: Normal trileaflet aortic valve. Peak  transaortic valve gradient equals 9 mm Hg, estimated aortic  valve area equals 2.3 sqcm. No aortic valve regurgitation  seen. Peak left ventricular outflow tract gradient equals 6  mm Hg, mean gradient is equal to 4 mm Hg.  Aortic Root: 2.3 cm.  LVOT diameter: 1.9 cm.  Left Atrium: LA volume index = 15 cc/m2.  Left Ventricle: Mild-moderate global left ventricular  systolic dysfunction. There is increased trabeculation in  the left ventricular apex, suggestive of LVNC. Consider  further imaging with cMRI.Normal left ventricular  internal dimensions and wall thicknesses. Mild diastolic  dysfunction (Stage I).  Right Heart: Normal right atrium. The right ventricle is  not well visualized; grossly normal right ventricular  systolic function. Normal tricuspid valve. Minimal  tricuspid regurgitation. Normal pulmonic valve. No pulmonic  regurgitation.  Pericardium/Pleura: Normal pericardium with no pericardial  effusion.  ------------------------------------------------------------------------  Conclusions:  1. Mitral annular calcification, otherwise normal mitral  valve. Minimal mitral regurgitation.  2. Normal trileaflet aortic valve. No aortic valve  regurgitation seen.  3. Mild-moderate global left ventricular systolic  dysfunction. There is increased trabeculation in the left  ventricular apex, suggestive of LVNC. Consider further  imaging with cMRI.  4. The right ventricle is not well visualized; grossly  normal right ventricular systolic function.  *** No previous Echo exam.    < end of copied text >      < from: MR Cardiac No Cont (08.29.22 @ 16:32) >  NTERPRETATION:  INDICATION: 20 weeks pregnant with syncopal episode, new   left bundle branch block, assess for LV noncompaction or other   cardiomyopathy    TECHNIQUE: Multi-sequential, multi-planar cardiac MRI was performed   without the use of intravenous contrast. Post-processing included volume   calculations , which were performed by the interpreting physicians on an   independentworkstation. Trabeculae and papillary muscles were included   in the blood pool and excluded from the LV mass.    COMPARISON: None    FINDINGS:    Aorta: The visualized thoracic aorta is unremarkable.    Pulmonary artery: The main pulmonary artery isnormal in size.    Systemic and pulmonary venous return: Conventional.    Cardiac Chambers: The cardiac chambers demonstrate normal   atrioventricular and ventriculoarterial concordance.    Coronaries: Not specifically imaged.    Left Ventricle: The left ventricular size is normal. The left ventricular   systolic function is globally normal. No segmental wall motion   abnormalities are seen. There is normal myocardial thickness throughout   the left ventricle. T2-weighted imaging demonstrates no high signal   intensity to suggest the presence of myocardial edema.    Absolute Indexed to BSA, height: 160 cm, weight: 75 kg. Mosteller BSA   method .  LVEDV: 95 mL; LVEDVI: 52 mL/m2  LVESV: 42 mL; LVESVI: 23 mL/m2  LVSV: 53 mL; LVSVI: 29 mL/m2  LVEF: 56 %  NERISSA: 42 mm  ESD: 29 mm  Basal anteroseptal wall: 8 mm  Basal inferolateral wall: 7 mm  LV mass: 72 g; LV mass indexed: 39 g/m2  Cardiac output: 5.2 l/min; Cardiac index: 2.9 l/min/m2    Right Ventricle: The right ventricular size is normal. The right systolic   ventricular function is globally normal. No segmental wall motion   abnormalities or aneurysms are seen.    Absolute Indexed to BSA  RVEDV: 71 mL; RVEDVI: 39 mL/m2  RVESV: 18 mL; RVESVI: 10 ml/m2  RVSV: 53 mL; RVSVI: 29 mL/m2  RVEF: 74 %    Left atrium: The left atrium is normal in size, measuring 19 square   centimeters on the 4 chamber view.    Right atrium: The right atrium is normal in size, measuring 10 square   centimeters on the 4 chamber view.    Aortic valve: No significant aortic insufficiency.    Pulmonic valve: Not specifically assessed.    Mitral valve: No significant mitral stenosis or regurgitation.    Tricuspid valve: No significant tricuspid stenosis or regurgitation.    Pericardium: No pericardial effusion.    Noncardiac findings: Gravid uterus.    IMPRESSION:  1. Normal left ventricular size and function. LVEF: 56%. Within   limitations of noncontrast MRI, no findings to suggest LV noncompaction.    2. Normal right ventricular size and function.    3.Normal atrial size.    < end of copied text >  < from: MR Cardiac No Cont (08.29.22 @ 16:32) >  NTERPRETATION:  INDICATION: 20 weeks pregnant with syncopal episode, new   left bundle branch block, assess for LV noncompaction or other   cardiomyopathy    TECHNIQUE: Multi-sequential, multi-planar cardiac MRI was performed   without the use of intravenous contrast. Post-processing included volume   calculations , which were performed by the interpreting physicians on an   independentworkstation. Trabeculae and papillary muscles were included   in the blood pool and excluded from the LV mass.    COMPARISON: None    FINDINGS:    Aorta: The visualized thoracic aorta is unremarkable.    Pulmonary artery: The main pulmonary artery isnormal in size.    Systemic and pulmonary venous return: Conventional.    Cardiac Chambers: The cardiac chambers demonstrate normal   atrioventricular and ventriculoarterial concordance.    Coronaries: Not specifically imaged.    Left Ventricle: The left ventricular size is normal. The left ventricular   systolic function is globally normal. No segmental wall motion   abnormalities are seen. There is normal myocardial thickness throughout   the left ventricle. T2-weighted imaging demonstrates no high signal   intensity to suggest the presence of myocardial edema.    Absolute Indexed to BSA, height: 160 cm, weight: 75 kg. Mosteller BSA   method .  LVEDV: 95 mL; LVEDVI: 52 mL/m2  LVESV: 42 mL; LVESVI: 23 mL/m2  LVSV: 53 mL; LVSVI: 29 mL/m2  LVEF: 56 %  NERISSA: 42 mm  ESD: 29 mm  Basal anteroseptal wall: 8 mm  Basal inferolateral wall: 7 mm  LV mass: 72 g; LV mass indexed: 39 g/m2  Cardiac output: 5.2 l/min; Cardiac index: 2.9 l/min/m2    Right Ventricle: The right ventricular size is normal. The right systolic   ventricular function is globally normal. No segmental wall motion   abnormalities or aneurysms are seen.    Absolute Indexed to BSA  RVEDV: 71 mL; RVEDVI: 39 mL/m2  RVESV: 18 mL; RVESVI: 10 ml/m2  RVSV: 53 mL; RVSVI: 29 mL/m2  RVEF: 74 %    Left atrium: The left atrium is normal in size, measuring 19 square   centimeters on the 4 chamber view.    Right atrium: The right atrium is normal in size, measuring 10 square   centimeters on the 4 chamber view.    Aortic valve: No significant aortic insufficiency.    Pulmonic valve: Not specifically assessed.    Mitral valve: No significant mitral stenosis or regurgitation.    Tricuspid valve: No significant tricuspid stenosis or regurgitation.    Pericardium: No pericardial effusion.    Noncardiac findings: Gravid uterus.    IMPRESSION:  1. Normal left ventricular size and function. LVEF: 56%. Within   limitations of noncontrast MRI, no findings to suggest LV noncompaction.    2. Normal right ventricular size and function.    3.Normal atrial size.    < end of copied text >

## 2022-08-31 ENCOUNTER — OUTPATIENT (OUTPATIENT)
Dept: OUTPATIENT SERVICES | Age: 32
LOS: 1 days | Discharge: HOME | End: 2022-08-31

## 2022-08-31 ENCOUNTER — APPOINTMENT (OUTPATIENT)
Dept: PEDIATRIC CARDIOLOGY | Facility: CLINIC | Age: 32
End: 2022-08-31

## 2022-08-31 PROCEDURE — 76825 ECHO EXAM OF FETAL HEART: CPT

## 2022-08-31 PROCEDURE — 99204 OFFICE O/P NEW MOD 45 MIN: CPT | Mod: 25

## 2022-08-31 PROCEDURE — 76820 UMBILICAL ARTERY ECHO: CPT

## 2022-08-31 PROCEDURE — 76827 ECHO EXAM OF FETAL HEART: CPT

## 2022-08-31 PROCEDURE — 93325 DOPPLER ECHO COLOR FLOW MAPG: CPT | Mod: 59

## 2022-09-01 PROBLEM — Z00.00 ENCOUNTER FOR PREVENTIVE HEALTH EXAMINATION: Noted: 2022-09-01

## 2022-09-06 ENCOUNTER — APPOINTMENT (OUTPATIENT)
Dept: PEDIATRIC CARDIOLOGY | Facility: CLINIC | Age: 32
End: 2022-09-06

## 2022-09-12 ENCOUNTER — APPOINTMENT (OUTPATIENT)
Dept: PEDIATRIC CARDIOLOGY | Facility: CLINIC | Age: 32
End: 2022-09-12

## 2022-09-20 ENCOUNTER — APPOINTMENT (OUTPATIENT)
Dept: CARDIOLOGY | Facility: CLINIC | Age: 32
End: 2022-09-20

## 2022-09-20 ENCOUNTER — NON-APPOINTMENT (OUTPATIENT)
Age: 32
End: 2022-09-20

## 2022-09-20 VITALS
SYSTOLIC BLOOD PRESSURE: 125 MMHG | HEART RATE: 79 BPM | OXYGEN SATURATION: 95 % | BODY MASS INDEX: 19.49 KG/M2 | HEIGHT: 63 IN | DIASTOLIC BLOOD PRESSURE: 82 MMHG | WEIGHT: 110 LBS

## 2022-09-20 DIAGNOSIS — Z87.19 PERSONAL HISTORY OF OTHER DISEASES OF THE DIGESTIVE SYSTEM: ICD-10-CM

## 2022-09-20 PROCEDURE — 99214 OFFICE O/P EST MOD 30 MIN: CPT | Mod: 25

## 2022-09-20 PROCEDURE — 93000 ELECTROCARDIOGRAM COMPLETE: CPT

## 2022-09-21 NOTE — HISTORY OF PRESENT ILLNESS
[FreeTextEntry1] : Edilma is a 32-year-old female 23 weeks gestation first pregnancy s/p syncope at top of stairs. She had cardiac cath secondary to LBBB and abnormal echo 40-45% with increase trabeculation apex and mother who had MI in past. Cath normal and MRI normal \par Dizziness happens a lot when hot outside. Near syncope before pregnancy. THis time in the house went up and down the stairs but forgot her glasses so rushed up the second time when got dizzy and syncope.

## 2022-09-21 NOTE — DISCUSSION/SUMMARY
[EKG obtained to assist in diagnosis and management of assessed problem(s)] : EKG obtained to assist in diagnosis and management of assessed problem(s) [FreeTextEntry1] : The patient is a 32-year-old female 23 weeks gestation with episode of syncope.\par #1 CV- records reviewed, ECHO ef40%, normal Cath, normal MRI, Biotel in place for another week\par #2 Dizziness- symptoms c/w orthostasis, compression stockings, increase hydration and agree with low dose beta blocker for both orthostasis and possible arrhythmia. Repeat echo prior to delivery. \par #3 Ob- fetal echo normal, 23 weeks first pregnancy

## 2022-09-21 NOTE — PHYSICAL EXAM

## 2022-09-30 ENCOUNTER — APPOINTMENT (OUTPATIENT)
Dept: ELECTROPHYSIOLOGY | Facility: CLINIC | Age: 32
End: 2022-09-30

## 2022-09-30 VITALS — SYSTOLIC BLOOD PRESSURE: 112 MMHG | HEART RATE: 82 BPM | DIASTOLIC BLOOD PRESSURE: 70 MMHG

## 2022-09-30 PROCEDURE — 93000 ELECTROCARDIOGRAM COMPLETE: CPT

## 2022-09-30 PROCEDURE — 99214 OFFICE O/P EST MOD 30 MIN: CPT | Mod: 25

## 2022-09-30 NOTE — END OF VISIT
[Time Spent: ___ minutes] : I have spent [unfilled] minutes of time on the encounter. [FreeTextEntry3] : I personally performed the history and physical exam and formulated the medical decision making in this patient. I was responsible for more than 50% of the work of this encounter.\par

## 2022-09-30 NOTE — HISTORY OF PRESENT ILLNESS
[FreeTextEntry1] : Ms. Edilma Terrell is a 32 year old female  (NERISSA: 23) with no medical history, who presented to [facility] ED on 22 s/p fall that afternoon.  At the time of the fall, she was walking to the top of the stairs when she suddenly began to feel lower abdominal pain, dizziness, and a sensation of "the room spinning".  She felt presyncopal and tried to sit down, and then awoke at the bottom of the stairs.  During her hospitalization, she was found with new LBBB.  TTE with EF 40-45% and concern for LVNC.  LHC negative for CAD/SCD.  Non contrast cMRI revealed EF of 56%, no LV noncompaction, an RV RNL.  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  She does report some ongoing wrist soreness since his LHC on 22, with occasional numbness/tingling of the fingers.  30 day biotel with no significant arrhythmia, and no heart block.  ECG shows sinus with LBBB at 87bpm.

## 2022-09-30 NOTE — PHYSICAL EXAM
[Well Developed] : well developed [Well Nourished] : well nourished [No Acute Distress] : no acute distress [Normal Conjunctiva] : normal conjunctiva [Normal Venous Pressure] : normal venous pressure [No Carotid Bruit] : no carotid bruit [Normal S1, S2] : normal S1, S2 [No Murmur] : no murmur [No Rub] : no rub [No Gallop] : no gallop [Clear Lung Fields] : clear lung fields [Good Air Entry] : good air entry [No Respiratory Distress] : no respiratory distress  [Soft] : abdomen soft [Non Tender] : non-tender [No Masses/organomegaly] : no masses/organomegaly [Normal Bowel Sounds] : normal bowel sounds [Normal Gait] : normal gait [No Edema] : no edema [No Cyanosis] : no cyanosis [No Clubbing] : no clubbing [No Varicosities] : no varicosities [No Rash] : no rash [No Skin Lesions] : no skin lesions [Moves all extremities] : moves all extremities [No Focal Deficits] : no focal deficits [Normal Speech] : normal speech [Alert and Oriented] : alert and oriented [Normal memory] : normal memory [de-identified] : pregnant [de-identified] : Appears consistent with stage of gestation [de-identified] : right wrist tenderness, jose test with good arterial blood flow radial > ulnar

## 2022-09-30 NOTE — DISCUSSION/SUMMARY
[EKG obtained to assist in diagnosis and management of assessed problem(s)] : EKG obtained to assist in diagnosis and management of assessed problem(s) [FreeTextEntry1] : Edilma is doing well, without any further presyncopal or syncopal events.  We reviewed lifestyle modifications to avoid further vasovagal episodes.  She will remain on metoprolol succinate (12.5mg daily which is safe for pregnancy and lactation).  She does have some tenderness at the right radial cath site, Trell test with good arterial blood flow.  Recommended follow up with Dr. Caryn Nation to evaluate continued right radial pain and  tenderness post cath.  She will follow up with us after her delivery.

## 2022-09-30 NOTE — REASON FOR VISIT
[Symptom and Test Evaluation] : symptom and test evaluation [Arrhythmia/ECG Abnorrmalities] : arrhythmia/ECG abnormalities [FreeTextEntry3] : Guerita Patrick MD [FreeTextEntry1] : OB: Dr. Xuan Wei\par [address], [address]\[place] [address]\par P: [phone]\par F: [phone]

## 2022-10-18 ENCOUNTER — APPOINTMENT (OUTPATIENT)
Dept: CARDIOLOGY | Facility: CLINIC | Age: 32
End: 2022-10-18

## 2022-10-18 ENCOUNTER — NON-APPOINTMENT (OUTPATIENT)
Age: 32
End: 2022-10-18

## 2022-10-18 VITALS
SYSTOLIC BLOOD PRESSURE: 113 MMHG | BODY MASS INDEX: 20.2 KG/M2 | HEIGHT: 63 IN | HEART RATE: 87 BPM | WEIGHT: 114 LBS | OXYGEN SATURATION: 100 % | DIASTOLIC BLOOD PRESSURE: 72 MMHG

## 2022-10-18 PROCEDURE — 99213 OFFICE O/P EST LOW 20 MIN: CPT | Mod: 25

## 2022-10-18 PROCEDURE — 93000 ELECTROCARDIOGRAM COMPLETE: CPT

## 2022-10-19 NOTE — DISCUSSION/SUMMARY
[FreeTextEntry1] : The patient is a 32-year-old female 23 weeks gestation, syncope who has improved\par #1 CV- records reviewed, ECHO ef40%, normal Cath, normal MRI, Biotel negative\par #2 Dizziness- symptoms c/w orthostasis, compression stockings, increase hydration and metoprolol. Repeat echo prior to delivery. \par #3 Ob- fetal echo normal, 23 weeks first pregnancy [EKG obtained to assist in diagnosis and management of assessed problem(s)] : EKG obtained to assist in diagnosis and management of assessed problem(s)

## 2022-10-19 NOTE — PHYSICAL EXAM

## 2022-10-19 NOTE — HISTORY OF PRESENT ILLNESS
[FreeTextEntry1] : Edilma is feeling well with no further syncope. Tolerating toprol and it is helping.

## 2022-11-15 ENCOUNTER — APPOINTMENT (OUTPATIENT)
Dept: CARDIOLOGY | Facility: CLINIC | Age: 32
End: 2022-11-15

## 2022-11-15 VITALS
HEIGHT: 63 IN | BODY MASS INDEX: 21.26 KG/M2 | DIASTOLIC BLOOD PRESSURE: 68 MMHG | HEART RATE: 89 BPM | WEIGHT: 120 LBS | OXYGEN SATURATION: 100 % | SYSTOLIC BLOOD PRESSURE: 107 MMHG

## 2022-11-15 PROCEDURE — 99213 OFFICE O/P EST LOW 20 MIN: CPT | Mod: 25

## 2022-11-15 PROCEDURE — 93000 ELECTROCARDIOGRAM COMPLETE: CPT

## 2022-11-16 NOTE — DISCUSSION/SUMMARY
[FreeTextEntry1] : The patient is a 32-year-old female 32 weeks gestation, syncope who has improved\par #1 CV- records reviewed, ECHO ef40%, normal Cath, normal MRI, Biotel negative, ECHO ordered\par #2 Dizziness- symptoms c/w orthostasis, compression stockings, c/w  hydration and metoprolol. \par #3 Ob- fetal echo normal, 32 weeks first pregnancy, no current indication for post delivery monitoring unless something changes [EKG obtained to assist in diagnosis and management of assessed problem(s)] : EKG obtained to assist in diagnosis and management of assessed problem(s)

## 2022-11-16 NOTE — PHYSICAL EXAM

## 2022-11-16 NOTE — HISTORY OF PRESENT ILLNESS
[FreeTextEntry1] : Edilma has been feeling well with no palpitations, sob or dizziness. just uncomfortable.

## 2022-12-06 ENCOUNTER — APPOINTMENT (OUTPATIENT)
Dept: CARDIOLOGY | Facility: CLINIC | Age: 32
End: 2022-12-06

## 2022-12-07 ENCOUNTER — APPOINTMENT (OUTPATIENT)
Dept: CARDIOLOGY | Facility: CLINIC | Age: 32
End: 2022-12-07

## 2022-12-07 PROCEDURE — 93306 TTE W/DOPPLER COMPLETE: CPT

## 2022-12-29 ENCOUNTER — NON-APPOINTMENT (OUTPATIENT)
Age: 32
End: 2022-12-29

## 2022-12-29 ENCOUNTER — APPOINTMENT (OUTPATIENT)
Dept: CARDIOLOGY | Facility: CLINIC | Age: 32
End: 2022-12-29
Payer: COMMERCIAL

## 2022-12-29 VITALS
HEIGHT: 63 IN | BODY MASS INDEX: 22.15 KG/M2 | WEIGHT: 125 LBS | SYSTOLIC BLOOD PRESSURE: 120 MMHG | OXYGEN SATURATION: 100 % | DIASTOLIC BLOOD PRESSURE: 76 MMHG | HEART RATE: 64 BPM

## 2022-12-29 DIAGNOSIS — I44.7 LEFT BUNDLE-BRANCH BLOCK, UNSPECIFIED: ICD-10-CM

## 2022-12-29 PROCEDURE — 99213 OFFICE O/P EST LOW 20 MIN: CPT | Mod: 25

## 2022-12-29 PROCEDURE — 93000 ELECTROCARDIOGRAM COMPLETE: CPT

## 2022-12-30 PROBLEM — I44.7 LEFT BUNDLE BRANCH BLOCK (LBBB): Status: ACTIVE | Noted: 2022-09-19

## 2022-12-30 NOTE — HISTORY OF PRESENT ILLNESS
[FreeTextEntry1] : Edilma is very uncomfortable and anxious to deliver. Otherwise no palpitations, dizziness or SOB.

## 2022-12-30 NOTE — PHYSICAL EXAM

## 2022-12-30 NOTE — DISCUSSION/SUMMARY
[FreeTextEntry1] : The patient is a 32-year-old female 38 weeks gestation, syncope who is stable\par #1 CV- records reviewed, ECHO unchanged with EF=40%, normal Cath, normal MRI, Biotel negative\par #2 Dizziness- symptoms c/w orthostasis, compression stockings, c/w  hydration and metoprolol. \par #3 Ob- fetal echo normal, 32 weeks first pregnancy, no current indication for post delivery monitoring unless something changes [EKG obtained to assist in diagnosis and management of assessed problem(s)] : EKG obtained to assist in diagnosis and management of assessed problem(s)

## 2023-01-05 ENCOUNTER — INPATIENT (INPATIENT)
Facility: HOSPITAL | Age: 33
LOS: 1 days | Discharge: HOME | End: 2023-01-07
Attending: OBSTETRICS & GYNECOLOGY | Admitting: OBSTETRICS & GYNECOLOGY
Payer: COMMERCIAL

## 2023-01-05 VITALS — HEART RATE: 83 BPM | DIASTOLIC BLOOD PRESSURE: 88 MMHG | SYSTOLIC BLOOD PRESSURE: 138 MMHG

## 2023-01-05 DIAGNOSIS — Z34.80 ENCOUNTER FOR SUPERVISION OF OTHER NORMAL PREGNANCY, UNSPECIFIED TRIMESTER: ICD-10-CM

## 2023-01-05 DIAGNOSIS — Z3A.00 WEEKS OF GESTATION OF PREGNANCY NOT SPECIFIED: ICD-10-CM

## 2023-01-05 DIAGNOSIS — Z86.79 PERSONAL HISTORY OF OTHER DISEASES OF THE CIRCULATORY SYSTEM: ICD-10-CM

## 2023-01-05 DIAGNOSIS — O26.899 OTHER SPECIFIED PREGNANCY RELATED CONDITIONS, UNSPECIFIED TRIMESTER: ICD-10-CM

## 2023-01-05 LAB
ALBUMIN SERPL ELPH-MCNC: 4 G/DL — SIGNIFICANT CHANGE UP (ref 3.3–5)
ALP SERPL-CCNC: 204 U/L — HIGH (ref 40–120)
ALT FLD-CCNC: 9 U/L — LOW (ref 10–45)
ANION GAP SERPL CALC-SCNC: 13 MMOL/L — SIGNIFICANT CHANGE UP (ref 5–17)
APPEARANCE UR: CLEAR — SIGNIFICANT CHANGE UP
APTT BLD: 26.9 SEC — LOW (ref 27.5–35.5)
AST SERPL-CCNC: 21 U/L — SIGNIFICANT CHANGE UP (ref 10–40)
BASOPHILS # BLD AUTO: 0.02 K/UL — SIGNIFICANT CHANGE UP (ref 0–0.2)
BASOPHILS NFR BLD AUTO: 0.2 % — SIGNIFICANT CHANGE UP (ref 0–2)
BILIRUB SERPL-MCNC: 0.9 MG/DL — SIGNIFICANT CHANGE UP (ref 0.2–1.2)
BILIRUB UR-MCNC: NEGATIVE — SIGNIFICANT CHANGE UP
BLD GP AB SCN SERPL QL: POSITIVE — SIGNIFICANT CHANGE UP
BUN SERPL-MCNC: 11 MG/DL — SIGNIFICANT CHANGE UP (ref 7–23)
CALCIUM SERPL-MCNC: 9.4 MG/DL — SIGNIFICANT CHANGE UP (ref 8.4–10.5)
CHLORIDE SERPL-SCNC: 103 MMOL/L — SIGNIFICANT CHANGE UP (ref 96–108)
CO2 SERPL-SCNC: 21 MMOL/L — LOW (ref 22–31)
COLOR SPEC: SIGNIFICANT CHANGE UP
COVID-19 SPIKE DOMAIN AB INTERP: POSITIVE
COVID-19 SPIKE DOMAIN ANTIBODY RESULT: >250 U/ML — HIGH
CREAT ?TM UR-MCNC: 91 MG/DL — SIGNIFICANT CHANGE UP
CREAT SERPL-MCNC: 0.74 MG/DL — SIGNIFICANT CHANGE UP (ref 0.5–1.3)
DIFF PNL FLD: ABNORMAL
EGFR: 112 ML/MIN/1.73M2 — SIGNIFICANT CHANGE UP
EOSINOPHIL # BLD AUTO: 0 K/UL — SIGNIFICANT CHANGE UP (ref 0–0.5)
EOSINOPHIL NFR BLD AUTO: 0 % — SIGNIFICANT CHANGE UP (ref 0–6)
GLUCOSE SERPL-MCNC: 81 MG/DL — SIGNIFICANT CHANGE UP (ref 70–99)
GLUCOSE UR QL: NEGATIVE — SIGNIFICANT CHANGE UP
HCT VFR BLD CALC: 41.8 % — SIGNIFICANT CHANGE UP (ref 34.5–45)
HGB BLD-MCNC: 13.2 G/DL — SIGNIFICANT CHANGE UP (ref 11.5–15.5)
IMM GRANULOCYTES NFR BLD AUTO: 0.5 % — SIGNIFICANT CHANGE UP (ref 0–0.9)
INR BLD: 0.85 RATIO — LOW (ref 0.88–1.16)
KETONES UR-MCNC: NEGATIVE — SIGNIFICANT CHANGE UP
LDH SERPL L TO P-CCNC: 174 U/L — SIGNIFICANT CHANGE UP (ref 50–242)
LEUKOCYTE ESTERASE UR-ACNC: ABNORMAL
LYMPHOCYTES # BLD AUTO: 0.89 K/UL — LOW (ref 1–3.3)
LYMPHOCYTES # BLD AUTO: 8 % — LOW (ref 13–44)
MAGNESIUM SERPL-MCNC: 1.9 MG/DL — SIGNIFICANT CHANGE UP (ref 1.6–2.6)
MCHC RBC-ENTMCNC: 28.3 PG — SIGNIFICANT CHANGE UP (ref 27–34)
MCHC RBC-ENTMCNC: 31.6 GM/DL — LOW (ref 32–36)
MCV RBC AUTO: 89.5 FL — SIGNIFICANT CHANGE UP (ref 80–100)
MONOCYTES # BLD AUTO: 0.56 K/UL — SIGNIFICANT CHANGE UP (ref 0–0.9)
MONOCYTES NFR BLD AUTO: 5.1 % — SIGNIFICANT CHANGE UP (ref 2–14)
NEUTROPHILS # BLD AUTO: 9.56 K/UL — HIGH (ref 1.8–7.4)
NEUTROPHILS NFR BLD AUTO: 86.2 % — HIGH (ref 43–77)
NITRITE UR-MCNC: NEGATIVE — SIGNIFICANT CHANGE UP
NRBC # BLD: 0 /100 WBCS — SIGNIFICANT CHANGE UP (ref 0–0)
PH UR: 6.5 — SIGNIFICANT CHANGE UP (ref 5–8)
PHOSPHATE SERPL-MCNC: 2.8 MG/DL — SIGNIFICANT CHANGE UP (ref 2.5–4.5)
PLATELET # BLD AUTO: 302 K/UL — SIGNIFICANT CHANGE UP (ref 150–400)
POTASSIUM SERPL-MCNC: 4.4 MMOL/L — SIGNIFICANT CHANGE UP (ref 3.5–5.3)
POTASSIUM SERPL-SCNC: 4.4 MMOL/L — SIGNIFICANT CHANGE UP (ref 3.5–5.3)
PROT ?TM UR-MCNC: 12 MG/DL — SIGNIFICANT CHANGE UP (ref 0–12)
PROT SERPL-MCNC: 7.3 G/DL — SIGNIFICANT CHANGE UP (ref 6–8.3)
PROT UR-MCNC: SIGNIFICANT CHANGE UP
PROT/CREAT UR-RTO: 0.1 RATIO — SIGNIFICANT CHANGE UP (ref 0–0.2)
PROTHROM AB SERPL-ACNC: 9.8 SEC — LOW (ref 10.5–13.4)
RBC # BLD: 4.67 M/UL — SIGNIFICANT CHANGE UP (ref 3.8–5.2)
RBC # FLD: 14.8 % — HIGH (ref 10.3–14.5)
RH IG SCN BLD-IMP: NEGATIVE — SIGNIFICANT CHANGE UP
SARS-COV-2 IGG+IGM SERPL QL IA: >250 U/ML — HIGH
SARS-COV-2 IGG+IGM SERPL QL IA: POSITIVE
SARS-COV-2 RNA SPEC QL NAA+PROBE: SIGNIFICANT CHANGE UP
SODIUM SERPL-SCNC: 137 MMOL/L — SIGNIFICANT CHANGE UP (ref 135–145)
SP GR SPEC: 1.02 — SIGNIFICANT CHANGE UP (ref 1.01–1.02)
T PALLIDUM AB TITR SER: NEGATIVE — SIGNIFICANT CHANGE UP
URATE SERPL-MCNC: 6.8 MG/DL — SIGNIFICANT CHANGE UP (ref 2.5–7)
UROBILINOGEN FLD QL: NEGATIVE — SIGNIFICANT CHANGE UP
WBC # BLD: 11.08 K/UL — HIGH (ref 3.8–10.5)
WBC # FLD AUTO: 11.08 K/UL — HIGH (ref 3.8–10.5)

## 2023-01-05 PROCEDURE — 99255 IP/OBS CONSLTJ NEW/EST HI 80: CPT

## 2023-01-05 PROCEDURE — 86077 PHYS BLOOD BANK SERV XMATCH: CPT

## 2023-01-05 RX ORDER — CHLORHEXIDINE GLUCONATE 213 G/1000ML
1 SOLUTION TOPICAL ONCE
Refills: 0 | Status: DISCONTINUED | OUTPATIENT
Start: 2023-01-05 | End: 2023-01-06

## 2023-01-05 RX ORDER — FAMOTIDINE 10 MG/ML
20 INJECTION INTRAVENOUS ONCE
Refills: 0 | Status: COMPLETED | OUTPATIENT
Start: 2023-01-05 | End: 2023-01-05

## 2023-01-05 RX ORDER — AMPICILLIN TRIHYDRATE 250 MG
2 CAPSULE ORAL ONCE
Refills: 0 | Status: COMPLETED | OUTPATIENT
Start: 2023-01-05 | End: 2023-01-05

## 2023-01-05 RX ORDER — OXYTOCIN 10 UNIT/ML
4 VIAL (ML) INJECTION
Qty: 30 | Refills: 0 | Status: DISCONTINUED | OUTPATIENT
Start: 2023-01-05 | End: 2023-01-07

## 2023-01-05 RX ORDER — AMPICILLIN TRIHYDRATE 250 MG
1 CAPSULE ORAL EVERY 4 HOURS
Refills: 0 | Status: DISCONTINUED | OUTPATIENT
Start: 2023-01-05 | End: 2023-01-06

## 2023-01-05 RX ORDER — METOPROLOL TARTRATE 50 MG
12.5 TABLET ORAL DAILY
Refills: 0 | Status: DISCONTINUED | OUTPATIENT
Start: 2023-01-05 | End: 2023-01-07

## 2023-01-05 RX ORDER — SODIUM CHLORIDE 9 MG/ML
1000 INJECTION, SOLUTION INTRAVENOUS
Refills: 0 | Status: DISCONTINUED | OUTPATIENT
Start: 2023-01-05 | End: 2023-01-05

## 2023-01-05 RX ORDER — OXYTOCIN 10 UNIT/ML
333.33 VIAL (ML) INJECTION
Qty: 20 | Refills: 0 | Status: DISCONTINUED | OUTPATIENT
Start: 2023-01-05 | End: 2023-01-06

## 2023-01-05 RX ORDER — METOPROLOL TARTRATE 50 MG
25 TABLET ORAL DAILY
Refills: 0 | Status: DISCONTINUED | OUTPATIENT
Start: 2023-01-05 | End: 2023-01-05

## 2023-01-05 RX ORDER — CITRIC ACID/SODIUM CITRATE 300-500 MG
15 SOLUTION, ORAL ORAL EVERY 6 HOURS
Refills: 0 | Status: DISCONTINUED | OUTPATIENT
Start: 2023-01-05 | End: 2023-01-06

## 2023-01-05 RX ORDER — ONDANSETRON 8 MG/1
4 TABLET, FILM COATED ORAL ONCE
Refills: 0 | Status: COMPLETED | OUTPATIENT
Start: 2023-01-05 | End: 2023-01-05

## 2023-01-05 RX ORDER — SODIUM CHLORIDE 9 MG/ML
1000 INJECTION, SOLUTION INTRAVENOUS
Refills: 0 | Status: DISCONTINUED | OUTPATIENT
Start: 2023-01-05 | End: 2023-01-07

## 2023-01-05 RX ADMIN — Medication 200 GRAM(S): at 10:25

## 2023-01-05 RX ADMIN — Medication 12.5 MILLIGRAM(S): at 19:57

## 2023-01-05 RX ADMIN — ONDANSETRON 4 MILLIGRAM(S): 8 TABLET, FILM COATED ORAL at 12:37

## 2023-01-05 RX ADMIN — Medication 108 GRAM(S): at 14:27

## 2023-01-05 RX ADMIN — Medication 15 MILLILITER(S): at 13:36

## 2023-01-05 RX ADMIN — SODIUM CHLORIDE 75 MILLILITER(S): 9 INJECTION, SOLUTION INTRAVENOUS at 09:55

## 2023-01-05 RX ADMIN — Medication 4 MILLIUNIT(S)/MIN: at 14:23

## 2023-01-05 RX ADMIN — Medication 108 GRAM(S): at 18:27

## 2023-01-05 RX ADMIN — FAMOTIDINE 20 MILLIGRAM(S): 10 INJECTION INTRAVENOUS at 16:59

## 2023-01-05 RX ADMIN — SODIUM CHLORIDE 100 MILLILITER(S): 9 INJECTION, SOLUTION INTRAVENOUS at 22:18

## 2023-01-05 RX ADMIN — Medication 108 GRAM(S): at 22:18

## 2023-01-05 NOTE — OB PROVIDER LABOR PROGRESS NOTE - ASSESSMENT
pt with inc pain   VE /-1   reassuring FHRT   for epidural reinforcement   cont current care   anticipate    M. Oppenheim, MD

## 2023-01-05 NOTE — CONSULT NOTE ADULT - ATTENDING COMMENTS
31 y/o F with mild LV dysfunction, now in labor. Appears well-compensated from a cardiac perspective. Please limit IVF hydration. Will follow closely in the uri-partum period to assess for volume overload.

## 2023-01-05 NOTE — OB RN PATIENT PROFILE - ALERT: PERTINENT HISTORY
1st Trimester Sonogram/BioPhysical Profile(s)/Follow up Sonogram for Growth/Fetal Non-Stress Test (NST)/Ultra Screen at 12 Weeks

## 2023-01-05 NOTE — OB PROVIDER H&P - PRO INTERPRETER NEED 2
Quality 226: Preventive Care And Screening: Tobacco Use: Screening And Cessation Intervention: Patient screened for tobacco use and is an ex/non-smoker
Detail Level: Detailed
English

## 2023-01-05 NOTE — OB PROVIDER H&P - NSHPPHYSICALEXAM_GEN_ALL_CORE
Gen: NAD, A&O x 4  Pulm: nonlabor breathing  Heart: RRR  Abd: soft, gravid  VE: 2.5/70%/-3    BSUS: vertex

## 2023-01-05 NOTE — OB PROVIDER H&P - NSICDXPASTMEDICALHX_GEN_ALL_CORE_FT
PAST MEDICAL HISTORY:  GBS (group B streptococcus) infection     H/O cardiomyopathy     Heart failure     Left bundle branch block (LBBB)

## 2023-01-05 NOTE — PRE-ANESTHESIA EVALUATION ADULT - NSANTHADDINFOFT_GEN_ALL_CORE
Risks and benefits of neuraxial anesthesia discussed including bleeding, infection, nerve damage, cardiopulmonary complications, headache, incomplete block, necessity of GA.  To be monitored with telemetry.  Possibility of invasive monitoring discussed if decompensates during peripartum period.

## 2023-01-05 NOTE — OB PROVIDER LABOR PROGRESS NOTE - NS_SUBJECTIVE/OBJECTIVE_OBGYN_ALL_OB_FT
Pt seen for progression of labor at attending request    VE: 5.5/80/-2  EFM: 120/moderate variability/+accels/-decels  TOCO: irregular    LCavalieri PAC

## 2023-01-05 NOTE — CONSULT NOTE ADULT - ASSESSMENT
32F who presented last August with syncope at 20w gestation and found to have LVEF of 40-45% c/f non-compaction (LHC unremarkable) presents with spontaneous labor. HF consulted due to history of decreased LV function.     Cardiac Studies:  ECHO 8/26: Mitral annular calcification with minimal MR, mild to moderate global LV systolic dysfunction with increased trabeculation in the LV apex suggestive of LV noncompaction cardiomyopathy. EF 40-45%  8/27 Cardiac Catheterization was normal.    8/29 Cardiac MRI Normal LV size and function, EF 56%, no findings suggestive of LV noncompaction cardiomyopathy.

## 2023-01-05 NOTE — CONSULT NOTE ADULT - PROBLEM SELECTOR RECOMMENDATION 9
Suspected non-compaction; [facility] normal coronaries   Currently euvolemic and without any concerning symptoms of decompensated HF  Please limit intravascular fluid administer as much as possible. If needed, will give diuretic dose if patient is deemed to not be compensating well with fluids shifts during labor Suspected non-compaction; [facility] normal coronaries   Currently euvolemic and without any concerning symptoms of decompensated HF  Please limit intravascular fluid administer as much as possible. If needed, will give diuretic dose if patient is deemed to not be compensating well with fluids shifts during/after labor

## 2023-01-05 NOTE — CONSULT NOTE ADULT - SUBJECTIVE AND OBJECTIVE BOX
Patient seen and evaluated at bedside    Chief Complaint:    HPI:  31 y/o  @ 39w1d NERISSA 2023, presents with contractions started midnight. it was every 5 minutes ovenright and became every 2 minutes this morning. reports +FM. denies vaginal bleeding or leakage of fluid. denies dizziness, SOB, CP or palpitations at present time. Patient presented August with syncope, was found to have LVEF of 40-45%, c/f noncompaction. LHC was negative.       PMHx:   No pertinent past medical history    Heart failure    Left bundle branch block (LBBB)    GBS (group B streptococcus) infection    H/O cardiomyopathy        PSHx:   No significant past surgical history        Allergies:  No Known Allergies      Home Meds: As per admission medication reconcilliation.     Current Medications:   ampicillin  IVPB 1 Gram(s) IV Intermittent every 4 hours  chlorhexidine 2% Cloths 1 Application(s) Topical once  citric acid/sodium citrate Solution 15 milliLiter(s) Oral every 6 hours  famotidine Injectable 20 milliGRAM(s) IV Push once  lactated ringers. 1000 milliLiter(s) IV Continuous <Continuous>  metoprolol succinate ER 25 milliGRAM(s) Oral daily  oxytocin Infusion 333.333 milliUNIT(s)/Min IV Continuous <Continuous>  oxytocin Infusion. 4 milliUNIT(s)/Min IV Continuous <Continuous>      FAMILY HISTORY: Uncle had ICD in his 30s      Social History: No tobacco or etoh.     REVIEW OF SYSTEMS:  Constitutional:     [x ] negative [ ] fevers [ ] chills [ ] weight loss [ ] weight gain  HEENT:                  [x ] negative [ ] dry eyes [ ] eye irritation [ ] postnasal drip [ ] nasal congestion  CV:                         [ x] negative  [ ] chest pain [ ] orthopnea [ ] palpitations [ ] murmur  Resp:                     [x ] negative [ ] cough [ ] shortness of breath [ ] dyspnea [ ] wheezing [ ] sputum [ ]hemoptysis  GI:                          [ ] negative [ ] nausea [ ] vomiting [ ] diarrhea [ ] constipation  x] abd pain [ ] dysphagia   :                        [ x] negative [ ] dysuria [ ] nocturia [ ] hematuria [ ] increased urinary frequency  Musculoskeletal: [ ] negative [x ] back pain [ ] myalgias [ ] arthralgias [ ] fracture  Skin:                       [ x] negative [ ] rash [ ] itch  Neurological:        [ x] negative [ ] headache [ ] dizziness [ ] syncope [ ] weakness [ ] numbness  Psychiatric:           [ x] negative [ ] anxiety [ ] depression  Endocrine:            [ x] negative [ ] diabetes [ ] thyroid problem  Heme/Lymph:      [ x] negative [ ] anemia [ ] bleeding problem  Allergic/Immune: [ x] negative [ ] itchy eyes [ ] nasal discharge [ ] hives [ ] angioedema    [ x] All other systems negative  [ ] Unable to assess ROS due to      Physical Exam:  T(F): 98.24 (), Max: 98.8 ()  HR: 90 () (70 - 116)  BP: 134/83 () (100/51 - 147/98)  RR: 17 ()  SpO2: 100% ()  General: Alert, no acute distress, appears comfortable   HEENT: No scleral icterus, EOMI, no facial dysmorphia, no external ear lesions   Cardiac: Regular rate and rhythm, no murmurs, no rubs, no gallops   Pulmonary: Clear breath sounds throughout, no wheezing, no stridor, no crackles   Abdomen: Pregnant   Skin: no obvious rash or lesions   Extremities: no LE edema  Neurological: Moving all 4 extremities, no overt focal deficits noted   Psych: normal mood and affect     Labs: Personally reviewed                        13.2   11.08 )-----------( 302      ( 2023 14:11 )             41.8         137  |  103  |  11  ----------------------------<  81  4.4   |  21<L>  |  0.74    Ca    9.4      2023 10:21  Phos  2.8       Mg     1.9         TPro  7.3  /  Alb  4.0  /  TBili  0.9  /  DBili  x   /  AST  21  /  ALT  9<L>  /  AlkPhos  204<H>      PT/INR - ( 2023 10:20 )   PT: 9.8 sec;   INR: 0.85 ratio         PTT - ( 2023 10:20 )  PTT:26.9 sec        Conclusions:  1. Mitral annular calcification, otherwise normal mitral  valve. Minimal mitral regurgitation.  2. Normal trileaflet aortic valve. No aortic valve  regurgitation seen.  3. Mild-moderate global left ventricular systolic  dysfunction. There is increased trabeculation in the left  ventricular apex, suggestive of LVNC. Consider further  imaging with cMRI.  4. The right ventricle is not well visualized; grossly  normal right ventricular systolic function.  *** No previous Echo exam.  ------------------------------------------------------------------------  Confirmed on  2022 - 16:08:03 by Bereket Mckeon M.D.

## 2023-01-05 NOTE — PRE-ANESTHESIA EVALUATION ADULT - NSANTHPMHFT_GEN_ALL_CORE
-  Patient presented to the ED at 20 weeks with syncope, was diagnosed with LBBB on EKG.    -  ECHO 8/26: Mitral annular calcification with minimal MR, mild to moderate global LV systolic dysfunction with increased trabeculation in the LV apex suggestive of LV noncompaction cardiomyopathy. EF 40-45%   -  8/27 Cardiac Catheterization was normal.    -  8/29 Cardiac MRI Normal LV size and function, EF 56%, no findings suggestive of LV noncompaction cardiomyopathy.    -  Currently following with Dr. Patrick as an outpatient, s/p Biotel x 30 days with no other significant findings.    -  Most recent visit 10/18/22: EKG showed sinus rhythm with LBBB   -  Fetal Echo 8/31: Normal     S/p cardiology and anesthesia consult.  No other PMH

## 2023-01-05 NOTE — CHART NOTE - NSCHARTNOTEFT_GEN_A_CORE
Spoke with Irene WHITE at Dr. Patrick's office. They are aware patient presented in labor and will remain on Tele throughout duration of her labor course. Will update team if any changes in clinical status.   D/w Dr. Oppenheim RFrankel PGY4

## 2023-01-05 NOTE — OB PROVIDER H&P - NSMATERNALFETALCONCERNS_OBGYN_ALL_OB_FT
Maternal Alert  11/10/22 - Maternal MDM held 11/2/22.  Maternal hx of left BBB identifiend during this pregnancy.  Cardiologist - Dr. Patrick.  See detailed minutes.  Plan for telemetry during labor and delivery.  Postpartum need for telemetry will be based on intrapartum course. -Milena Novak RN

## 2023-01-05 NOTE — OB PROVIDER H&P - HISTORY OF PRESENT ILLNESS
-  Patient presented to the ED at 20 weeks with syncope, was diagnosed with LBBB on EKG.    -  ECHO 8/26: Mitral annular calcification with minimal MR, mild to moderate global LV systolic dysfunction with increased trabeculation in the LV apex suggestive of LV noncompaction cardiomyopathy. EF 40-45%   -  8/27 Cardiac Catheterization was normal.    -  8/29 Cardiac MRI Normal LV size and function, EF 56%, no findings suggestive of LV noncompaction cardiomyopathy.    -  Currently following with Dr. Patrick as an outpatient, s/p Biotel x 30 days with no other significant findings.    -  Most recent visit 10/18/22: EKG showed sinus rhythm with LBBB   -  Fetal Echo 8/31: Normal   - Anesthesia cosult: to be scheduled.  i.	Plan:    i.	            Timing of Delivery to be based on obstetrical indications   ii.	Mode of Delivery to be based on obstetrical indications   iii.	Plan for telemetry   iv.	to determine the need for postpartum telemetry based on her  intrapartum course  Ms. Terrell is 33 y/o  @ 39w1d NERISSA 2023, presents with contractions started midnight. it was every 5 minutes ovenright and became every 2 minutes this morning. reports +FM. denies vaginal bleeding or leakage of fluid. denies dizziness, SOB, CP or palpitations at present time     PGYNHx: denies hx of fibroid or ovarian cysts   PMHx: syncope, LBBB, cardiomyopathy, new HFrEF (EF 40-45%, LVEDD 4.2 cm) with possible noncompaction, GBS+  PSHx: denies   ALL: NKDA  SH: denies depression/anxiety, denies t/e/d        MDM as following     -  Patient presented to the ED at 20 weeks with syncope, was diagnosed with LBBB on EKG.    -  ECHO : Mitral annular calcification with minimal MR, mild to moderate global LV systolic dysfunction with increased trabeculation in the LV apex suggestive of LV noncompaction cardiomyopathy. EF 40-45%   -   Cardiac Catheterization was normal.    -   Cardiac MRI Normal LV size and function, EF 56%, no findings suggestive of LV noncompaction cardiomyopathy.    -  Currently following with Dr. Patrick as an outpatient, s/p Biotel x 30 days with no other significant findings.    -  Most recent visit 10/18/22: EKG showed sinus rhythm with LBBB   -  Fetal Echo : Normal   - Anesthesia cosult: to be scheduled.  i.	Plan:    i.	            Timing of Delivery to be based on obstetrical indications   ii.	Mode of Delivery to be based on obstetrical indications   iii.	Plan for telemetry   iv.	to determine the need for postpartum telemetry based on her  intrapartum course

## 2023-01-05 NOTE — OB PROVIDER H&P - ASSESSMENT
33 y/o  @ 39w1d NERISSA 23, in labor     - Admit to L&D  - CLD/IVF  - EFW/[place]  - admission labs, CBC/CMP/mag/phos/coags/T&S  - EP consulted, spoke to BEE Romero: defer evaluation at present time unless change in clinical status  - HF team consulted  - Anesthesia consulted for epidural  - telemetry monitoring   - Consulted MFM/ OB cardiology  - GBS+: will start ampicillin     d/w Dr. Oppenheim Lulu Feng PA-C     33 y/o  @ 39w1d NERISSA 23, in labor     - Admit to L&D  - CLD/IVF  - EFW/[place]  - admission labs, CBC/CMP/mag/phos/coags/T&S  - EP consulted, spoke to BEE Romero: defer evaluation at present time unless change in clinical status  - HF team consulted  - Anesthesia consulted for epidural  - telemetry monitoring   - Consulted MFM/ OB cardiology  - GBS+: will start ampicillin     d/w Dr. Oppenheim Lulu Feng PA-C    pt seen and eval by me   H and P reviewed, agree with above  anticipate    M.Oppenheim, MD

## 2023-01-05 NOTE — OB PROVIDER LABOR PROGRESS NOTE - ASSESSMENT
FH with dec variabilty, VE 3/70/-2   AROM, inc variability with exam, reassuring   plan to start pitocin as ctx frequency has decreased

## 2023-01-05 NOTE — OB RN PATIENT PROFILE - FALL HARM RISK - RISK INTERVENTIONS

## 2023-01-05 NOTE — OB RN PATIENT PROFILE - NS_PRENATALSTART_OBGYN_ALL_OB
Avoid insulin stacking and hypoglycemia.  Lab Results   Component Value Date    CREATININE 2.4 (H) 11/09/2018      Started first trimester

## 2023-01-06 PROCEDURE — 99233 SBSQ HOSP IP/OBS HIGH 50: CPT

## 2023-01-06 RX ORDER — MAGNESIUM HYDROXIDE 400 MG/1
30 TABLET, CHEWABLE ORAL
Refills: 0 | Status: DISCONTINUED | OUTPATIENT
Start: 2023-01-06 | End: 2023-01-07

## 2023-01-06 RX ORDER — SIMETHICONE 80 MG/1
80 TABLET, CHEWABLE ORAL EVERY 4 HOURS
Refills: 0 | Status: DISCONTINUED | OUTPATIENT
Start: 2023-01-06 | End: 2023-01-07

## 2023-01-06 RX ORDER — BENZOCAINE 10 %
1 GEL (GRAM) MUCOUS MEMBRANE EVERY 6 HOURS
Refills: 0 | Status: DISCONTINUED | OUTPATIENT
Start: 2023-01-06 | End: 2023-01-07

## 2023-01-06 RX ORDER — OXYTOCIN 10 UNIT/ML
10 VIAL (ML) INJECTION ONCE
Refills: 0 | Status: COMPLETED | OUTPATIENT
Start: 2023-01-06 | End: 2023-01-06

## 2023-01-06 RX ORDER — ACETAMINOPHEN 500 MG
975 TABLET ORAL
Refills: 0 | Status: DISCONTINUED | OUTPATIENT
Start: 2023-01-06 | End: 2023-01-07

## 2023-01-06 RX ORDER — IBUPROFEN 200 MG
600 TABLET ORAL EVERY 6 HOURS
Refills: 0 | Status: COMPLETED | OUTPATIENT
Start: 2023-01-06 | End: 2023-12-05

## 2023-01-06 RX ORDER — SODIUM CHLORIDE 9 MG/ML
3 INJECTION INTRAMUSCULAR; INTRAVENOUS; SUBCUTANEOUS EVERY 8 HOURS
Refills: 0 | Status: DISCONTINUED | OUTPATIENT
Start: 2023-01-06 | End: 2023-01-07

## 2023-01-06 RX ORDER — IBUPROFEN 200 MG
600 TABLET ORAL EVERY 6 HOURS
Refills: 0 | Status: DISCONTINUED | OUTPATIENT
Start: 2023-01-06 | End: 2023-01-07

## 2023-01-06 RX ORDER — OXYCODONE HYDROCHLORIDE 5 MG/1
5 TABLET ORAL
Refills: 0 | Status: DISCONTINUED | OUTPATIENT
Start: 2023-01-06 | End: 2023-01-07

## 2023-01-06 RX ORDER — LANOLIN
1 OINTMENT (GRAM) TOPICAL EVERY 6 HOURS
Refills: 0 | Status: DISCONTINUED | OUTPATIENT
Start: 2023-01-06 | End: 2023-01-07

## 2023-01-06 RX ORDER — DIPHENHYDRAMINE HCL 50 MG
25 CAPSULE ORAL EVERY 6 HOURS
Refills: 0 | Status: DISCONTINUED | OUTPATIENT
Start: 2023-01-06 | End: 2023-01-07

## 2023-01-06 RX ORDER — AER TRAVELER 0.5 G/1
1 SOLUTION RECTAL; TOPICAL EVERY 4 HOURS
Refills: 0 | Status: DISCONTINUED | OUTPATIENT
Start: 2023-01-06 | End: 2023-01-07

## 2023-01-06 RX ORDER — DIBUCAINE 1 %
1 OINTMENT (GRAM) RECTAL EVERY 6 HOURS
Refills: 0 | Status: DISCONTINUED | OUTPATIENT
Start: 2023-01-06 | End: 2023-01-07

## 2023-01-06 RX ORDER — OXYTOCIN 10 UNIT/ML
41.67 VIAL (ML) INJECTION
Qty: 20 | Refills: 0 | Status: DISCONTINUED | OUTPATIENT
Start: 2023-01-06 | End: 2023-01-06

## 2023-01-06 RX ORDER — TETANUS TOXOID, REDUCED DIPHTHERIA TOXOID AND ACELLULAR PERTUSSIS VACCINE, ADSORBED 5; 2.5; 8; 8; 2.5 [IU]/.5ML; [IU]/.5ML; UG/.5ML; UG/.5ML; UG/.5ML
0.5 SUSPENSION INTRAMUSCULAR ONCE
Refills: 0 | Status: DISCONTINUED | OUTPATIENT
Start: 2023-01-06 | End: 2023-01-07

## 2023-01-06 RX ORDER — PRAMOXINE HYDROCHLORIDE 150 MG/15G
1 AEROSOL, FOAM RECTAL EVERY 4 HOURS
Refills: 0 | Status: DISCONTINUED | OUTPATIENT
Start: 2023-01-06 | End: 2023-01-07

## 2023-01-06 RX ORDER — OXYTOCIN 10 UNIT/ML
33.33 VIAL (ML) INJECTION
Qty: 20 | Refills: 0 | Status: DISCONTINUED | OUTPATIENT
Start: 2023-01-06 | End: 2023-01-07

## 2023-01-06 RX ORDER — OXYCODONE HYDROCHLORIDE 5 MG/1
5 TABLET ORAL ONCE
Refills: 0 | Status: DISCONTINUED | OUTPATIENT
Start: 2023-01-06 | End: 2023-01-07

## 2023-01-06 RX ORDER — HYDROCORTISONE 1 %
1 OINTMENT (GRAM) TOPICAL EVERY 6 HOURS
Refills: 0 | Status: DISCONTINUED | OUTPATIENT
Start: 2023-01-06 | End: 2023-01-07

## 2023-01-06 RX ORDER — KETOROLAC TROMETHAMINE 30 MG/ML
30 SYRINGE (ML) INJECTION ONCE
Refills: 0 | Status: DISCONTINUED | OUTPATIENT
Start: 2023-01-06 | End: 2023-01-06

## 2023-01-06 RX ADMIN — Medication 10 UNIT(S): at 01:30

## 2023-01-06 RX ADMIN — Medication 600 MILLIGRAM(S): at 16:01

## 2023-01-06 RX ADMIN — Medication 975 MILLIGRAM(S): at 09:30

## 2023-01-06 RX ADMIN — Medication 975 MILLIGRAM(S): at 21:48

## 2023-01-06 RX ADMIN — Medication 975 MILLIGRAM(S): at 08:23

## 2023-01-06 RX ADMIN — Medication 30 MILLIGRAM(S): at 04:05

## 2023-01-06 RX ADMIN — Medication 975 MILLIGRAM(S): at 20:38

## 2023-01-06 RX ADMIN — Medication 600 MILLIGRAM(S): at 17:09

## 2023-01-06 RX ADMIN — Medication 600 MILLIGRAM(S): at 10:16

## 2023-01-06 RX ADMIN — Medication 12.5 MILLIGRAM(S): at 21:06

## 2023-01-06 NOTE — OB RN DELIVERY SUMMARY - NS_SEPSISRSKCALC_OBGYN_ALL_OB_FT
EOS calculated successfully. EOS Risk Factor: 0.5/1000 live births ([facility] national incidence); GA=39w2d; Temp=98.8; ROM=11.65; GBS='Positive'; Antibiotics='GBS specific antibiotics > 2 hrs prior to birth'

## 2023-01-06 NOTE — OB RN DELIVERY SUMMARY - NSSELHIDDEN_OBGYN_ALL_OB_FT
[NS_DeliveryAttending1_OBGYN_ALL_OB_FT:[id number]],[NS_DeliveryAssist1_OBGYN_ALL_OB_FT:[id number]],[NS_DeliveryRN_OBGYN_ALL_OB_FT:[id number]=]

## 2023-01-06 NOTE — OB PROVIDER DELIVERY SUMMARY - NSPROVIDERDELIVERYNOTE_OBGYN_ALL_OB_FT
Pt found to be fully dilated with urge to push, after pushing for approximately one hour, significant maternal exhaustion noted. Patient agreed to vacuum assistance. With one pull, successful delivery of viable male infant. Head delivered, nuchal x 1 unable to be reduced, shoulders and body delivered easily in KAEL position. Placenta delivered intact. Vaginal exam revealed intact cervix, vaginal walls, sulci. RML identified and repaired in usual fashion with 2-0 chromic suture. Bimanual exam performed, with minimal clot evacuated. Fundus and lower uterine segment firm. Excellent hemostasis.    Darshana Khoury PGY-3  w/ Dr. Oppenheim

## 2023-01-06 NOTE — CHART NOTE - NSCHARTNOTEFT_GEN_A_CORE
Spoke with HF team. Patient is no 12hrs s/p , no events on Tele and has been euvolemic throughout. Per HF team, patient does not require tele PP. Patient cleared for transfer for PP floor.     D/w patient's RN and Dr. Zubkina RFrankel PGY4

## 2023-01-06 NOTE — PROGRESS NOTE ADULT - PROBLEM SELECTOR PLAN 1
Suspected non-compaction; [facility] normal coronaries   Now s/p delivery. Patient with minimal/trace LE edema, some JVP at base of clavicle. Overall no concern for decompensated HF and no indication for diuretics at this time. Patient can be off telemetry. Patient can follow up with general cardiology at discharge followed by heart failure (Dr. Bruce Gutiérrez) afterwards. Please call back with any questions or concerns.

## 2023-01-06 NOTE — OB PROVIDER DELIVERY SUMMARY - NSSELHIDDEN_OBGYN_ALL_OB_FT
[NS_DeliveryAttending1_OBGYN_ALL_OB_FT:[id number]],[NS_DeliveryAssist1_OBGYN_ALL_OB_FT:[id number]],[NS_DeliveryRN_OBGYN_ALL_OB_FT:[email or web address]=]

## 2023-01-06 NOTE — OB NEONATOLOGY/PEDIATRICIAN DELIVERY SUMMARY - NSPEDSNEONOTESA_OBGYN_ALL_OB_FT
Peds called to delivery of a 39.2wk male born on 23 at 0118 via VAVD to a 33 y/o  blood type B- mother, COVID - on 23.  Maternal history of syncope, LBBB, cardiomyopathy with ejection fraction 46%, taking metoprolol.  No significant prenatal history.  PNL HIV -/Hep B-/RPR non-reactive/Rubella immune, GBS + on 22, given Amp x4 (last  at 2219).  AROM on 23 at 1339 with clear fluids.  Baby emerged vigorous, crying, was warmed/ dried/ suctioned/ stimulated with APGARS of 7/9.  Mom plans to initiate breastfeeding, consents to Hep B vaccine, and consents to circ.  Highest maternal temp 37C with EOS of 0.09.  Admitted under Dr. Feliz. Peds called to delivery of a 39.2wk male born on 23 at 0118 via VAVD to a 33 y/o  blood type B- mother, COVID - on 23.  Maternal history of syncope, LBBB, cardiomyopathy with ejection fraction 46%, taking metoprolol.  No significant prenatal history.  PNL HIV -/Hep B-/RPR non-reactive/Rubella immune, GBS + on 22, given Amp x4 (last  at 2219).  AROM on 23 at 1339 with clear fluids.  Baby emerged vigorous, crying, was warmed/ dried/ suctioned/ stimulated with APGARS of 7/9; had nuchal cord x1.  Mom plans to initiate breastfeeding, consents to Hep B vaccine, and consents to circ.  Highest maternal temp 37C with EOS of 0.09.  Admitted under Dr. Feliz.

## 2023-01-07 ENCOUNTER — TRANSCRIPTION ENCOUNTER (OUTPATIENT)
Age: 33
End: 2023-01-07

## 2023-01-07 VITALS
SYSTOLIC BLOOD PRESSURE: 131 MMHG | DIASTOLIC BLOOD PRESSURE: 79 MMHG | OXYGEN SATURATION: 98 % | TEMPERATURE: 98 F | HEART RATE: 82 BPM | RESPIRATION RATE: 18 BRPM

## 2023-01-07 LAB — KLEIHAUER-BETKE CALCULATION: 0 % — SIGNIFICANT CHANGE UP (ref 0–0.3)

## 2023-01-07 PROCEDURE — 86901 BLOOD TYPING SEROLOGIC RH(D): CPT

## 2023-01-07 PROCEDURE — 86900 BLOOD TYPING SEROLOGIC ABO: CPT

## 2023-01-07 PROCEDURE — 59050 FETAL MONITOR W/REPORT: CPT

## 2023-01-07 PROCEDURE — 81001 URINALYSIS AUTO W/SCOPE: CPT

## 2023-01-07 PROCEDURE — 82570 ASSAY OF URINE CREATININE: CPT

## 2023-01-07 PROCEDURE — 85730 THROMBOPLASTIN TIME PARTIAL: CPT

## 2023-01-07 PROCEDURE — 83735 ASSAY OF MAGNESIUM: CPT

## 2023-01-07 PROCEDURE — 86870 RBC ANTIBODY IDENTIFICATION: CPT

## 2023-01-07 PROCEDURE — 85025 COMPLETE CBC W/AUTO DIFF WBC: CPT

## 2023-01-07 PROCEDURE — 80053 COMPREHEN METABOLIC PANEL: CPT

## 2023-01-07 PROCEDURE — 87635 SARS-COV-2 COVID-19 AMP PRB: CPT

## 2023-01-07 PROCEDURE — 84156 ASSAY OF PROTEIN URINE: CPT

## 2023-01-07 PROCEDURE — 85384 FIBRINOGEN ACTIVITY: CPT

## 2023-01-07 PROCEDURE — 84550 ASSAY OF BLOOD/URIC ACID: CPT

## 2023-01-07 PROCEDURE — 86850 RBC ANTIBODY SCREEN: CPT

## 2023-01-07 PROCEDURE — 86780 TREPONEMA PALLIDUM: CPT

## 2023-01-07 PROCEDURE — 86769 SARS-COV-2 COVID-19 ANTIBODY: CPT

## 2023-01-07 PROCEDURE — 84100 ASSAY OF PHOSPHORUS: CPT

## 2023-01-07 PROCEDURE — 36415 COLL VENOUS BLD VENIPUNCTURE: CPT

## 2023-01-07 PROCEDURE — 85460 HEMOGLOBIN FETAL: CPT

## 2023-01-07 PROCEDURE — 83615 LACTATE (LD) (LDH) ENZYME: CPT

## 2023-01-07 PROCEDURE — 85610 PROTHROMBIN TIME: CPT

## 2023-01-07 RX ORDER — ACETAMINOPHEN 500 MG
2 TABLET ORAL
Qty: 0 | Refills: 0 | DISCHARGE

## 2023-01-07 RX ORDER — IBUPROFEN 200 MG
1 TABLET ORAL
Qty: 0 | Refills: 0 | DISCHARGE

## 2023-01-07 RX ADMIN — Medication 1 TABLET(S): at 12:02

## 2023-01-07 RX ADMIN — Medication 600 MILLIGRAM(S): at 12:02

## 2023-01-07 RX ADMIN — Medication 975 MILLIGRAM(S): at 09:45

## 2023-01-07 RX ADMIN — Medication 975 MILLIGRAM(S): at 04:15

## 2023-01-07 RX ADMIN — Medication 975 MILLIGRAM(S): at 09:35

## 2023-01-07 RX ADMIN — Medication 975 MILLIGRAM(S): at 03:16

## 2023-01-07 NOTE — PROGRESS NOTE ADULT - ATTENDING COMMENTS
PPD#1  VS stable  meeting postpartum milestones  discharge planning    MD Iesha
31 y/o F with mild LV dysfunction, now s/p vaginal delivery. Off IVF. Appears well-compensated from a cardiac perspective, trace edema. If patient becomes short of breath or develops peripheral edema, please notify Heart Failure Service. Otherwise will follow up in the outpatient setting.

## 2023-01-07 NOTE — DISCHARGE NOTE OB - MEDICATION SUMMARY - MEDICATIONS TO TAKE
I will START or STAY ON the medications listed below when I get home from the hospital:    Tylenol 500 mg oral tablet  -- 2 tab(s) by mouth every 6 hours, As Needed  -- Indication: For pain med    Motrin 600 mg oral tablet  -- 1 tab(s) by mouth every 6 hours, As Needed  -- Indication: For pain med    Prenatal Multivitamins with Folic Acid 1 mg oral tablet  -- 1 tab(s) by mouth once a day  -- Indication: For Home med

## 2023-01-07 NOTE — PROGRESS NOTE ADULT - ASSESSMENT
A/P: 31yo PPD#1 s/p VAVD. . PMH significant for LBBB, EF (40-45%). Patient is stable and doing well post-partum.     #LBBB  - s/p tele (1/6-1/7)  - HF team recs: if patient becomes short of breath or develops peripheral edema, please notify Heart Failure Service. Otherwise will follow up in the outpatient setting.     #Routine Postpartum Care  - Continue with PO analgesia  - Increase ambulation  - Continue regular diet  - No labs      Delaney Ndiaye, PGY1
32F who presented last August with syncope at 20w gestation and found to have LVEF of 40-45% c/f non-compaction (LHC unremarkable) presents with spontaneous labor. HF consulted due to history of decreased LV function.     Cardiac Studies:  ECHO 8/26: Mitral annular calcification with minimal MR, mild to moderate global LV systolic dysfunction with increased trabeculation in the LV apex suggestive of LV noncompaction cardiomyopathy. EF 40-45%  8/27 Cardiac Catheterization was normal.    8/29 Cardiac MRI Normal LV size and function, EF 56%, no findings suggestive of LV noncompaction cardiomyopathy.

## 2023-01-07 NOTE — DISCHARGE NOTE OB - CARE PROVIDER_API CALL
Oppenheim, Melissa H (MD)  Obstetrics and Gynecology  [address] [facility], [address] [place] [address]  Phone: [phone]  Fax: [phone]  Follow Up Time:

## 2023-01-07 NOTE — DISCHARGE NOTE OB - PATIENT PORTAL LINK FT
You can access the FollowMyHealth Patient Portal offered by [facility] by registering at the following website: [email or web address]. By joining [employer]’s FollowMyHealth portal, you will also be able to view your health information using other applications (apps) compatible with our system.

## 2023-01-07 NOTE — DISCHARGE NOTE OB - NS MD DC FALL RISK RISK
For information on Fall & Injury Prevention, visit: [email or web address] OR  [email or web address] OR  https://www.cdc.gov/steadi/patient.html

## 2023-01-07 NOTE — DISCHARGE NOTE OB - WEIGHT IN KG
Form completed and is available for fax.  
Home [facility] Care * #[id number] plan of care 11- received via fax     Forms in [facility] mail box for review and signature.    
Paperwork signed and faxed.    
Clear
59

## 2023-01-07 NOTE — DISCHARGE NOTE OB - REST! DO NOT DO HEAVY HOUSEWORK, LIFTING OR STRENOUS EXERCISE FOR TWO WEEKS
----- Message from Mary Spaulding MD sent at 3/21/2022 10:39 AM CDT -----  Please call --Urine culture from 3/18 did not reveal a urinary tract infection.   NOAH Spaulding MD   Statement Selected

## 2023-01-07 NOTE — PROGRESS NOTE ADULT - SUBJECTIVE AND OBJECTIVE BOX
Patient seen and examined at bedside.    Overnight Events:   Now post delivery. Patient with minimal edema. Denies any symptoms.     REVIEW OF SYSTEMS:  Constitutional:     [x ] negative [ ] fevers [ ] chills [ ] weight loss [ ] weight gain  HEENT:                  [x ] negative [ ] dry eyes [ ] eye irritation [ ] postnasal drip [ ] nasal congestion  CV:                         [ x] negative  [ ] chest pain [ ] orthopnea [ ] palpitations [ ] murmur  Resp:                     [x ] negative [ ] cough [ ] shortness of breath [ ] dyspnea [ ] wheezing [ ] sputum [ ]hemoptysis  GI:                          [x ] negative [ ] nausea [ ] vomiting [ ] diarrhea [ ] constipation [ ] abd pain [ ] dysphagia   :                        [ x] negative [ ] dysuria [ ] nocturia [ ] hematuria [ ] increased urinary frequency  Musculoskeletal: [x ] negative [ ] back pain [ ] myalgias [ ] arthralgias [ ] fracture  Skin:                       [ x] negative [ ] rash [ ] itch  Neurological:        [ x] negative [ ] headache [ ] dizziness [ ] syncope [ ] weakness [ ] numbness  Psychiatric:           [ x] negative [ ] anxiety [ ] depression  Endocrine:            [ x] negative [ ] diabetes [ ] thyroid problem  Heme/Lymph:      [ x] negative [ ] anemia [ ] bleeding problem  Allergic/Immune: [ x] negative [ ] itchy eyes [ ] nasal discharge [ ] hives [ ] angioedema    [ x] All other systems negative          Current Meds:  acetaminophen     Tablet .. 975 milliGRAM(s) Oral <User Schedule>  benzocaine 20%/menthol 0.5% Spray 1 Spray(s) Topical every 6 hours PRN  dibucaine 1% Ointment 1 Application(s) Topical every 6 hours PRN  diphenhydrAMINE 25 milliGRAM(s) Oral every 6 hours PRN  diphtheria/tetanus/pertussis (acellular) Vaccine (Adacel) 0.5 milliLiter(s) IntraMuscular once  hydrocortisone 1% Cream 1 Application(s) Topical every 6 hours PRN  ibuprofen  Tablet. 600 milliGRAM(s) Oral every 6 hours  lactated ringers. 1000 milliLiter(s) IV Continuous <Continuous>  lanolin Ointment 1 Application(s) Topical every 6 hours PRN  magnesium hydroxide Suspension 30 milliLiter(s) Oral two times a day PRN  metoprolol succinate ER 12.5 milliGRAM(s) Oral daily  oxyCODONE    IR 5 milliGRAM(s) Oral every 3 hours PRN  oxyCODONE    IR 5 milliGRAM(s) Oral once PRN  oxytocin Infusion 33.333 milliUNIT(s)/Min IV Continuous <Continuous>  oxytocin Infusion. 4 milliUNIT(s)/Min IV Continuous <Continuous>  pramoxine 1%/zinc 5% Cream 1 Application(s) Topical every 4 hours PRN  prenatal multivitamin 1 Tablet(s) Oral daily  simethicone 80 milliGRAM(s) Chew every 4 hours PRN  sodium chloride 0.9% lock flush 3 milliLiter(s) IV Push every 8 hours  witch hazel Pads 1 Application(s) Topical every 4 hours PRN      Vitals:  T(F): 97.9 (01-06), Max: 99.1 (01-06)  HR: 67 (01-06) (64 - 118)  BP: 108/64 (01-06) (100/51 - 161/88)  RR: 17 (01-06)  SpO2: 98% (01-06)  I&O's Summary    05 Jan 2023 07:01  -  06 Jan 2023 07:00  --------------------------------------------------------  IN: 2570 mL / OUT: 550 mL / NET: 2020 mL    06 Jan 2023 07:01 - 06 Jan 2023 14:02  --------------------------------------------------------  IN: 400 mL / OUT: 400 mL / NET: 0 mL      PHYSICAL EXAM:  Appearance: No acute distress; well appearing  Eyes: EOMI, no scleral icterus   HEENT: Normal oral mucosa  Cardiovascular: RRR, S1, S2, no murmurs, rubs, or gallops; trace edema; no JVD  Respiratory: Clear to auscultation bilaterally, no auditory stridor   Gastrointestinal: soft, non-tender, non-distended with normal bowel sounds  Musculoskeletal: No clubbing; no joint deformity   Neurologic: Moving all 4 extremities spontaneously, sensation grossly intact  Psychiatry: AAOx3, mood & affect appropriate  Skin: No rashes, ecchymoses, or cyanosis                          13.2   11.08 )-----------( 302      ( 05 Jan 2023 14:11 )             41.8     01-05    137  |  103  |  11  ----------------------------<  81  4.4   |  21<L>  |  0.74    Ca    9.4      05 Jan 2023 10:21  Phos  2.8     01-05  Mg     1.9     01-05    TPro  7.3  /  Alb  4.0  /  TBili  0.9  /  DBili  x   /  AST  21  /  ALT  9<L>  /  AlkPhos  204<H>  01-05    PT/INR - ( 05 Jan 2023 10:20 )   PT: 9.8 sec;   INR: 0.85 ratio         PTT - ( 05 Jan 2023 10:20 )  PTT:26.9 sec            
OB Progress Note: VAVD PPD#1    S: Patient feels well. Pain is well controlled. She is tolerating a regular diet and passing flatus. She is voiding spontaneously, and ambulating without difficulty. Denies CP/SOB. Denies HA/lightheadedness/dizziness. Denies N/V. Denies calf pain.    O:  Vitals:  Vital Signs Last 24 Hrs  T(C): 36.5 (07 Jan 2023 05:30), Max: 36.8 (06 Jan 2023 14:08)  T(F): 97.7 (07 Jan 2023 05:30), Max: 98.2 (06 Jan 2023 14:08)  HR: 71 (07 Jan 2023 05:30) (64 - 91)  BP: 115/74 (07 Jan 2023 05:30) (103/63 - 116/78)  BP(mean): --  RR: 18 (07 Jan 2023 05:30) (16 - 18)  SpO2: 96% (07 Jan 2023 05:30) (96% - 99%)    Parameters below as of 07 Jan 2023 05:30  Patient On (Oxygen Delivery Method): room air        Physical Exam:  General: NAD  Abdomen: soft, non-tender, non-distended, fundus firm  Vaginal: lochia wnl, exam deferred  Extremities: no erythema/calf tenderness    MEDICATIONS  (STANDING):  acetaminophen     Tablet .. 975 milliGRAM(s) Oral <User Schedule>  diphtheria/tetanus/pertussis (acellular) Vaccine (Adacel) 0.5 milliLiter(s) IntraMuscular once  ibuprofen  Tablet. 600 milliGRAM(s) Oral every 6 hours  lactated ringers. 1000 milliLiter(s) (100 mL/Hr) IV Continuous <Continuous>  metoprolol succinate ER 12.5 milliGRAM(s) Oral daily  oxytocin Infusion 33.333 milliUNIT(s)/Min (100 mL/Hr) IV Continuous <Continuous>  oxytocin Infusion. 4 milliUNIT(s)/Min (4 mL/Hr) IV Continuous <Continuous>  prenatal multivitamin 1 Tablet(s) Oral daily  sodium chloride 0.9% lock flush 3 milliLiter(s) IV Push every 8 hours      Labs:  Blood type: B Negative  Rubella IgG: RPR: Negative                          13.2   11.08<H> >-----------< 302    ( 01-05 @ 14:11 )             41.8    01-05-23 @ 10:21      137  |  103  |  11  ----------------------------<  81  4.4   |  21<L>  |  0.74        Ca    9.4      05 Jan 2023 10:21  Phos  2.8     01-05  Mg     1.9     01-05    TPro  7.3  /  Alb  4.0  /  TBili  0.9  /  DBili  x   /  AST  21  /  ALT  9<L>  /  AlkPhos  204<H>  01-05-23 @ 10:21

## 2023-01-11 NOTE — PROGRESS NOTE ADULT - NS MD NEURO CONDITIONS_HEART1
Recent PHQ 2/9 Score    PHQ 2:  Date Adult PHQ 2 Score Adult PHQ 2 Interpretation   1/11/2023 1 No further screening needed       PHQ 9:     
Acute

## 2023-06-15 ENCOUNTER — APPOINTMENT (OUTPATIENT)
Dept: ELECTROPHYSIOLOGY | Facility: CLINIC | Age: 33
End: 2023-06-15

## 2023-07-14 NOTE — PATIENT PROFILE ADULT - VISION (WITH CORRECTIVE LENSES IF THE PATIENT USUALLY WEARS THEM):
Immediate family member
Normal vision: sees adequately in most situations; can see medication labels, newsprint

## 2023-10-12 NOTE — CHART NOTE - NSCHARTNOTEFT_GEN_A_CORE
CCU Accept Note    JHOANACHANILORETTA TAMMIE  32y  Patient is a 32y old  Female who presents with a chief complaint of syncope (26 Aug 2022 10:52)      ====================  HPI & Hospital Course: 32 year old  @ 20+1 weeks (NERISSA: 23) presents to the ED s/p a fall at 3pm. Patient states that at this time she was standing on the top of the stairs when she suddenly began to feel lower abdominal pain and dizzy. Because she thought she was about to synopsize, the patient sat down. Per the patient, the next thing she remembers is waking up at the bottom of the stairs sitting on her bottom. Patient thinks that she slid down the stairs on her bottom; however, she is unsure, as she was not conscious when she went down the stairs. Patient states that since the fall she has been feeling constant, lower back pain that she rates as an 8/10. She received Tylenol in the ED for the pain; however, states that it only marginally helped. Patient states that the back pain radiates to her lower abdomen. States that the pain in her lower abdomen comes and goes and she rates this pain as a 5/10. Of note, the syncopal episode was unwitnessed; however, she denies any urinary or bowel incontinence. Denies any tongue biting. Also states that when she regained consciousness she did not feel any increase in tiredness. Of note patient states that she had these presyncopal feelings multiple times in the past year that have intensified during pregnancy. Of note patient denies ever losing consciousness in the past. Patient states that these feelings are normally precipitated by warm temperatures or standing for prolonged periods of time. Patient denies any family history of anyone suffering from episodes akin to this. At time of OBGYN evaluation in the ED, patient still endorsed lightheadedness and dizziness and stated that she did not think she would be able to get out of bed without a syncopal event. Patient endorsed intermittent palpitations. Denies nausea, vomiting, chest pain at time of OBGYN initial evaluation.    Patient was complaining of chest pain and found to have a new left bundle branch block. Was emergent transferred to the cath lab and transferred to CCU for further monitoring.        Past Medical History:     Past Surgical History:     Home Medications:  Prenatal Multivitamins with Folic Acid 1 mg oral tablet: 1 tab(s) orally once a day (26 Aug 2022 11:48)      Current Medications:   MEDICATIONS  (STANDING):  famotidine Injectable 20 milliGRAM(s) IV Push once  sodium chloride 0.9%. 250 milliLiter(s) (50 mL/Hr) IV Continuous <Continuous>    MEDICATIONS  (PRN):      Allergies:     Family History:     Social History:     ====================  Vital Signs Last 24 Hrs  T(C): 37.1 (26 Aug 2022 11:50), Max: 37.6 (25 Aug 2022 20:38)  T(F): 98.7 (26 Aug 2022 11:50), Max: 99.7 (25 Aug 2022 21:00)  HR: 103 (26 Aug 2022 11:50) (73 - 118)  BP: 109/79 (26 Aug 2022 11:50) (109/79 - 119/76)  BP(mean): --  RR: 16 (26 Aug 2022 11:50) (16 - 20)  SpO2: 100% (26 Aug 2022 11:50) (81% - 100%)    Parameters below as of 26 Aug 2022 11:50  Patient On (Oxygen Delivery Method): room air        Adult Advanced Hemodynamics Last 24 Hrs  CVP(mm Hg): --  CVP(cm H2O): --  CO: --  CI: --  PA: --  PA(mean): --  PCWP: --  SVR: --  SVRI: --  PVR: --  PVRI: --    Physical Exam:   General: NAD  HEENT: PERRL, EOMI, normal sclera and conjunctiva, no oral lesions  Neck: Supple, no JVD  Lungs: CTA bilaterally  Heart: RRR, normal S1S2, no murmurs/rubs/gallops  Abdomen: Soft, ND/NT  Extremities: 2+ peripheral pulses, no cyanosis/clubbing/edema, full ROM  Skin: Warm, well-perfused  Neuro: A&O x3, no focal deficits      ====================  Labs & Imaging:   CBC Full  -  ( 25 Aug 2022 18:02 )  WBC Count : 9.53 K/uL  RBC Count : 3.70 M/uL  Hemoglobin : 11.4 g/dL  Hematocrit : 33.4 %  Platelet Count - Automated : 364 K/uL  Mean Cell Volume : 90.3 fl  Mean Cell Hemoglobin : 30.8 pg  Mean Cell Hemoglobin Concentration : 34.1 gm/dL  Auto Neutrophil # : 7.77 K/uL  Auto Lymphocyte # : 0.95 K/uL  Auto Monocyte # : 0.72 K/uL  Auto Eosinophil # : 0.03 K/uL  Auto Basophil # : 0.02 K/uL  Auto Neutrophil % : 81.5 %  Auto Lymphocyte % : 10.0 %  Auto Monocyte % : 7.6 %  Auto Eosinophil % : 0.3 %  Auto Basophil % : 0.2 %        137  |  104  |  10  ----------------------------<  89  3.8   |  23  |  0.56    Ca    8.5      25 Aug 2022 18:04    TPro  6.1  /  Alb  3.6  /  TBili  0.8  /  DBili  x   /  AST  21  /  ALT  16  /  AlkPhos  36<L>      PT/INR - ( 25 Aug 2022 18:03 )   PT: 10.7 sec;   INR: 0.92 ratio         PTT - ( 25 Aug 2022 18:03 )  PTT:25.8 sec          Urinalysis Basic - ( 25 Aug 2022 19:53 )    Color: Light Yellow / Appearance: Slightly Turbid / S.013 / pH: x  Gluc: x / Ketone: Trace  / Bili: Negative / Urobili: Negative   Blood: x / Protein: Trace / Nitrite: Negative   Leuk Esterase: Moderate / RBC: 3-5 /hpf / WBC 7 /HPF   Sq Epi: x / Non Sq Epi: 4 /hpf / Bacteria: Occasional          ====================  Assessment & Plan: 32 year old  @ 20+2 weeks (NERISSA: 23) presents to the ED after syncope and admitted to OBGYN service, found to have LBBB with chest pain and brought to cath lab now transferred to the CCU      ====================    #Neuro  - no active issues    #Resp  - no active issues    #CV  LBBB w/ chest pain  New LBBB in the setting of chest pain concerning for STEMI  - Troponins negative  - Follow up cath results    #Renal/  - No active issues    Pregnancy  - OBGYN following    #GI  - Regular diet    #Endocrine  - F/u TSH, lipid, a1c    #Heme/onc  Normocytic anemia  - Cont to monitor  - iron studies    #ID  - no active issues CCU Accept Note    JHOANACHANILORETTA TAMMIE  32y  Patient is a 32y old  Female who presents with a chief complaint of syncope (26 Aug 2022 10:52)      ====================  HPI & Hospital Course: 32 year old  @ 20+1 weeks (NERISSA: 23) presents to the ED s/p a fall at 3pm. Patient states that at this time she was standing on the top of the stairs when she suddenly began to feel lower abdominal pain and dizzy. Because she thought she was about to synopsize, the patient sat down. Per the patient, the next thing she remembers is waking up at the bottom of the stairs sitting on her bottom. Patient thinks that she slid down the stairs on her bottom; however, she is unsure, as she was not conscious when she went down the stairs. Patient states that since the fall she has been feeling constant, lower back pain that she rates as an 8/10. She received Tylenol in the ED for the pain; however, states that it only marginally helped. Patient states that the back pain radiates to her lower abdomen. States that the pain in her lower abdomen comes and goes and she rates this pain as a 5/10. Of note, the syncopal episode was unwitnessed; however, she denies any urinary or bowel incontinence. Denies any tongue biting. Also states that when she regained consciousness she did not feel any increase in tiredness. Of note patient states that she had these presyncopal feelings multiple times in the past year that have intensified during pregnancy. Of note patient denies ever losing consciousness in the past. Patient states that these feelings are normally precipitated by warm temperatures or standing for prolonged periods of time. Patient denies any family history of anyone suffering from episodes akin to this. At time of OBGYN evaluation in the ED, patient still endorsed lightheadedness and dizziness and stated that she did not think she would be able to get out of bed without a syncopal event. Patient endorsed intermittent palpitations. Denies nausea, vomiting, chest pain at time of OBGYN initial evaluation.    Patient was complaining of chest pain and found to have a new left bundle branch block. Was emergent transferred to the cath lab and transferred to CCU for further monitoring.        Past Medical History:     Past Surgical History:     Home Medications:  Prenatal Multivitamins with Folic Acid 1 mg oral tablet: 1 tab(s) orally once a day (26 Aug 2022 11:48)      Current Medications:   MEDICATIONS  (STANDING):  famotidine Injectable 20 milliGRAM(s) IV Push once  sodium chloride 0.9%. 250 milliLiter(s) (50 mL/Hr) IV Continuous <Continuous>    MEDICATIONS  (PRN):      Allergies:     Family History:     Social History:     ====================  Vital Signs Last 24 Hrs  T(C): 37.1 (26 Aug 2022 11:50), Max: 37.6 (25 Aug 2022 20:38)  T(F): 98.7 (26 Aug 2022 11:50), Max: 99.7 (25 Aug 2022 21:00)  HR: 103 (26 Aug 2022 11:50) (73 - 118)  BP: 109/79 (26 Aug 2022 11:50) (109/79 - 119/76)  BP(mean): --  RR: 16 (26 Aug 2022 11:50) (16 - 20)  SpO2: 100% (26 Aug 2022 11:50) (81% - 100%)    Parameters below as of 26 Aug 2022 11:50  Patient On (Oxygen Delivery Method): room air        Adult Advanced Hemodynamics Last 24 Hrs  CVP(mm Hg): --  CVP(cm H2O): --  CO: --  CI: --  PA: --  PA(mean): --  PCWP: --  SVR: --  SVRI: --  PVR: --  PVRI: --    Physical Exam:   General: NAD  HEENT: PERRL, EOMI, normal sclera and conjunctiva, no oral lesions  Neck: Supple, no JVD  Lungs: CTA bilaterally  Heart: RRR, normal S1S2, no murmurs/rubs/gallops  Abdomen: Soft, ND/NT  Extremities: 2+ peripheral pulses, no cyanosis/clubbing/edema, full ROM  Skin: Warm, well-perfused  Neuro: A&O x3, no focal deficits      ====================  Labs & Imaging:   CBC Full  -  ( 25 Aug 2022 18:02 )  WBC Count : 9.53 K/uL  RBC Count : 3.70 M/uL  Hemoglobin : 11.4 g/dL  Hematocrit : 33.4 %  Platelet Count - Automated : 364 K/uL  Mean Cell Volume : 90.3 fl  Mean Cell Hemoglobin : 30.8 pg  Mean Cell Hemoglobin Concentration : 34.1 gm/dL  Auto Neutrophil # : 7.77 K/uL  Auto Lymphocyte # : 0.95 K/uL  Auto Monocyte # : 0.72 K/uL  Auto Eosinophil # : 0.03 K/uL  Auto Basophil # : 0.02 K/uL  Auto Neutrophil % : 81.5 %  Auto Lymphocyte % : 10.0 %  Auto Monocyte % : 7.6 %  Auto Eosinophil % : 0.3 %  Auto Basophil % : 0.2 %        137  |  104  |  10  ----------------------------<  89  3.8   |  23  |  0.56    Ca    8.5      25 Aug 2022 18:04    TPro  6.1  /  Alb  3.6  /  TBili  0.8  /  DBili  x   /  AST  21  /  ALT  16  /  AlkPhos  36<L>      PT/INR - ( 25 Aug 2022 18:03 )   PT: 10.7 sec;   INR: 0.92 ratio         PTT - ( 25 Aug 2022 18:03 )  PTT:25.8 sec          Urinalysis Basic - ( 25 Aug 2022 19:53 )    Color: Light Yellow / Appearance: Slightly Turbid / S.013 / pH: x  Gluc: x / Ketone: Trace  / Bili: Negative / Urobili: Negative   Blood: x / Protein: Trace / Nitrite: Negative   Leuk Esterase: Moderate / RBC: 3-5 /hpf / WBC 7 /HPF   Sq Epi: x / Non Sq Epi: 4 /hpf / Bacteria: Occasional          ====================  Assessment & Plan: 32 year old  @ 20+2 weeks (NERISSA: 23) presents to the ED after syncope and admitted to OBGYN service, found to have LBBB with chest pain and brought to cath lab now transferred to the CCU      ====================    #Neuro  Syncope; convulsive syncope possible vasovagal  - cont to monitor    #Resp  - no active issues    #CV  LBBB w/ chest pain  New LBBB in the setting of chest pain but negative troponins less likely to be STEMI. May be peripartum cardiomyopathy causing conduction disease  - Troponins negative  - Follow up cath results    #Renal/  - No active issues    Pregnancy  - OBGYN following    #GI  - Regular diet    #Endocrine  - F/u TSH, lipid, a1c    #Heme/onc  Normocytic anemia  - Cont to monitor  - iron studies    #ID  - no active issues CCU Accept Note    JHOANACHANILORETTA TAMMIE  32y  Patient is a 32y old  Female who presents with a chief complaint of syncope (26 Aug 2022 10:52)      ====================  HPI & Hospital Course: 32 year old  @ 20+1 weeks (NERISSA: 23) presents to the ED s/p a fall at 3pm. Patient states that at this time she was standing on the top of the stairs when she suddenly began to feel lower abdominal pain and dizzy. Because she thought she was about to synopsize, the patient sat down. Per the patient, the next thing she remembers is waking up at the bottom of the stairs sitting on her bottom. Patient thinks that she slid down the stairs on her bottom; however, she is unsure, as she was not conscious when she went down the stairs. Patient states that since the fall she has been feeling constant, lower back pain that she rates as an 8/10. She received Tylenol in the ED for the pain; however, states that it only marginally helped. Patient states that the back pain radiates to her lower abdomen. States that the pain in her lower abdomen comes and goes and she rates this pain as a 5/10. Of note, the syncopal episode was unwitnessed; however, she denies any urinary or bowel incontinence. Denies any tongue biting. Also states that when she regained consciousness she did not feel any increase in tiredness. Of note patient states that she had these presyncopal feelings multiple times in the past year that have intensified during pregnancy. Of note patient denies ever losing consciousness in the past. Patient states that these feelings are normally precipitated by warm temperatures or standing for prolonged periods of time. Patient denies any family history of anyone suffering from episodes akin to this. At time of OBGYN evaluation in the ED, patient still endorsed lightheadedness and dizziness and stated that she did not think she would be able to get out of bed without a syncopal event. Patient endorsed intermittent palpitations. Denies nausea, vomiting, chest pain at time of OBGYN initial evaluation.    Patient was complaining of chest pain and found to have a new left bundle branch block. Was emergent transferred to the cath lab and transferred to CCU for further monitoring.        Past Medical History:     Past Surgical History:     Home Medications:  Prenatal Multivitamins with Folic Acid 1 mg oral tablet: 1 tab(s) orally once a day (26 Aug 2022 11:48)      Current Medications:   MEDICATIONS  (STANDING):  famotidine Injectable 20 milliGRAM(s) IV Push once  sodium chloride 0.9%. 250 milliLiter(s) (50 mL/Hr) IV Continuous <Continuous>    MEDICATIONS  (PRN):      Allergies:     Family History:     Social History:     ====================  Vital Signs Last 24 Hrs  T(C): 37.1 (26 Aug 2022 11:50), Max: 37.6 (25 Aug 2022 20:38)  T(F): 98.7 (26 Aug 2022 11:50), Max: 99.7 (25 Aug 2022 21:00)  HR: 103 (26 Aug 2022 11:50) (73 - 118)  BP: 109/79 (26 Aug 2022 11:50) (109/79 - 119/76)  BP(mean): --  RR: 16 (26 Aug 2022 11:50) (16 - 20)  SpO2: 100% (26 Aug 2022 11:50) (81% - 100%)    Parameters below as of 26 Aug 2022 11:50  Patient On (Oxygen Delivery Method): room air        Adult Advanced Hemodynamics Last 24 Hrs  CVP(mm Hg): --  CVP(cm H2O): --  CO: --  CI: --  PA: --  PA(mean): --  PCWP: --  SVR: --  SVRI: --  PVR: --  PVRI: --    Physical Exam:   General: NAD  HEENT: PERRL, EOMI, normal sclera and conjunctiva, no oral lesions  Neck: Supple, no JVD  Lungs: CTA bilaterally  Heart: RRR, normal S1S2, no murmurs/rubs/gallops  Abdomen: Soft, ND/NT  Extremities: 2+ peripheral pulses, no cyanosis/clubbing/edema, full ROM  Skin: Warm, well-perfused  Neuro: A&O x3, no focal deficits      ====================  Labs & Imaging:   CBC Full  -  ( 25 Aug 2022 18:02 )  WBC Count : 9.53 K/uL  RBC Count : 3.70 M/uL  Hemoglobin : 11.4 g/dL  Hematocrit : 33.4 %  Platelet Count - Automated : 364 K/uL  Mean Cell Volume : 90.3 fl  Mean Cell Hemoglobin : 30.8 pg  Mean Cell Hemoglobin Concentration : 34.1 gm/dL  Auto Neutrophil # : 7.77 K/uL  Auto Lymphocyte # : 0.95 K/uL  Auto Monocyte # : 0.72 K/uL  Auto Eosinophil # : 0.03 K/uL  Auto Basophil # : 0.02 K/uL  Auto Neutrophil % : 81.5 %  Auto Lymphocyte % : 10.0 %  Auto Monocyte % : 7.6 %  Auto Eosinophil % : 0.3 %  Auto Basophil % : 0.2 %        137  |  104  |  10  ----------------------------<  89  3.8   |  23  |  0.56    Ca    8.5      25 Aug 2022 18:04    TPro  6.1  /  Alb  3.6  /  TBili  0.8  /  DBili  x   /  AST  21  /  ALT  16  /  AlkPhos  36<L>      PT/INR - ( 25 Aug 2022 18:03 )   PT: 10.7 sec;   INR: 0.92 ratio         PTT - ( 25 Aug 2022 18:03 )  PTT:25.8 sec          Urinalysis Basic - ( 25 Aug 2022 19:53 )    Color: Light Yellow / Appearance: Slightly Turbid / S.013 / pH: x  Gluc: x / Ketone: Trace  / Bili: Negative / Urobili: Negative   Blood: x / Protein: Trace / Nitrite: Negative   Leuk Esterase: Moderate / RBC: 3-5 /hpf / WBC 7 /HPF   Sq Epi: x / Non Sq Epi: 4 /hpf / Bacteria: Occasional          ====================  Assessment & Plan: 32 year old  @ 20+2 weeks (NERISSA: 23) presents to the ED after syncope and admitted to OBGYN service, found to have LBBB with chest pain and brought to cath lab now transferred to the CCU      ====================    #Neuro  Syncope; convulsive syncope possible vasovagal  - cont to monitor    #Resp  - no active issues    #CV  LBBB w/ chest pain  New LBBB in the setting of chest pain but negative troponins less likely to be STEMI. May be peripartum cardiomyopathy causing conduction disease  - Troponins negative  - Cath unremarkable with no evidence of SCAD    #Renal/  Asymptomatic bacteriuria in the setting of pregnancy  - Ceftriaxone    Pregnancy  - OBGYN following    #GI  - Regular diet    #Endocrine  - F/u TSH, lipid, a1c    #Heme/onc  Normocytic anemia  - Cont to monitor  - iron studies    #ID  - no active issues CCU Accept Note    JHOANACHANILORETTA TAMMIE  32y  Patient is a 32y old  Female who presents with a chief complaint of syncope (26 Aug 2022 10:52)      ====================  HPI & Hospital Course: 32 year old  @ 20+1 weeks (NERISSA: 23) presents to the ED s/p a fall at 3pm. Patient states that at this time she was standing on the top of the stairs when she suddenly began to feel lower abdominal pain and dizzy. Because she thought she was about to synopsize, the patient sat down. Per the patient, the next thing she remembers is waking up at the bottom of the stairs sitting on her bottom. Patient thinks that she slid down the stairs on her bottom; however, she is unsure, as she was not conscious when she went down the stairs. Patient states that since the fall she has been feeling constant, lower back pain that she rates as an 8/10. She received Tylenol in the ED for the pain; however, states that it only marginally helped. Patient states that the back pain radiates to her lower abdomen. States that the pain in her lower abdomen comes and goes and she rates this pain as a 5/10. Of note, the syncopal episode was unwitnessed; however, she denies any urinary or bowel incontinence. Denies any tongue biting. Also states that when she regained consciousness she did not feel any increase in tiredness. Of note patient states that she had these presyncopal feelings multiple times in the past year that have intensified during pregnancy. Of note patient denies ever losing consciousness in the past. Patient states that these feelings are normally precipitated by warm temperatures or standing for prolonged periods of time. Patient denies any family history of anyone suffering from episodes akin to this. At time of OBGYN evaluation in the ED, patient still endorsed lightheadedness and dizziness and stated that she did not think she would be able to get out of bed without a syncopal event. Patient endorsed intermittent palpitations. Denies nausea, vomiting, chest pain at time of OBGYN initial evaluation.    Patient was complaining of chest pain and found to have a new left bundle branch block. Was emergent transferred to the cath lab and transferred to CCU for further monitoring.        Past Medical History:     Past Surgical History:     Home Medications:  Prenatal Multivitamins with Folic Acid 1 mg oral tablet: 1 tab(s) orally once a day (26 Aug 2022 11:48)      Current Medications:   MEDICATIONS  (STANDING):  famotidine Injectable 20 milliGRAM(s) IV Push once  sodium chloride 0.9%. 250 milliLiter(s) (50 mL/Hr) IV Continuous <Continuous>    MEDICATIONS  (PRN):      Allergies:     Family History:     Social History:     ====================  Vital Signs Last 24 Hrs  T(C): 37.1 (26 Aug 2022 11:50), Max: 37.6 (25 Aug 2022 20:38)  T(F): 98.7 (26 Aug 2022 11:50), Max: 99.7 (25 Aug 2022 21:00)  HR: 103 (26 Aug 2022 11:50) (73 - 118)  BP: 109/79 (26 Aug 2022 11:50) (109/79 - 119/76)  BP(mean): --  RR: 16 (26 Aug 2022 11:50) (16 - 20)  SpO2: 100% (26 Aug 2022 11:50) (81% - 100%)    Parameters below as of 26 Aug 2022 11:50  Patient On (Oxygen Delivery Method): room air        Adult Advanced Hemodynamics Last 24 Hrs  CVP(mm Hg): --  CVP(cm H2O): --  CO: --  CI: --  PA: --  PA(mean): --  PCWP: --  SVR: --  SVRI: --  PVR: --  PVRI: --    Physical Exam:   General: NAD  HEENT: PERRL, EOMI, normal sclera and conjunctiva, no oral lesions  Neck: Supple, no JVD  Lungs: CTA bilaterally  Heart: RRR, normal S1S2, no murmurs/rubs/gallops  Abdomen: Soft, ND/NT  Extremities: 2+ peripheral pulses, no cyanosis/clubbing/edema, full ROM  Skin: Warm, well-perfused  Neuro: A&O x3, no focal deficits      ====================  Labs & Imaging:   CBC Full  -  ( 25 Aug 2022 18:02 )  WBC Count : 9.53 K/uL  RBC Count : 3.70 M/uL  Hemoglobin : 11.4 g/dL  Hematocrit : 33.4 %  Platelet Count - Automated : 364 K/uL  Mean Cell Volume : 90.3 fl  Mean Cell Hemoglobin : 30.8 pg  Mean Cell Hemoglobin Concentration : 34.1 gm/dL  Auto Neutrophil # : 7.77 K/uL  Auto Lymphocyte # : 0.95 K/uL  Auto Monocyte # : 0.72 K/uL  Auto Eosinophil # : 0.03 K/uL  Auto Basophil # : 0.02 K/uL  Auto Neutrophil % : 81.5 %  Auto Lymphocyte % : 10.0 %  Auto Monocyte % : 7.6 %  Auto Eosinophil % : 0.3 %  Auto Basophil % : 0.2 %        137  |  104  |  10  ----------------------------<  89  3.8   |  23  |  0.56    Ca    8.5      25 Aug 2022 18:04    TPro  6.1  /  Alb  3.6  /  TBili  0.8  /  DBili  x   /  AST  21  /  ALT  16  /  AlkPhos  36<L>      PT/INR - ( 25 Aug 2022 18:03 )   PT: 10.7 sec;   INR: 0.92 ratio         PTT - ( 25 Aug 2022 18:03 )  PTT:25.8 sec          Urinalysis Basic - ( 25 Aug 2022 19:53 )    Color: Light Yellow / Appearance: Slightly Turbid / S.013 / pH: x  Gluc: x / Ketone: Trace  / Bili: Negative / Urobili: Negative   Blood: x / Protein: Trace / Nitrite: Negative   Leuk Esterase: Moderate / RBC: 3-5 /hpf / WBC 7 /HPF   Sq Epi: x / Non Sq Epi: 4 /hpf / Bacteria: Occasional          ====================  Assessment & Plan: 32 year old  @ 20+2 weeks (NERISSA: 23) presents to the ED after syncope and admitted to OBGYN service, found to have LBBB with chest pain and brought to cath lab now transferred to the CCU      ====================    #Neuro  Syncope; convulsive syncope possible vasovagal  - cont to monitor    #Resp  - no active issues    #CV  LBBB w/ chest pain  New LBBB in the setting of chest pain but negative troponins less likely to be STEMI. May be peripartum cardiomyopathy causing conduction disease  - Troponins negative  - Cath unremarkable with no evidence of SCAD    #Renal/  Asymptomatic bacteriuria in the setting of pregnancy  - Ceftriaxone    Pregnancy  - OBGYN following    #GI  - Regular diet    #Endocrine  - F/u TSH, lipid, a1c    #Heme/onc  Normocytic anemia in the setting of pregnancy  - Cont to monitor  - iron studies    #ID  - no active issues CCU Accept Note    JHOANACHANILORETTA TAMMIE  32y  Patient is a 32y old  Female who presents with a chief complaint of syncope (26 Aug 2022 10:52)      ====================  HPI & Hospital Course: 32 year old  @ 20+1 weeks (NERISSA: 23) presents to the ED s/p a fall at 3pm. Patient states that at this time she was standing on the top of the stairs when she suddenly began to feel lower abdominal pain and dizzy. Because she thought she was about to synopsize, the patient sat down. Per the patient, the next thing she remembers is waking up at the bottom of the stairs sitting on her bottom. Patient thinks that she slid down the stairs on her bottom; however, she is unsure, as she was not conscious when she went down the stairs. Patient states that since the fall she has been feeling constant, lower back pain that she rates as an 8/10. She received Tylenol in the ED for the pain; however, states that it only marginally helped. Patient states that the back pain radiates to her lower abdomen. States that the pain in her lower abdomen comes and goes and she rates this pain as a 5/10. Of note, the syncopal episode was unwitnessed; however, she denies any urinary or bowel incontinence. Denies any tongue biting. Also states that when she regained consciousness she did not feel any increase in tiredness. Of note patient states that she had these presyncopal feelings multiple times in the past year that have intensified during pregnancy. Of note patient denies ever losing consciousness in the past. Patient states that these feelings are normally precipitated by warm temperatures or standing for prolonged periods of time. Patient denies any family history of anyone suffering from episodes akin to this. At time of OBGYN evaluation in the ED, patient still endorsed lightheadedness and dizziness and stated that she did not think she would be able to get out of bed without a syncopal event. Patient endorsed intermittent palpitations. Denies nausea, vomiting, chest pain at time of OBGYN initial evaluation.    Patient was complaining of chest pain and found to have a new left bundle branch block. Was emergent transferred to the cath lab and transferred to CCU for further monitoring.        Past Medical History:     Past Surgical History:     Home Medications:  Prenatal Multivitamins with Folic Acid 1 mg oral tablet: 1 tab(s) orally once a day (26 Aug 2022 11:48)      Current Medications:   MEDICATIONS  (STANDING):  famotidine Injectable 20 milliGRAM(s) IV Push once  sodium chloride 0.9%. 250 milliLiter(s) (50 mL/Hr) IV Continuous <Continuous>    MEDICATIONS  (PRN):      Allergies:     Family History:     Social History:     ====================  Vital Signs Last 24 Hrs  T(C): 37.1 (26 Aug 2022 11:50), Max: 37.6 (25 Aug 2022 20:38)  T(F): 98.7 (26 Aug 2022 11:50), Max: 99.7 (25 Aug 2022 21:00)  HR: 103 (26 Aug 2022 11:50) (73 - 118)  BP: 109/79 (26 Aug 2022 11:50) (109/79 - 119/76)  BP(mean): --  RR: 16 (26 Aug 2022 11:50) (16 - 20)  SpO2: 100% (26 Aug 2022 11:50) (81% - 100%)    Parameters below as of 26 Aug 2022 11:50  Patient On (Oxygen Delivery Method): room air        Adult Advanced Hemodynamics Last 24 Hrs  CVP(mm Hg): --  CVP(cm H2O): --  CO: --  CI: --  PA: --  PA(mean): --  PCWP: --  SVR: --  SVRI: --  PVR: --  PVRI: --    Physical Exam:   General: NAD  HEENT: PERRL, EOMI, normal sclera and conjunctiva, no oral lesions  Neck: Supple, no JVD  Lungs: CTA bilaterally  Heart: RRR, normal S1S2, no murmurs/rubs/gallops  Abdomen: Soft, ND/NT  Extremities: 2+ peripheral pulses, no cyanosis/clubbing/edema, full ROM  Skin: Warm, well-perfused  Neuro: A&O x3, no focal deficits      ====================  Labs & Imaging:   CBC Full  -  ( 25 Aug 2022 18:02 )  WBC Count : 9.53 K/uL  RBC Count : 3.70 M/uL  Hemoglobin : 11.4 g/dL  Hematocrit : 33.4 %  Platelet Count - Automated : 364 K/uL  Mean Cell Volume : 90.3 fl  Mean Cell Hemoglobin : 30.8 pg  Mean Cell Hemoglobin Concentration : 34.1 gm/dL  Auto Neutrophil # : 7.77 K/uL  Auto Lymphocyte # : 0.95 K/uL  Auto Monocyte # : 0.72 K/uL  Auto Eosinophil # : 0.03 K/uL  Auto Basophil # : 0.02 K/uL  Auto Neutrophil % : 81.5 %  Auto Lymphocyte % : 10.0 %  Auto Monocyte % : 7.6 %  Auto Eosinophil % : 0.3 %  Auto Basophil % : 0.2 %        137  |  104  |  10  ----------------------------<  89  3.8   |  23  |  0.56    Ca    8.5      25 Aug 2022 18:04    TPro  6.1  /  Alb  3.6  /  TBili  0.8  /  DBili  x   /  AST  21  /  ALT  16  /  AlkPhos  36<L>      PT/INR - ( 25 Aug 2022 18:03 )   PT: 10.7 sec;   INR: 0.92 ratio         PTT - ( 25 Aug 2022 18:03 )  PTT:25.8 sec          Urinalysis Basic - ( 25 Aug 2022 19:53 )    Color: Light Yellow / Appearance: Slightly Turbid / S.013 / pH: x  Gluc: x / Ketone: Trace  / Bili: Negative / Urobili: Negative   Blood: x / Protein: Trace / Nitrite: Negative   Leuk Esterase: Moderate / RBC: 3-5 /hpf / WBC 7 /HPF   Sq Epi: x / Non Sq Epi: 4 /hpf / Bacteria: Occasional          ====================  Assessment & Plan: 32 year old  @ 20+2 weeks (NERISSA: 23) presents to the ED after syncope and admitted to OBGYN service, found to have LBBB with chest pain and brought to cath lab now transferred to the CCU      ====================    #Neuro  Syncope; convulsive syncope possible vasovagal  - cont to monitor    #Resp  - no active issues    #CV  LBBB w/ chest pain  New LBBB in the setting of chest pain but negative troponins less likely to be STEMI. 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

## 2023-11-20 PROBLEM — Z86.79 PERSONAL HISTORY OF OTHER DISEASES OF THE CIRCULATORY SYSTEM: Chronic | Status: ACTIVE | Noted: 2023-01-05

## 2023-11-20 PROBLEM — I44.7 LEFT BUNDLE-BRANCH BLOCK, UNSPECIFIED: Chronic | Status: ACTIVE | Noted: 2023-01-05

## 2023-11-20 PROBLEM — I50.9 HEART FAILURE, UNSPECIFIED: Chronic | Status: ACTIVE | Noted: 2023-01-05

## 2023-11-20 PROBLEM — A49.1 STREPTOCOCCAL INFECTION, UNSPECIFIED SITE: Chronic | Status: ACTIVE | Noted: 2023-01-05

## 2023-11-27 ENCOUNTER — APPOINTMENT (OUTPATIENT)
Dept: FAMILY MEDICINE | Facility: CLINIC | Age: 33
End: 2023-11-27
Payer: COMMERCIAL

## 2023-11-27 VITALS
TEMPERATURE: 98.1 F | RESPIRATION RATE: 16 BRPM | WEIGHT: 110 LBS | HEIGHT: 63 IN | SYSTOLIC BLOOD PRESSURE: 127 MMHG | HEART RATE: 81 BPM | BODY MASS INDEX: 19.49 KG/M2 | OXYGEN SATURATION: 98 % | DIASTOLIC BLOOD PRESSURE: 80 MMHG

## 2023-11-27 DIAGNOSIS — Z82.49 FAMILY HISTORY OF ISCHEMIC HEART DISEASE AND OTHER DISEASES OF THE CIRCULATORY SYSTEM: ICD-10-CM

## 2023-11-27 DIAGNOSIS — R76.12 NONSPECIFIC REACTION TO CELL MEDIATED IMMUNITY MEASUREMENT OF GAMMA INTERFERON ANTIGEN RESPONSE W/OUT ACTIVE TUBERCULOSIS: ICD-10-CM

## 2023-11-27 DIAGNOSIS — Z23 ENCOUNTER FOR IMMUNIZATION: ICD-10-CM

## 2023-11-27 DIAGNOSIS — Z00.00 ENCOUNTER FOR GENERAL ADULT MEDICAL EXAMINATION W/OUT ABNORMAL FINDINGS: ICD-10-CM

## 2023-11-27 DIAGNOSIS — Z87.898 PERSONAL HISTORY OF OTHER SPECIFIED CONDITIONS: ICD-10-CM

## 2023-11-27 DIAGNOSIS — F39 UNSPECIFIED MOOD [AFFECTIVE] DISORDER: ICD-10-CM

## 2023-11-27 PROCEDURE — 99385 PREV VISIT NEW AGE 18-39: CPT | Mod: 25

## 2023-11-27 PROCEDURE — G0008: CPT

## 2023-11-27 PROCEDURE — 90686 IIV4 VACC NO PRSV 0.5 ML IM: CPT

## 2023-11-27 RX ORDER — METOPROLOL SUCCINATE 25 MG/1
25 TABLET, EXTENDED RELEASE ORAL DAILY
Qty: 45 | Refills: 1 | Status: DISCONTINUED | COMMUNITY
Start: 2022-09-20 | End: 2023-11-27

## 2023-11-30 LAB
ALBUMIN SERPL ELPH-MCNC: 4.6 G/DL
ALP BLD-CCNC: 54 U/L
ALT SERPL-CCNC: 10 U/L
ANION GAP SERPL CALC-SCNC: 12 MMOL/L
AST SERPL-CCNC: 13 U/L
BASOPHILS # BLD AUTO: 0.03 K/UL
BASOPHILS NFR BLD AUTO: 0.6 %
BILIRUB SERPL-MCNC: 1.4 MG/DL
BUN SERPL-MCNC: 14 MG/DL
CALCIUM SERPL-MCNC: 10 MG/DL
CHLORIDE SERPL-SCNC: 104 MMOL/L
CHOLEST SERPL-MCNC: 273 MG/DL
CO2 SERPL-SCNC: 24 MMOL/L
CREAT SERPL-MCNC: 0.71 MG/DL
EGFR: 117 ML/MIN/1.73M2
EOSINOPHIL # BLD AUTO: 0.14 K/UL
EOSINOPHIL NFR BLD AUTO: 2.7 %
GLUCOSE SERPL-MCNC: 85 MG/DL
HCT VFR BLD CALC: 45.7 %
HDLC SERPL-MCNC: 65 MG/DL
HGB BLD-MCNC: 15 G/DL
IMM GRANULOCYTES NFR BLD AUTO: 0.2 %
LDLC SERPL CALC-MCNC: 196 MG/DL
LYMPHOCYTES # BLD AUTO: 1.7 K/UL
LYMPHOCYTES NFR BLD AUTO: 33.2 %
MAN DIFF?: NORMAL
MCHC RBC-ENTMCNC: 29.6 PG
MCHC RBC-ENTMCNC: 32.8 GM/DL
MCV RBC AUTO: 90.1 FL
MONOCYTES # BLD AUTO: 0.48 K/UL
MONOCYTES NFR BLD AUTO: 9.4 %
NEUTROPHILS # BLD AUTO: 2.76 K/UL
NEUTROPHILS NFR BLD AUTO: 53.9 %
NONHDLC SERPL-MCNC: 208 MG/DL
PLATELET # BLD AUTO: 351 K/UL
POTASSIUM SERPL-SCNC: 4.5 MMOL/L
PROT SERPL-MCNC: 7.1 G/DL
RBC # BLD: 5.07 M/UL
RBC # FLD: 12.8 %
SODIUM SERPL-SCNC: 139 MMOL/L
TRIGL SERPL-MCNC: 75 MG/DL
TSH SERPL-ACNC: 1.76 UIU/ML
WBC # FLD AUTO: 5.12 K/UL

## 2024-05-20 ENCOUNTER — APPOINTMENT (OUTPATIENT)
Dept: FAMILY MEDICINE | Facility: CLINIC | Age: 34
End: 2024-05-20
Payer: COMMERCIAL

## 2024-05-20 VITALS
BODY MASS INDEX: 19.74 KG/M2 | OXYGEN SATURATION: 99 % | HEIGHT: 63 IN | HEART RATE: 90 BPM | WEIGHT: 111.4 LBS | TEMPERATURE: 98.2 F | SYSTOLIC BLOOD PRESSURE: 130 MMHG | DIASTOLIC BLOOD PRESSURE: 78 MMHG

## 2024-05-20 DIAGNOSIS — Z91.09 OTHER ALLERGY STATUS, OTHER THAN TO DRUGS AND BIOLOGICAL SUBSTANCES: ICD-10-CM

## 2024-05-20 DIAGNOSIS — J06.9 ACUTE UPPER RESPIRATORY INFECTION, UNSPECIFIED: ICD-10-CM

## 2024-05-20 DIAGNOSIS — E78.00 PURE HYPERCHOLESTEROLEMIA, UNSPECIFIED: ICD-10-CM

## 2024-05-20 DIAGNOSIS — F41.9 ANXIETY DISORDER, UNSPECIFIED: ICD-10-CM

## 2024-05-20 DIAGNOSIS — I42.9 CARDIOMYOPATHY, UNSPECIFIED: ICD-10-CM

## 2024-05-20 PROCEDURE — 99214 OFFICE O/P EST MOD 30 MIN: CPT

## 2024-05-20 PROCEDURE — 36415 COLL VENOUS BLD VENIPUNCTURE: CPT

## 2024-05-20 RX ORDER — BENZONATATE 100 MG/1
100 CAPSULE ORAL 3 TIMES DAILY
Qty: 21 | Refills: 0 | Status: ACTIVE | COMMUNITY
Start: 2024-05-20 | End: 1900-01-01

## 2024-05-20 RX ORDER — PROMETHAZINE HYDROCHLORIDE AND DEXTROMETHORPHAN HYDROBROMIDE ORAL SOLUTION 15; 6.25 MG/5ML; MG/5ML
6.25-15 SOLUTION ORAL EVERY 6 HOURS
Qty: 1 | Refills: 0 | Status: ACTIVE | COMMUNITY
Start: 2024-05-20 | End: 1900-01-01

## 2024-05-20 RX ORDER — FLUTICASONE PROPIONATE 50 UG/1
50 SPRAY, METERED NASAL TWICE DAILY
Qty: 1 | Refills: 1 | Status: ACTIVE | COMMUNITY
Start: 2024-05-20 | End: 1900-01-01

## 2024-05-20 RX ORDER — DICYCLOMINE HYDROCHLORIDE 10 MG/1
10 CAPSULE ORAL
Refills: 0 | Status: ACTIVE | COMMUNITY

## 2024-05-20 RX ORDER — ESCITALOPRAM OXALATE 10 MG/1
10 TABLET ORAL DAILY
Qty: 30 | Refills: 1 | Status: DISCONTINUED | COMMUNITY
Start: 2023-11-27 | End: 2024-05-20

## 2024-05-20 NOTE — PHYSICAL EXAM
[Normal] : normal gait, coordination grossly intact, no focal deficits and deep tendon reflexes were 2+ and symmetric [de-identified] : +erythematous rash on chest

## 2024-05-20 NOTE — ASSESSMENT
[FreeTextEntry1] : URI - Likely viral vs. seasonal allergies - Start Flonase, Promethazine, humidifier at night  - Advised to f/u if no improvement in 5 days, develops fever, or improves then worsens  Anxiety - Trial Zoloft 25mg - If patient cannot tolerate, then switch to Buspar   HLD - Elevated cholesterol in Nov 2023 - Repeat Lipid panel today  Pregnancy-induced CM - Follow-up with [facility]  - Patient has been asymptomatic, but has not followed-up in >1 year - She self-discontinued Metoprolol   82

## 2024-05-20 NOTE — REVIEW OF SYSTEMS
[Sore Throat] : sore throat [Negative] : Heme/Lymph [Cough] : cough [Earache] : no earache [Nasal Discharge] : no nasal discharge [Postnasal Drip] : no postnasal drip [Shortness Of Breath] : no shortness of breath [Wheezing] : no wheezing [Dyspnea on Exertion] : no dyspnea on exertion

## 2024-05-20 NOTE — HISTORY OF PRESENT ILLNESS
[FreeTextEntry1] : URI [de-identified] : 35 y/o F with PMHx pregnancy-induced cardiomyopathy, anxiety, IBS, +TB (treated in 2018) presenting for follow-up. Patient reports URI symptoms of sore throat and productive cough x 5 days, with  assoc headache. Denies fever, chills, rash, N/V/D, dysuria. 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 Sxs unrelieved with Dayquil/Nyquil, Tylenol. Has not been tested for Covid-19.  Additionally, patient reports h/o anxiety. Tried Lexapro in the Nov 2023, but could not tolerate 2/2 severe nausea.  Lastly, has not followed-up with Cardiology regarding CM since 2022. EF 40% at that time. She self-discontinued Metoprolol, denies CP, SOB, palpitations.

## 2024-05-21 DIAGNOSIS — E78.5 HYPERLIPIDEMIA, UNSPECIFIED: ICD-10-CM

## 2024-05-21 DIAGNOSIS — E78.2 MIXED HYPERLIPIDEMIA: ICD-10-CM

## 2024-05-21 LAB
CHOLEST SERPL-MCNC: 282 MG/DL
HDLC SERPL-MCNC: 45 MG/DL
LDLC SERPL CALC-MCNC: 203 MG/DL
NONHDLC SERPL-MCNC: 236 MG/DL
TRIGL SERPL-MCNC: 176 MG/DL

## 2024-05-22 RX ORDER — MOMETASONE 50 UG/1
50 SPRAY, METERED NASAL
Qty: 2 | Refills: 0 | Status: ACTIVE | COMMUNITY
Start: 2024-05-22 | End: 1900-01-01

## 2024-06-12 ENCOUNTER — RX CHANGE (OUTPATIENT)
Age: 34
End: 2024-06-12

## 2024-06-12 RX ORDER — SERTRALINE 25 MG/1
25 TABLET, FILM COATED ORAL
Qty: 90 | Refills: 1 | Status: ACTIVE | COMMUNITY
Start: 1900-01-01 | End: 1900-01-01

## 2024-06-12 RX ORDER — ATORVASTATIN CALCIUM 20 MG/1
20 TABLET, FILM COATED ORAL DAILY
Qty: 30 | Refills: 1 | Status: DISCONTINUED | COMMUNITY
Start: 2024-05-21 | End: 2024-06-12

## 2024-06-12 RX ORDER — ATORVASTATIN CALCIUM 20 MG/1
20 TABLET, FILM COATED ORAL
Qty: 90 | Refills: 1 | Status: ACTIVE | COMMUNITY
Start: 1900-01-01 | End: 1900-01-01

## 2024-06-12 RX ORDER — SERTRALINE 25 MG/1
25 TABLET, FILM COATED ORAL DAILY
Qty: 30 | Refills: 1 | Status: DISCONTINUED | COMMUNITY
Start: 2024-05-20 | End: 2024-06-12

## 2024-08-28 ENCOUNTER — NON-APPOINTMENT (OUTPATIENT)
Age: 34
End: 2024-08-28

## 2024-08-29 ENCOUNTER — NON-APPOINTMENT (OUTPATIENT)
Age: 34
End: 2024-08-29

## 2024-08-29 ENCOUNTER — APPOINTMENT (OUTPATIENT)
Dept: ELECTROPHYSIOLOGY | Facility: CLINIC | Age: 34
End: 2024-08-29

## 2024-08-29 ENCOUNTER — APPOINTMENT (OUTPATIENT)
Dept: CARDIOLOGY | Facility: CLINIC | Age: 34
End: 2024-08-29
Payer: COMMERCIAL

## 2024-08-29 VITALS
HEART RATE: 61 BPM | BODY MASS INDEX: 19.67 KG/M2 | DIASTOLIC BLOOD PRESSURE: 84 MMHG | OXYGEN SATURATION: 98 % | SYSTOLIC BLOOD PRESSURE: 96 MMHG | HEIGHT: 63 IN | WEIGHT: 111 LBS

## 2024-08-29 DIAGNOSIS — R42 DIZZINESS AND GIDDINESS: ICD-10-CM

## 2024-08-29 DIAGNOSIS — R55 SYNCOPE AND COLLAPSE: ICD-10-CM

## 2024-08-29 DIAGNOSIS — I95.1 ORTHOSTATIC HYPOTENSION: ICD-10-CM

## 2024-08-29 PROCEDURE — 99214 OFFICE O/P EST MOD 30 MIN: CPT | Mod: 25

## 2024-08-29 PROCEDURE — G2211 COMPLEX E/M VISIT ADD ON: CPT | Mod: NC

## 2024-08-29 PROCEDURE — 93000 ELECTROCARDIOGRAM COMPLETE: CPT

## 2024-08-29 NOTE — PHYSICAL EXAM

## 2024-08-29 NOTE — PHYSICAL EXAM

## 2024-08-29 NOTE — HISTORY OF PRESENT ILLNESS
[FreeTextEntry1] : Edilma is getting episodes of dizziness even when sitting lasting a few seconds. When gets up in AM takes a few minutes to get up. She had COVID and had fog. Symptoms started before and then worse after. Cannot stand for long gets symptoms especially if hot.

## 2024-08-29 NOTE — DISCUSSION/SUMMARY
[FreeTextEntry1] : The patient is a 34-year-old female CMP with episodes or dizziness and near syncope most likely POTS. #1 CV- ECHO EF=40%, normal Cath, normal MRI, proceed with repeat ECHO and event monitor #2 POTS-  c/w compression stockings, some salt, hydration and low threshold to restart metoprolol.  #3 Ob- Advised to not get pregnant at present. All questions answered. [EKG obtained to assist in diagnosis and management of assessed problem(s)] : EKG obtained to assist in diagnosis and management of assessed problem(s)

## 2024-08-29 NOTE — PHYSICAL EXAM

## 2024-09-07 PROCEDURE — 93244 EXT ECG>48HR<7D REV&INTERPJ: CPT

## 2024-09-14 ENCOUNTER — APPOINTMENT (OUTPATIENT)
Dept: CARDIOLOGY | Facility: CLINIC | Age: 34
End: 2024-09-14
